# Patient Record
Sex: FEMALE | Race: WHITE | NOT HISPANIC OR LATINO | Employment: FULL TIME | URBAN - METROPOLITAN AREA
[De-identification: names, ages, dates, MRNs, and addresses within clinical notes are randomized per-mention and may not be internally consistent; named-entity substitution may affect disease eponyms.]

---

## 2017-01-19 ENCOUNTER — LAB VISIT (OUTPATIENT)
Dept: LAB | Facility: HOSPITAL | Age: 67
End: 2017-01-19
Attending: FAMILY MEDICINE
Payer: MEDICARE

## 2017-01-19 ENCOUNTER — OFFICE VISIT (OUTPATIENT)
Dept: INTERNAL MEDICINE | Facility: CLINIC | Age: 67
End: 2017-01-19
Payer: MEDICARE

## 2017-01-19 VITALS
DIASTOLIC BLOOD PRESSURE: 60 MMHG | BODY MASS INDEX: 25.36 KG/M2 | HEART RATE: 76 BPM | SYSTOLIC BLOOD PRESSURE: 110 MMHG | HEIGHT: 62 IN | TEMPERATURE: 99 F | WEIGHT: 137.81 LBS

## 2017-01-19 DIAGNOSIS — Z00.00 ANNUAL PHYSICAL EXAM: Primary | ICD-10-CM

## 2017-01-19 DIAGNOSIS — E55.9 VITAMIN D DEFICIENCY: ICD-10-CM

## 2017-01-19 DIAGNOSIS — R73.09 ABNORMAL GLUCOSE: ICD-10-CM

## 2017-01-19 DIAGNOSIS — M81.0 OSTEOPOROSIS: ICD-10-CM

## 2017-01-19 DIAGNOSIS — D84.9 IMMUNOSUPPRESSED STATUS: ICD-10-CM

## 2017-01-19 DIAGNOSIS — M05.9 RHEUMATOID ARTHRITIS WITH POSITIVE RHEUMATOID FACTOR, INVOLVING UNSPECIFIED SITE: ICD-10-CM

## 2017-01-19 DIAGNOSIS — E78.5 HYPERLIPIDEMIA, UNSPECIFIED HYPERLIPIDEMIA TYPE: ICD-10-CM

## 2017-01-19 LAB
25(OH)D3+25(OH)D2 SERPL-MCNC: 38 NG/ML
ALBUMIN SERPL BCP-MCNC: 3.6 G/DL
ALP SERPL-CCNC: 127 U/L
ALT SERPL W/O P-5'-P-CCNC: 21 U/L
ANION GAP SERPL CALC-SCNC: 6 MMOL/L
AST SERPL-CCNC: 19 U/L
BASOPHILS # BLD AUTO: 0.02 K/UL
BASOPHILS NFR BLD: 0.3 %
BILIRUB SERPL-MCNC: 0.3 MG/DL
BUN SERPL-MCNC: 24 MG/DL
CALCIUM SERPL-MCNC: 9.4 MG/DL
CHLORIDE SERPL-SCNC: 107 MMOL/L
CHOLEST/HDLC SERPL: 4.2 {RATIO}
CO2 SERPL-SCNC: 26 MMOL/L
CREAT SERPL-MCNC: 0.8 MG/DL
DIFFERENTIAL METHOD: NORMAL
EOSINOPHIL # BLD AUTO: 0.1 K/UL
EOSINOPHIL NFR BLD: 1.7 %
ERYTHROCYTE [DISTWIDTH] IN BLOOD BY AUTOMATED COUNT: 14.3 %
EST. GFR  (AFRICAN AMERICAN): >60 ML/MIN/1.73 M^2
EST. GFR  (NON AFRICAN AMERICAN): >60 ML/MIN/1.73 M^2
GLUCOSE SERPL-MCNC: 98 MG/DL
HCT VFR BLD AUTO: 37.7 %
HDL/CHOLESTEROL RATIO: 23.7 %
HDLC SERPL-MCNC: 211 MG/DL
HDLC SERPL-MCNC: 50 MG/DL
HGB BLD-MCNC: 12.2 G/DL
LDLC SERPL CALC-MCNC: 142.8 MG/DL
LYMPHOCYTES # BLD AUTO: 2 K/UL
LYMPHOCYTES NFR BLD: 28.7 %
MCH RBC QN AUTO: 29.5 PG
MCHC RBC AUTO-ENTMCNC: 32.4 %
MCV RBC AUTO: 91 FL
MONOCYTES # BLD AUTO: 0.6 K/UL
MONOCYTES NFR BLD: 8.6 %
NEUTROPHILS # BLD AUTO: 4.2 K/UL
NEUTROPHILS NFR BLD: 60.6 %
NONHDLC SERPL-MCNC: 161 MG/DL
PLATELET # BLD AUTO: 300 K/UL
PMV BLD AUTO: 10.5 FL
POTASSIUM SERPL-SCNC: 4.5 MMOL/L
PROT SERPL-MCNC: 7.2 G/DL
RBC # BLD AUTO: 4.13 M/UL
SODIUM SERPL-SCNC: 139 MMOL/L
TRIGL SERPL-MCNC: 91 MG/DL
TSH SERPL DL<=0.005 MIU/L-ACNC: 0.91 UIU/ML
WBC # BLD AUTO: 6.89 K/UL

## 2017-01-19 PROCEDURE — 82306 VITAMIN D 25 HYDROXY: CPT

## 2017-01-19 PROCEDURE — 84443 ASSAY THYROID STIM HORMONE: CPT

## 2017-01-19 PROCEDURE — 80053 COMPREHEN METABOLIC PANEL: CPT

## 2017-01-19 PROCEDURE — 80061 LIPID PANEL: CPT

## 2017-01-19 PROCEDURE — 99999 PR PBB SHADOW E&M-EST. PATIENT-LVL III: CPT | Mod: PBBFAC,,, | Performed by: FAMILY MEDICINE

## 2017-01-19 PROCEDURE — 99499 UNLISTED E&M SERVICE: CPT | Mod: S$GLB,,, | Performed by: FAMILY MEDICINE

## 2017-01-19 PROCEDURE — 99397 PER PM REEVAL EST PAT 65+ YR: CPT | Mod: S$GLB,,, | Performed by: FAMILY MEDICINE

## 2017-01-19 PROCEDURE — 83036 HEMOGLOBIN GLYCOSYLATED A1C: CPT

## 2017-01-19 PROCEDURE — 85025 COMPLETE CBC W/AUTO DIFF WBC: CPT

## 2017-01-19 PROCEDURE — 36415 COLL VENOUS BLD VENIPUNCTURE: CPT | Mod: PO

## 2017-01-19 RX ORDER — TOBRAMYCIN 3 MG/ML
SOLUTION/ DROPS OPHTHALMIC
Refills: 0 | COMMUNITY
Start: 2016-12-16 | End: 2018-02-28

## 2017-01-19 NOTE — MR AVS SNAPSHOT
[facility]  [address] [place] [address]  Phone: [phone]  Fax: [phone]                  Emmy Padilla   2017 10:00 AM   Office Visit    Description:  Female : 1950   Provider:  Daniel Gutierrez MD   [facility]           Reason for Visit     Follow-up           Diagnoses this Visit        Comments    Annual physical exam    -  Primary     Rheumatoid arthritis with positive rheumatoid factor, involving unspecified site         Hyperlipidemia, unspecified hyperlipidemia type         Immunosuppressed status         Osteoporosis         Vitamin D deficiency         Abnormal glucose                To Do List           Future Appointments        Provider Department Dept Phone    2017 10:30 AM LABORATORY, [place] [facility] [phone]      Goals (5 Years of Data)     None      [facility]     [facility] Nurse Care Line -  Assistance  Registered nurses in the [facility] Center provide clinical advisement, health education, appointment booking, and other advisory services.  Call for this free service at [phone].             Medications           Message regarding Medications     Verify the changes and/or additions to your medication regime listed below are the same as discussed with your clinician today.  If any of these changes or additions are incorrect, please notify your healthcare provider.             Verify that the below list of medications is an accurate representation of the medications you are currently taking.  If none reported, the list may be blank. If incorrect, please contact your healthcare provider. Carry this list with you in case of emergency.           Current Medications     clonazePAM (KLONOPIN) 1 MG tablet TAKE HALF TO 1 TABLET DAILY    duloxetine (CYMBALTA) 60 MG capsule TAKE 1 CAPSULE (60 MG TOTAL) BY MOUTH ONCE DAILY.    folic acid  "(FOLVITE) 1 MG tablet Take 1,000 mcg by mouth once daily.    LUBRICANT EYE DROPS, GLYC-PG, 1-0.3 % Drop     MENTHOL/CAMPHOR/PHEN/SALICYLIC (LIP BALM MEDICATED TOP)     methotrexate 2.5 MG Tab Take 12.5 mg by mouth every 7 days.    METHYL SALICYLATE/MENTHOL (MUSCLE RUB TOP)     prednisoLONE acetate (PRED FORTE) 1 % DrpS Place 1 drop into the right eye 4 (four) times daily.    predniSONE (DELTASONE) 10 MG tablet Take 10 mg by mouth.    tobramycin sulfate 0.3% (TOBREX) 0.3 % ophthalmic solution INSTIL 1 DROP IN RIGHT EYE 4 TIMES A DAY    tramadol (ULTRAM) 50 mg tablet Take 50 mg by mouth every 6 (six) hours as needed for Pain.           Clinical Reference Information           Vital Signs - Last Recorded  Most recent update: 1/19/2017  9:41 AM by Nisha Nguyen LPN    BP Pulse Temp Ht Wt BMI    110/60 76 98.5 °F (36.9 °C) (Tympanic) 5' 1.75" (1.568 m) 62.5 kg (137 lb 12.6 oz) 25.41 kg/m2      Blood Pressure          Most Recent Value    BP  110/60      Allergies as of 1/19/2017     Prednisone    Sulfa (Sulfonamide Antibiotics)      Immunizations Administered on Date of Encounter - 1/19/2017     None      Orders Placed During Today's Visit     Future Labs/Procedures Expected by Expires    CBC auto differential  1/19/2017 4/19/2017    Comprehensive metabolic panel  1/19/2017 3/20/2017    Hemoglobin A1c  1/19/2017 4/19/2017    Lipid panel  1/19/2017 4/19/2017    TSH  1/19/2017 4/19/2017    Vitamin D  1/19/2017 4/19/2017      Smoking Cessation     If you would like to quit smoking:   You may be eligible for free services if you are a [place] resident and started smoking cigarettes before September 1, 1988.  Call the Smoking Cessation Trust ([employer]) toll free at ([phone] [phone] or (915) 215-0044.   Call 9-106-QUIT-NOW if you do not meet the above criteria.            "

## 2017-01-20 LAB
ESTIMATED AVG GLUCOSE: 117 MG/DL
HBA1C MFR BLD HPLC: 5.7 %

## 2017-01-20 NOTE — PROGRESS NOTES
"Subjective:      Patient ID: Emmy Padilla is a 66 y.o. female.    Chief Complaint:   Annual review    HPI  67 yo female here for annual review.  Had 2-3 surgeries on R retina for detachment.  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  Vision improving, but not completely back to normal.  Hx of RA, followed by [facility]  Mood ok, on Cymbalta.  Klonopin PRN.      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    PMR (polymyalgia rheumatica)      resolved    Rheumatoid arthritis 12/2014     Dr. Traore     Family History   Problem Relation Age of Onset    Cancer Father      Esophageal    Cancer Mother     Cancer Brother 60     colon CA    Hypertension Brother     Hyperlipidemia Brother     Cancer Maternal Grandmother 92     breast CA     Past Surgical History   Procedure Laterality Date    Condyloma excision/fulguration  2003     Anal    Hand surgery Right      middle finger trigger and swaying     Retinal detachment surgery Right 8/2106    Retinal detachment surgery       Social History   Substance Use Topics    Smoking status: Current Every Day Smoker     Packs/day: 0.50     Types: Cigarettes    Smokeless tobacco: Never Used    Alcohol use No       Visit Vitals    /60    Pulse 76    Temp 98.5 °F (36.9 °C) (Tympanic)    Ht 5' 1.75" (1.568 m)    Wt 62.5 kg (137 lb 12.6 oz)    BMI 25.41 kg/m2       Review of Systems   Constitutional: Positive for fatigue. Negative for activity change, appetite change, chills, diaphoresis, fever and unexpected weight change.   HENT: Negative for ear pain, hearing loss, postnasal drip, rhinorrhea and tinnitus.    Eyes: Positive for visual disturbance.   Respiratory: Negative for cough, shortness of breath and wheezing.    Cardiovascular: Negative for " chest pain, palpitations and leg swelling.   Gastrointestinal: Negative for abdominal distention, abdominal pain, constipation and diarrhea.   Genitourinary: Negative for dysuria, frequency, hematuria and urgency.   Musculoskeletal: Positive for arthralgias and joint swelling.   Skin: Negative.    Neurological: Negative for weakness and headaches.     Objective:     Physical Exam   Constitutional: She is oriented to person, place, and time. She appears well-developed and well-nourished. No distress.   HENT:   Right Ear: External ear normal.   Left Ear: External ear normal.   Nose: Nose normal.   Mouth/Throat: Oropharynx is clear and moist.   Eyes: Pupils are equal, round, and reactive to light.   Neck: Normal range of motion. Neck supple. No thyromegaly present.   Cardiovascular: Normal rate, regular rhythm and normal heart sounds.    No murmur heard.  Pulmonary/Chest: Effort normal and breath sounds normal. No respiratory distress. She has no wheezes. She has no rales.   Abdominal: Soft. Bowel sounds are normal. She exhibits no distension. There is no tenderness.   Musculoskeletal: She exhibits no edema.   Neurological: She is alert and oriented to person, place, and time. No cranial nerve deficit.   Skin: Skin is warm and dry. No rash noted.   Psychiatric: She has a normal mood and affect. Her behavior is normal. Judgment and thought content normal.   Nursing note and vitals reviewed.      Lab Results   Component Value Date    WBC 6.89 01/19/2017    HGB 12.2 01/19/2017    HCT 37.7 01/19/2017     01/19/2017    CHOL 211 (H) 01/19/2017    TRIG 91 01/19/2017    HDL 50 01/19/2017    ALT 21 01/19/2017    AST 19 01/19/2017     01/19/2017    K 4.5 01/19/2017     01/19/2017    CREATININE 0.8 01/19/2017    BUN 24 (H) 01/19/2017    CO2 26 01/19/2017    TSH 0.906 01/19/2017    HGBA1C 5.7 01/19/2017       Assessment:     1. Annual physical exam    2. Rheumatoid arthritis with positive rheumatoid factor,  involving unspecified site    3. Hyperlipidemia, unspecified hyperlipidemia type    4. Immunosuppressed status    5. Osteoporosis    6. Vitamin D deficiency    7. Abnormal glucose       Plan:   Annual physical exam    Rheumatoid arthritis with positive rheumatoid factor, involving unspecified site    Hyperlipidemia, unspecified hyperlipidemia type  -     CBC auto differential; Future; Expected date: 1/19/17  -     Comprehensive metabolic panel; Future; Expected date: 1/19/17  -     Lipid panel; Future; Expected date: 1/19/17  -     TSH; Future; Expected date: 1/19/17    Immunosuppressed status    Osteoporosis    Vitamin D deficiency  -     Vitamin D; Future; Expected date: 1/19/17    Abnormal glucose  -     Hemoglobin A1c; Future; Expected date: 1/19/17    Update annual screening labs, all of which look good.  Drink plenty of water  Cont current meds  Stay active, healthy diet  F/U 6 mos for med check

## 2017-02-10 RX ORDER — BUPROPION HYDROCHLORIDE 150 MG/1
150 TABLET ORAL DAILY
Qty: 30 TABLET | Refills: 3 | Status: SHIPPED | OUTPATIENT
Start: 2017-02-10 | End: 2017-08-29

## 2017-02-10 NOTE — TELEPHONE ENCOUNTER
----- Message from Kelsi Spaulding sent at 2/10/2017  7:17 AM CST -----  Pt is checking the status of her rx refill sent over by her pharmacy/ please call [phone]

## 2017-03-28 DIAGNOSIS — F41.1 ANXIETY STATE: ICD-10-CM

## 2017-03-28 RX ORDER — CLONAZEPAM 1 MG/1
TABLET ORAL
Qty: 90 TABLET | Refills: 1 | Status: SHIPPED | OUTPATIENT
Start: 2017-03-28 | End: 2017-09-22 | Stop reason: SDUPTHER

## 2017-04-27 ENCOUNTER — TELEPHONE (OUTPATIENT)
Dept: INTERNAL MEDICINE | Facility: CLINIC | Age: 67
End: 2017-04-27

## 2017-04-27 ENCOUNTER — OFFICE VISIT (OUTPATIENT)
Dept: INTERNAL MEDICINE | Facility: CLINIC | Age: 67
End: 2017-04-27
Payer: MEDICARE

## 2017-04-27 VITALS
SYSTOLIC BLOOD PRESSURE: 120 MMHG | HEART RATE: 107 BPM | WEIGHT: 136.88 LBS | DIASTOLIC BLOOD PRESSURE: 80 MMHG | TEMPERATURE: 99 F | HEIGHT: 62 IN | BODY MASS INDEX: 25.19 KG/M2

## 2017-04-27 DIAGNOSIS — J06.9 UPPER RESPIRATORY TRACT INFECTION, UNSPECIFIED TYPE: Primary | ICD-10-CM

## 2017-04-27 PROCEDURE — 1159F MED LIST DOCD IN RCRD: CPT | Mod: S$GLB,,, | Performed by: PHYSICIAN ASSISTANT

## 2017-04-27 PROCEDURE — 99213 OFFICE O/P EST LOW 20 MIN: CPT | Mod: S$GLB,,, | Performed by: PHYSICIAN ASSISTANT

## 2017-04-27 PROCEDURE — 1125F AMNT PAIN NOTED PAIN PRSNT: CPT | Mod: S$GLB,,, | Performed by: PHYSICIAN ASSISTANT

## 2017-04-27 PROCEDURE — 99999 PR PBB SHADOW E&M-EST. PATIENT-LVL IV: CPT | Mod: PBBFAC,,, | Performed by: PHYSICIAN ASSISTANT

## 2017-04-27 PROCEDURE — 1160F RVW MEDS BY RX/DR IN RCRD: CPT | Mod: S$GLB,,, | Performed by: PHYSICIAN ASSISTANT

## 2017-04-27 RX ORDER — PROMETHAZINE HYDROCHLORIDE AND CODEINE PHOSPHATE 6.25; 1 MG/5ML; MG/5ML
5 SOLUTION ORAL EVERY 4 HOURS PRN
Qty: 118 ML | Refills: 0 | Status: SHIPPED | OUTPATIENT
Start: 2017-04-27 | End: 2017-05-07

## 2017-04-27 NOTE — TELEPHONE ENCOUNTER
Informed pt that  doesn't have any appt available. She says, she scheduled an appt with the PA this afternoon.

## 2017-04-27 NOTE — TELEPHONE ENCOUNTER
----- Message from Ariana Larson sent at 4/27/2017  7:37 AM CDT -----  Contact: pt   States she is calling rg having head congestion and cold and wants to be worked in today and can be reached at [phone]//[email or web address]

## 2017-04-27 NOTE — PROGRESS NOTES
Subjective:       Patient ID: Emmy Padilla is a 66 y.o. female.    Chief Complaint: URI    URI    This is a new problem. The current episode started yesterday. There has been no fever. Associated symptoms include congestion, coughing, rhinorrhea, sinus pain and a sore throat. Pertinent negatives include no abdominal pain, chest pain, diarrhea, dysuria, ear pain, headaches, joint pain, joint swelling, nausea, neck pain, plugged ear sensation, rash, sneezing, swollen glands, vomiting or wheezing. She has tried nothing for the symptoms.       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    PMR (polymyalgia rheumatica)     resolved    Rheumatoid arthritis 12/2014    Dr. Traore        Current Outpatient Prescriptions   Medication Sig Dispense Refill    clonazePAM (KLONOPIN) 1 MG tablet TAKE HALF TO 1 TABLET DAILY 90 tablet 1    duloxetine (CYMBALTA) 60 MG capsule TAKE 1 CAPSULE (60 MG TOTAL) BY MOUTH ONCE DAILY. 90 capsule 0    folic acid (FOLVITE) 1 MG tablet Take 1,000 mcg by mouth once daily.  8    LUBRICANT EYE DROPS, GLYC-PG, 1-0.3 % Drop       MENTHOL/CAMPHOR/PHEN/SALICYLIC (LIP BALM MEDICATED TOP)       methotrexate 2.5 MG Tab Take 12.5 mg by mouth every 7 days.      METHYL SALICYLATE/MENTHOL (MUSCLE RUB TOP)       prednisoLONE acetate (PRED FORTE) 1 % DrpS Place 1 drop into the right eye 4 (four) times daily.  5    tobramycin sulfate 0.3% (TOBREX) 0.3 % ophthalmic solution INSTIL 1 DROP IN RIGHT EYE 4 TIMES A DAY  0    tramadol (ULTRAM) 50 mg tablet Take 50 mg by mouth every 6 (six) hours as needed for Pain.      buPROPion (WELLBUTRIN XL) 150 MG TB24 tablet Take 1 tablet (150 mg total) by mouth once daily. 30 tablet 3    predniSONE (DELTASONE) 10 MG tablet Take 10 mg by mouth.       "promethazine-codeine 6.25-10 mg/5 ml (PHENERGAN WITH CODEINE) 6.25-10 mg/5 mL syrup Take 5 mLs by mouth every 4 (four) hours as needed for Cough. 118 mL 0     No current facility-administered medications for this visit.        Review of Systems   HENT: Positive for congestion, rhinorrhea and sore throat. Negative for ear pain and sneezing.    Respiratory: Positive for cough. Negative for wheezing.    Cardiovascular: Negative for chest pain.   Gastrointestinal: Negative for abdominal pain, diarrhea, nausea and vomiting.   Genitourinary: Negative for dysuria.   Musculoskeletal: Negative for joint pain and neck pain.   Skin: Negative for rash.   Neurological: Negative for headaches.       Objective:   /80  Pulse 107  Temp 98.8 °F (37.1 °C) (Oral)   Ht 5' 2" (1.575 m)  Wt 62.1 kg (136 lb 14.5 oz)  BMI 25.04 kg/m2     Physical Exam   Constitutional: She is oriented to person, place, and time. She appears well-developed and well-nourished. No distress.   HENT:   Head: Normocephalic and atraumatic.   Right Ear: Hearing, tympanic membrane, external ear and ear canal normal.   Left Ear: Hearing, tympanic membrane, external ear and ear canal normal.   Nose: Mucosal edema and rhinorrhea present. No sinus tenderness. Right sinus exhibits no maxillary sinus tenderness and no frontal sinus tenderness. Left sinus exhibits no maxillary sinus tenderness and no frontal sinus tenderness.   Mouth/Throat: Uvula is midline, oropharynx is clear and moist and mucous membranes are normal. No oropharyngeal exudate (post nasal drip), posterior oropharyngeal edema, posterior oropharyngeal erythema or tonsillar abscesses.         Eyes: Conjunctivae are normal. Pupils are equal, round, and reactive to light.   Neck: Normal range of motion. Neck supple.   Cardiovascular: Normal rate, regular rhythm and normal heart sounds.    Pulmonary/Chest: Effort normal and breath sounds normal. No respiratory distress.   Neurological: She is alert " and oriented to person, place, and time.   Skin: Skin is warm.   Psychiatric: She has a normal mood and affect. Her behavior is normal. Judgment and thought content normal.   Vitals reviewed.        Lab Results   Component Value Date    WBC 6.89 01/19/2017    HGB 12.2 01/19/2017    HCT 37.7 01/19/2017     01/19/2017    CHOL 211 (H) 01/19/2017    TRIG 91 01/19/2017    HDL 50 01/19/2017    ALT 21 01/19/2017    AST 19 01/19/2017     01/19/2017    K 4.5 01/19/2017     01/19/2017    CREATININE 0.8 01/19/2017    BUN 24 (H) 01/19/2017    CO2 26 01/19/2017    TSH 0.906 01/19/2017    HGBA1C 5.7 01/19/2017       Assessment:       1. Upper respiratory tract infection, unspecified type        Plan:   Upper respiratory tract infection, unspecified type  With cough. Avoid abx, no signs of bacterial infection at this time.  Signs and symptoms present <3 days.    Tobacco abuse- likely cause of cough. Watch for worsening of symptoms, fever, shortness of breath or weakness. rtc or go to ER for severe symptoms.   -     promethazine-codeine 6.25-10 mg/5 ml (PHENERGAN WITH CODEINE) 6.25-10 mg/5 mL syrup; Take 5 mLs by mouth every 4 (four) hours as needed for Cough.  Dispense: 118 mL; Refill: 0

## 2017-04-27 NOTE — MR AVS SNAPSHOT
[facility]  [address] [place] [address]  Phone: [phone]  Fax: [phone]                  Emmy Padilla   2017 1:20 PM   Office Visit    Description:  Female : 1950   Provider:  TOMMY Hanna   [facility]           Reason for Visit     URI           Diagnoses this Visit        Comments    Upper respiratory tract infection, unspecified type    -  Primary            To Do List           Goals (5 Years of Data)     None       These Medications        Disp Refills Start End    promethazine-codeine 6.25-10 mg/5 ml (PHENERGAN WITH CODEINE) 6.25-10 mg/5 mL syrup 118 mL 0 2017    Take 5 mLs by mouth every 4 (four) hours as needed for Cough. - Oral    Pharmacy: [facility]/pharmacy #[id number] - [place] - [address] [address] Ph #: [phone]         [facility]     [facility] On Call Nurse Care Line -  Assistance  Unless otherwise directed by your provider, please contact [facility], our nurse care line that is available for  assistance.     Registered nurses in the [facility] Center provide: appointment scheduling, clinical advisement, health education, and other advisory services.  Call: [phone] (toll free)               Medications           Message regarding Medications     Verify the changes and/or additions to your medication regime listed below are the same as discussed with your clinician today.  If any of these changes or additions are incorrect, please notify your healthcare provider.        START taking these NEW medications        Refills    promethazine-codeine 6.25-10 mg/5 ml (PHENERGAN WITH CODEINE) 6.25-10 mg/5 mL syrup 0    Sig: Take 5 mLs by mouth every 4 (four) hours as needed for Cough.    Class: Print    Route: Oral           Verify that the below list of medications is an accurate representation of the medications you are currently taking.  If none reported,  "the list may be blank. If incorrect, please contact your healthcare provider. Carry this list with you in case of emergency.           Current Medications     clonazePAM (KLONOPIN) 1 MG tablet TAKE HALF TO 1 TABLET DAILY    duloxetine (CYMBALTA) 60 MG capsule TAKE 1 CAPSULE (60 MG TOTAL) BY MOUTH ONCE DAILY.    folic acid (FOLVITE) 1 MG tablet Take 1,000 mcg by mouth once daily.    LUBRICANT EYE DROPS, GLYC-PG, 1-0.3 % Drop     MENTHOL/CAMPHOR/PHEN/SALICYLIC (LIP BALM MEDICATED TOP)     methotrexate 2.5 MG Tab Take 12.5 mg by mouth every 7 days.    METHYL SALICYLATE/MENTHOL (MUSCLE RUB TOP)     prednisoLONE acetate (PRED FORTE) 1 % DrpS Place 1 drop into the right eye 4 (four) times daily.    tobramycin sulfate 0.3% (TOBREX) 0.3 % ophthalmic solution INSTIL 1 DROP IN RIGHT EYE 4 TIMES A DAY    tramadol (ULTRAM) 50 mg tablet Take 50 mg by mouth every 6 (six) hours as needed for Pain.    buPROPion (WELLBUTRIN XL) 150 MG TB24 tablet Take 1 tablet (150 mg total) by mouth once daily.    predniSONE (DELTASONE) 10 MG tablet Take 10 mg by mouth.    promethazine-codeine 6.25-10 mg/5 ml (PHENERGAN WITH CODEINE) 6.25-10 mg/5 mL syrup Take 5 mLs by mouth every 4 (four) hours as needed for Cough.           Clinical Reference Information           Your Vitals Were     BP Pulse Temp Height Weight BMI    120/80 107 99.4 °F (37.4 °C) (Tympanic) 5' 2" (1.575 m) 62.1 kg (136 lb 14.5 oz) 25.04 kg/m2      Blood Pressure          Most Recent Value    BP  120/80      Allergies as of 4/27/2017     Prednisone    Sulfa (Sulfonamide Antibiotics)      Immunizations Administered on Date of Encounter - 4/27/2017     None      Instructions            OTC decongestant -         Smoking Cessation     If you would like to quit smoking:   You may be eligible for free services if you are a [place] resident and started smoking cigarettes before September 1, 1988.  Call the Smoking Cessation Trust ([facility]) toll free at [phone] or (912) " 470-1890.   Call 1-800-QUIT-NOW if you do not meet the above criteria.   Contact us via email: [email or web address]   View our website for more information: [email or web address]        Language Assistance Services     ATTENTION: Language assistance services are available, free of charge. Please call [phone].      ATENCIÓN: Si habla español, tiene a frazier disposición servicios gratuitos de asistencia lingüística. Llame al [phone].     CHÚ Ý: N?u b?n nói Ti?ng Vi?t, có các d?ch v? h? tr? ngôn ng? mi?n phí dành cho b?n. G?i s? [phone].         [facility] complies with applicable Federal civil rights laws and does not discriminate on the basis of race, color, national origin, age, disability, or sex.

## 2017-05-15 ENCOUNTER — TELEPHONE (OUTPATIENT)
Dept: INTERNAL MEDICINE | Facility: CLINIC | Age: 67
End: 2017-05-15

## 2017-05-15 ENCOUNTER — OFFICE VISIT (OUTPATIENT)
Dept: URGENT CARE | Facility: CLINIC | Age: 67
End: 2017-05-15
Payer: MEDICARE

## 2017-05-15 VITALS
WEIGHT: 137.81 LBS | HEART RATE: 94 BPM | RESPIRATION RATE: 14 BRPM | TEMPERATURE: 99 F | BODY MASS INDEX: 25.36 KG/M2 | OXYGEN SATURATION: 98 % | HEIGHT: 62 IN | SYSTOLIC BLOOD PRESSURE: 122 MMHG | DIASTOLIC BLOOD PRESSURE: 64 MMHG

## 2017-05-15 DIAGNOSIS — S51.812A SKIN TEAR OF FOREARM WITHOUT COMPLICATION, LEFT, INITIAL ENCOUNTER: Primary | ICD-10-CM

## 2017-05-15 PROCEDURE — 1157F ADVNC CARE PLAN IN RCRD: CPT | Mod: S$GLB,,, | Performed by: NURSE PRACTITIONER

## 2017-05-15 PROCEDURE — 1126F AMNT PAIN NOTED NONE PRSNT: CPT | Mod: S$GLB,,, | Performed by: NURSE PRACTITIONER

## 2017-05-15 PROCEDURE — 99999 PR PBB SHADOW E&M-EST. PATIENT-LVL IV: CPT | Mod: PBBFAC,,, | Performed by: NURSE PRACTITIONER

## 2017-05-15 PROCEDURE — 1159F MED LIST DOCD IN RCRD: CPT | Mod: S$GLB,,, | Performed by: NURSE PRACTITIONER

## 2017-05-15 PROCEDURE — 1160F RVW MEDS BY RX/DR IN RCRD: CPT | Mod: S$GLB,,, | Performed by: NURSE PRACTITIONER

## 2017-05-15 PROCEDURE — 99213 OFFICE O/P EST LOW 20 MIN: CPT | Mod: S$GLB,,, | Performed by: NURSE PRACTITIONER

## 2017-05-15 RX ORDER — MUPIROCIN 20 MG/G
OINTMENT TOPICAL 3 TIMES DAILY
Qty: 30 G | Refills: 0 | Status: SHIPPED | OUTPATIENT
Start: 2017-05-15 | End: 2017-05-25

## 2017-05-15 NOTE — PATIENT INSTRUCTIONS
Abrasions  Abrasions are skin scrapes. Their treatment depends on how large and deep the abrasion is.  Home care  You may be prescribed an antibiotic cream or ointment to apply to the wound. This helps prevent infection. Follow instructions when using this medication.  General care  · To care for the abrasion, do the following each day for as long as directed by your health care provider.  ¨ If you were given a bandage, change it once a day. If your bandage sticks to the wound, soak it in warm water until it loosens.  ¨ Wash the area with soap and warm water. You may do this in a sink or under a tub faucet or shower. Rinse off the soap. Then pat the area dry with a clean towel.  ¨ If antibiotic ointment or cream was prescribed, reapply it to the wound as directed. Cover the wound with a fresh non-stick bandage. If the bandage becomes wet or dirty, change it as soon as possible.  · You may use acetaminophen or ibuprofen to control pain unless another pain medication was prescribed. Note: If you have chronic liver or kidney disease or ever had a stomach ulcer or GI bleeding, talk with your health care provider before using these medications. Do not use ibuprofen in children under six months of age.  · Most skin wounds heal within ten days. But an infection may occur despite treatment. Therefore, monitor the wound for signs of infection as listed below.  Follow-up care  Follow up with your health care provider, or as advised.  When to seek medical advice  Call your health care provider right away if any of these occur:  · Fever of 101ºF (38.3ºC) or higher, or as directed by your health care provider  · Increasing pain, redness, swelling, or drainage from the wound  · Bleeding from the wound that does not stop after a few minutes of steady, firm pressure  · Decreased ability to move any body part near wound  Date Last Reviewed: 3/22/2015  © 7195-4550 [employer], [employer]. [address] [place] [address], [place]  [address]. All rights reserved. This information is not intended as a substitute for professional medical care. Always follow your healthcare professional's instructions.

## 2017-05-15 NOTE — TELEPHONE ENCOUNTER
----- Message from Scooter Lara sent at 5/15/2017 11:40 AM CDT -----  Contact: Pt   Pt is requesting to speak with the nurse concerning a bruise on her foot. Pt is looking to be seen today on [place]. Pt can be reached at [phone]

## 2017-05-15 NOTE — TELEPHONE ENCOUNTER
Spoke with pt, she says, she just checked in for urgent care. She doesn't have a bruise on her foot. She has an open wound on her arm. Pt will be seen in urgent care.

## 2017-05-15 NOTE — MR AVS SNAPSHOT
[facility]  [address] [facility] [place] [address]  Phone: [phone]  Fax: [phone]                  Emmy Padilla   5/15/2017 1:10 PM   Office Visit    Description:  Female : 1950   Provider:  MELISSA Gar   Department:  [facility]           Reason for Visit     Puncture Wound           Diagnoses this Visit        Comments    Skin tear of forearm without complication, left, initial encounter    -  Primary            To Do List           Goals (5 Years of Data)     None       These Medications        Disp Refills Start End    mupirocin (BACTROBAN) 2 % ointment 30 g 0 5/15/2017 2017    Apply topically 3 (three) times daily. - Topical (Top)    Pharmacy: [facility]/pharmacy #[id number] - [place] [address] Ph #: [phone]         [facility]     [facility] On Call Nurse Care Line -  Assistance  Unless otherwise directed by your provider, please contact [facility], our nurse care line that is available for  assistance.     Registered nurses in the [facility] provide: appointment scheduling, clinical advisement, health education, and other advisory services.  Call: [phone] (toll free)               Medications           Message regarding Medications     Verify the changes and/or additions to your medication regime listed below are the same as discussed with your clinician today.  If any of these changes or additions are incorrect, please notify your healthcare provider.        START taking these NEW medications        Refills    mupirocin (BACTROBAN) 2 % ointment 0    Sig: Apply topically 3 (three) times daily.    Class: Normal    Route: Topical (Top)      STOP taking these medications     predniSONE (DELTASONE) 10 MG tablet Take 10 mg by mouth.           Verify that the below list of medications is an accurate representation of the medications you are currently taking.  If none reported, the  "list may be blank. If incorrect, please contact your healthcare provider. Carry this list with you in case of emergency.           Current Medications     clonazePAM (KLONOPIN) 1 MG tablet TAKE HALF TO 1 TABLET DAILY    duloxetine (CYMBALTA) 60 MG capsule TAKE 1 CAPSULE (60 MG TOTAL) BY MOUTH ONCE DAILY.    folic acid (FOLVITE) 1 MG tablet Take 1,000 mcg by mouth once daily.    LUBRICANT EYE DROPS, GLYC-PG, 1-0.3 % Drop     MENTHOL/CAMPHOR/PHEN/SALICYLIC (LIP BALM MEDICATED TOP)     methotrexate 2.5 MG Tab Take 12.5 mg by mouth every 7 days.    METHYL SALICYLATE/MENTHOL (MUSCLE RUB TOP)     prednisoLONE acetate (PRED FORTE) 1 % DrpS Place 1 drop into the right eye 4 (four) times daily.    tramadol (ULTRAM) 50 mg tablet Take 50 mg by mouth every 6 (six) hours as needed for Pain.    buPROPion (WELLBUTRIN XL) 150 MG TB24 tablet Take 1 tablet (150 mg total) by mouth once daily.    mupirocin (BACTROBAN) 2 % ointment Apply topically 3 (three) times daily.    tobramycin sulfate 0.3% (TOBREX) 0.3 % ophthalmic solution INSTIL 1 DROP IN RIGHT EYE 4 TIMES A DAY           Clinical Reference Information           Your Vitals Were     BP Pulse Temp Resp Height Weight    122/64 (BP Location: Left arm, Patient Position: Sitting) 94 98.5 °F (36.9 °C) (Tympanic) 14 5' 2" (1.575 m) 62.5 kg (137 lb 12.6 oz)    SpO2 BMI             98% 25.2 kg/m2         Blood Pressure          Most Recent Value    BP  122/64      Allergies as of 5/15/2017     Prednisone    Sulfa (Sulfonamide Antibiotics)      Immunizations Administered on Date of Encounter - 5/15/2017     None      Orders Placed During Today's Visit      Normal Orders This Visit    POCT Skin care       Instructions      Abrasions  Abrasions are skin scrapes. Their treatment depends on how large and deep the abrasion is.  Home care  You may be prescribed an antibiotic cream or ointment to apply to the wound. This helps prevent infection. Follow instructions when using this " medication.  General care  · To care for the abrasion, do the following each day for as long as directed by your health care provider.  ¨ If you were given a bandage, change it once a day. If your bandage sticks to the wound, soak it in warm water until it loosens.  ¨ Wash the area with soap and warm water. You may do this in a sink or under a tub faucet or shower. Rinse off the soap. Then pat the area dry with a clean towel.  ¨ If antibiotic ointment or cream was prescribed, reapply it to the wound as directed. Cover the wound with a fresh non-stick bandage. If the bandage becomes wet or dirty, change it as soon as possible.  · You may use acetaminophen or ibuprofen to control pain unless another pain medication was prescribed. Note: If you have chronic liver or kidney disease or ever had a stomach ulcer or GI bleeding, talk with your health care provider before using these medications. Do not use ibuprofen in children under six months of age.  · Most skin wounds heal within ten days. But an infection may occur despite treatment. Therefore, monitor the wound for signs of infection as listed below.  Follow-up care  Follow up with your health care provider, or as advised.  When to seek medical advice  Call your health care provider right away if any of these occur:  · Fever of 101ºF (38.3ºC) or higher, or as directed by your health care provider  · Increasing pain, redness, swelling, or drainage from the wound  · Bleeding from the wound that does not stop after a few minutes of steady, firm pressure  · Decreased ability to move any body part near wound  Date Last Reviewed: 3/22/2015  © 1670-4141 [employer]. [address] [place] [address]. All rights reserved. This information is not intended as a substitute for professional medical care. Always follow your healthcare professional's instructions.             Smoking Cessation     If you would like to quit smoking:   You may be eligible for  free services if you are a [place] resident and started smoking cigarettes before September 1, 1988.  Call the Smoking Cessation Trust ([facility]) toll free at (638) [phone] or (094) 456-9772.   Call 1-800-QUIT-NOW if you do not meet the above criteria.   Contact us via email: [email or web address]   View our website for more information: [email or web address]        Language Assistance Services     ATTENTION: Language assistance services are available, free of charge. Please call [phone].      ATENCIÓN: Si habla español, tiene a frazier disposición servicios gratuitos de asistencia lingüística. Llame al [phone].     CHÚ Ý: N?u b?n nói Ti?ng Vi?t, có các d?ch v? h? tr? ngôn ng? mi?n phí dành cho b?n. G?i s? [phone].         The [facility] complies with applicable Federal civil rights laws and does not discriminate on the basis of race, color, national origin, age, disability, or sex.

## 2017-05-15 NOTE — PROGRESS NOTES
"Subjective:       Patient ID: Emmy Padilla is a 66 y.o. female.    Chief Complaint: Puncture Wound (skin tear  to left forearm)    HPI Comments: Patient was at [employer] yesterday when a buckle from a child's sandal scraped her dorsal left forearm. She cleaned the wound at home and applied antibiotic powder. She presents to Urgent Care for evaluation. She does not take blood thinners. Tdap last 3/2013      /64 (BP Location: Left arm, Patient Position: Sitting)  Pulse 94  Temp 98.5 °F (36.9 °C) (Tympanic)   Resp 14  Ht 5' 2" (1.575 m)  Wt 62.5 kg (137 lb 12.6 oz)  SpO2 98%  BMI 25.2 kg/m2    Review of Systems   Constitutional: Positive for activity change. Negative for appetite change, chills, diaphoresis, fatigue, fever and unexpected weight change.   HENT: Negative.    Respiratory: Negative for cough and shortness of breath.    Cardiovascular: Negative for chest pain, palpitations and leg swelling.   Gastrointestinal: Negative.    Genitourinary: Negative.    Musculoskeletal: Negative.    Skin: Positive for color change and wound. Negative for pallor and rash.   Allergic/Immunologic: Positive for immunocompromised state (methotrexate weekly).   Neurological: Negative.  Negative for dizziness and facial asymmetry.   Hematological: Negative for adenopathy. Does not bruise/bleed easily.   Psychiatric/Behavioral: Negative for agitation, behavioral problems and confusion.       Objective:      Physical Exam   Constitutional: She appears well-developed and well-nourished. She is cooperative. No distress.   HENT:   Head: Normocephalic and atraumatic.   Cardiovascular: Normal rate.    Pulmonary/Chest: Effort normal.   Musculoskeletal: Normal range of motion.   Neurological: She is alert.   Skin: Skin is warm and dry. Abrasion noted. No rash noted. She is not diaphoretic.   Small c shaped skin tear to dorsal left forearm. There is mild surrounding bruising. No cellulitis, no foreign objects noted. "   Psychiatric: She has a normal mood and affect. Her behavior is normal. Judgment and thought content normal.   Nursing note and vitals reviewed.      Assessment:       1. Skin tear of forearm without complication, left, initial encounter        Plan:       Emmy was seen today for puncture wound.    Diagnoses and all orders for this visit:    Skin tear of forearm without complication, left, initial encounter  -     mupirocin (BACTROBAN) 2 % ointment; Apply topically 3 (three) times daily.  -     POCT Skin care  tdap utd  Keep wound clean and dry with antibacterial soap  Pat dry  bactroban as prescribed  If symptoms worsen or fail to improve with treatment, see your Primary Care Provider or go to the nearest Emergency Room.

## 2017-06-04 RX ORDER — DULOXETIN HYDROCHLORIDE 60 MG/1
CAPSULE, DELAYED RELEASE ORAL
Qty: 90 CAPSULE | Refills: 2 | Status: SHIPPED | OUTPATIENT
Start: 2017-06-04 | End: 2018-04-30 | Stop reason: SDUPTHER

## 2017-08-08 ENCOUNTER — TELEPHONE (OUTPATIENT)
Dept: INTERNAL MEDICINE | Facility: CLINIC | Age: 67
End: 2017-08-08

## 2017-08-08 NOTE — TELEPHONE ENCOUNTER
Pt states, she got everything she needed. She called about preop appt and got the fax number so Dr. Cortez's office can fax over the forms they have. Pt had the [phone] fax number. Gave pt the [phone] fax and advised her that is a better fax number. She states, she will call 's office and give them that fax number.

## 2017-08-08 NOTE — TELEPHONE ENCOUNTER
----- Message from Hazel Manning sent at 8/8/2017  9:30 AM CDT -----  Pt is asking for  You to call her at [phone] concerning her preop.

## 2017-08-22 ENCOUNTER — PATIENT OUTREACH (OUTPATIENT)
Dept: ADMINISTRATIVE | Facility: HOSPITAL | Age: 67
End: 2017-08-22

## 2017-08-22 NOTE — PROGRESS NOTES
Patient had colonoscopy with Dr. Tra Cruz within the last five years. I have attached a note to her appointment. I am also sending a note to the care coordinator at the provider's office for a medical release to get a copy of report.

## 2017-08-22 NOTE — Clinical Note
Please have patient sign medical release on 08/29/17 visit. I need a copy of her colonoscopy. Please fax to me when sign. Thanks, Kathi

## 2017-08-29 ENCOUNTER — OFFICE VISIT (OUTPATIENT)
Dept: INTERNAL MEDICINE | Facility: CLINIC | Age: 67
End: 2017-08-29
Payer: MEDICARE

## 2017-08-29 ENCOUNTER — LAB VISIT (OUTPATIENT)
Dept: LAB | Facility: HOSPITAL | Age: 67
End: 2017-08-29
Attending: FAMILY MEDICINE
Payer: MEDICARE

## 2017-08-29 VITALS
HEART RATE: 72 BPM | BODY MASS INDEX: 25.52 KG/M2 | WEIGHT: 138.69 LBS | SYSTOLIC BLOOD PRESSURE: 112 MMHG | DIASTOLIC BLOOD PRESSURE: 70 MMHG | HEIGHT: 62 IN | TEMPERATURE: 97 F

## 2017-08-29 DIAGNOSIS — Z01.818 PREOP EXAMINATION: ICD-10-CM

## 2017-08-29 DIAGNOSIS — D84.9 IMMUNOSUPPRESSED STATUS: ICD-10-CM

## 2017-08-29 DIAGNOSIS — H33.21 DETACHED RETINA, RIGHT: Primary | ICD-10-CM

## 2017-08-29 DIAGNOSIS — M05.9 RHEUMATOID ARTHRITIS WITH POSITIVE RHEUMATOID FACTOR, INVOLVING UNSPECIFIED SITE: ICD-10-CM

## 2017-08-29 LAB
ANION GAP SERPL CALC-SCNC: 6 MMOL/L
BASOPHILS # BLD AUTO: 0.06 K/UL
BASOPHILS NFR BLD: 0.8 %
BUN SERPL-MCNC: 25 MG/DL
CALCIUM SERPL-MCNC: 8.7 MG/DL
CHLORIDE SERPL-SCNC: 106 MMOL/L
CO2 SERPL-SCNC: 27 MMOL/L
CREAT SERPL-MCNC: 0.6 MG/DL
DIFFERENTIAL METHOD: ABNORMAL
EOSINOPHIL # BLD AUTO: 0.2 K/UL
EOSINOPHIL NFR BLD: 2.3 %
ERYTHROCYTE [DISTWIDTH] IN BLOOD BY AUTOMATED COUNT: 14.4 %
EST. GFR  (AFRICAN AMERICAN): >60 ML/MIN/1.73 M^2
EST. GFR  (NON AFRICAN AMERICAN): >60 ML/MIN/1.73 M^2
GLUCOSE SERPL-MCNC: 85 MG/DL
HCT VFR BLD AUTO: 38.2 %
HGB BLD-MCNC: 12.2 G/DL
LYMPHOCYTES # BLD AUTO: 2.2 K/UL
LYMPHOCYTES NFR BLD: 30.4 %
MCH RBC QN AUTO: 30.1 PG
MCHC RBC AUTO-ENTMCNC: 31.9 G/DL
MCV RBC AUTO: 94 FL
MONOCYTES # BLD AUTO: 0.6 K/UL
MONOCYTES NFR BLD: 8.8 %
NEUTROPHILS # BLD AUTO: 4.1 K/UL
NEUTROPHILS NFR BLD: 57.6 %
PLATELET # BLD AUTO: 328 K/UL
PMV BLD AUTO: 10.9 FL
POTASSIUM SERPL-SCNC: 4.4 MMOL/L
RBC # BLD AUTO: 4.05 M/UL
SODIUM SERPL-SCNC: 139 MMOL/L
WBC # BLD AUTO: 7.08 K/UL

## 2017-08-29 PROCEDURE — 93010 ELECTROCARDIOGRAM REPORT: CPT | Mod: S$GLB,,, | Performed by: INTERNAL MEDICINE

## 2017-08-29 PROCEDURE — 1159F MED LIST DOCD IN RCRD: CPT | Mod: S$GLB,,, | Performed by: FAMILY MEDICINE

## 2017-08-29 PROCEDURE — 99999 PR PBB SHADOW E&M-EST. PATIENT-LVL III: CPT | Mod: PBBFAC,,, | Performed by: FAMILY MEDICINE

## 2017-08-29 PROCEDURE — 99214 OFFICE O/P EST MOD 30 MIN: CPT | Mod: S$GLB,,, | Performed by: FAMILY MEDICINE

## 2017-08-29 PROCEDURE — 3008F BODY MASS INDEX DOCD: CPT | Mod: S$GLB,,, | Performed by: FAMILY MEDICINE

## 2017-08-29 PROCEDURE — 80048 BASIC METABOLIC PNL TOTAL CA: CPT

## 2017-08-29 PROCEDURE — 36415 COLL VENOUS BLD VENIPUNCTURE: CPT | Mod: PO

## 2017-08-29 PROCEDURE — 99499 UNLISTED E&M SERVICE: CPT | Mod: S$GLB,,, | Performed by: FAMILY MEDICINE

## 2017-08-29 PROCEDURE — 1126F AMNT PAIN NOTED NONE PRSNT: CPT | Mod: S$GLB,,, | Performed by: FAMILY MEDICINE

## 2017-08-29 PROCEDURE — 85025 COMPLETE CBC W/AUTO DIFF WBC: CPT

## 2017-08-29 PROCEDURE — 93005 ELECTROCARDIOGRAM TRACING: CPT | Mod: S$GLB,,, | Performed by: FAMILY MEDICINE

## 2017-08-29 NOTE — PROGRESS NOTES
"Subjective:      Patient ID: Emmy Padilla is a 67 y.o. female.    Chief Complaint: Pre-op Exam    HPI  68 yo female with RA/osteoporosis on Prolia with her Gyn here for preop exam.  Scheduled for R Vitrectomy, retinal detachment repair, removal of silicon oil with Dr. GWEN Natarajan.  No prior anesthesia complications.  Has had a few prior surgeries on this retina.  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    PMR (polymyalgia rheumatica)     resolved    Rheumatoid arthritis 12/2014    Dr. Traore     Family History   Problem Relation Age of Onset    Cancer Father      Esophageal    Cancer Mother     Cancer Brother 60     colon CA    Hypertension Brother     Hyperlipidemia Brother     Cancer Maternal Grandmother 92     breast CA     Past Surgical History:   Procedure Laterality Date    CONDYLOMA EXCISION/FULGURATION  2003    Anal    HAND SURGERY Right     middle finger trigger and swaying     RETINAL DETACHMENT SURGERY Right 8/2106    RETINAL DETACHMENT SURGERY       Social History   Substance Use Topics    Smoking status: Current Every Day Smoker     Packs/day: 0.50     Types: Cigarettes    Smokeless tobacco: Never Used    Alcohol use No       /70   Pulse 72   Temp 97.3 °F (36.3 °C) (Tympanic)   Ht 5' 2" (1.575 m)   Wt 62.9 kg (138 lb 10.7 oz)   BMI 25.36 kg/m²     Review of Systems   Constitutional: Negative for activity change, appetite change, chills, diaphoresis, fatigue, fever and unexpected weight change.   HENT: Positive for postnasal drip. Negative for ear pain, hearing loss, rhinorrhea and tinnitus.    Eyes: Positive for visual disturbance.   Respiratory: Positive for cough. Negative for shortness of breath and wheezing.    Cardiovascular: Negative " for chest pain, palpitations and leg swelling.   Gastrointestinal: Negative for abdominal distention, abdominal pain, constipation and diarrhea.   Genitourinary: Negative for dysuria, frequency, hematuria and urgency.   Skin: Negative.    Neurological: Negative for weakness and headaches.   Psychiatric/Behavioral: Negative for confusion and decreased concentration. The patient is nervous/anxious.      Objective:     Physical Exam   Constitutional: She is oriented to person, place, and time. She appears well-developed and well-nourished. No distress.   HENT:   Right Ear: External ear normal.   Left Ear: External ear normal.   Nose: Nose normal.   Mouth/Throat: Oropharynx is clear and moist.   Eyes: Pupils are equal, round, and reactive to light.   Neck: Normal range of motion. Neck supple.   Cardiovascular: Normal rate, regular rhythm and normal heart sounds.    Pulmonary/Chest: Effort normal and breath sounds normal. No respiratory distress. She has no wheezes. She has no rales.   Abdominal: Soft. Bowel sounds are normal. She exhibits no distension. There is no tenderness.   Musculoskeletal: She exhibits no edema.   Neurological: She is alert and oriented to person, place, and time. No cranial nerve deficit.   Skin: Skin is warm and dry. No rash noted.   Psychiatric: She has a normal mood and affect. Her behavior is normal. Judgment and thought content normal.   Nursing note and vitals reviewed.      Lab Results   Component Value Date    WBC 6.89 01/19/2017    HGB 12.2 01/19/2017    HCT 37.7 01/19/2017     01/19/2017    CHOL 211 (H) 01/19/2017    TRIG 91 01/19/2017    HDL 50 01/19/2017    ALT 21 01/19/2017    AST 19 01/19/2017     01/19/2017    K 4.5 01/19/2017     01/19/2017    CREATININE 0.8 01/19/2017    BUN 24 (H) 01/19/2017    CO2 26 01/19/2017    TSH 0.906 01/19/2017    HGBA1C 5.7 01/19/2017       Assessment:     1. Detached retina, right    2. Preop examination    3. Immunosuppressed status     4. Rheumatoid arthritis with positive rheumatoid factor, involving unspecified site       Plan:   Detached retina, right    Preop examination  -     IN OFFICE EKG 12-LEAD (to Muse)  -     CBC auto differential; Future; Expected date: 08/29/2017  -     Basic metabolic panel; Future; Expected date: 08/29/2017    Immunosuppressed status    Rheumatoid arthritis with positive rheumatoid factor, involving unspecified site    EKG normal  Update CBC/BMP  Low cardiac/pulm risk for surgery.  Hold MTX week of surgery  Can use nasal spray, flonase/nasacort at bedtime and mucinex DM if needed.    Will fax note/labs/EKG to Dr. Abdullahi Natarajan   F/u 6mos

## 2017-09-22 DIAGNOSIS — F41.1 ANXIETY STATE: ICD-10-CM

## 2017-09-25 ENCOUNTER — TELEPHONE (OUTPATIENT)
Dept: INTERNAL MEDICINE | Facility: CLINIC | Age: 67
End: 2017-09-25

## 2017-09-25 RX ORDER — CLONAZEPAM 1 MG/1
TABLET ORAL
Qty: 30 TABLET | Refills: 3 | Status: SHIPPED | OUTPATIENT
Start: 2017-09-25 | End: 2017-12-26 | Stop reason: SDUPTHER

## 2017-09-25 NOTE — TELEPHONE ENCOUNTER
----- Message from Ariana Larson sent at 9/25/2017 12:51 PM CDT -----  Contact: pt  States she's calling rg usually gets a 90 day supply of clonadepam 1mg and only received a 30 day and wants to discuss and can be reached at [phone]/[email or web address]/dbw

## 2017-09-25 NOTE — TELEPHONE ENCOUNTER
----- Message from Kimber Garcia sent at 9/25/2017  2:33 PM CDT -----  Contact: [facility]  She needs to verify the quantity for Clonazepam.  Call her at [phone].                                                painting

## 2017-09-25 NOTE — TELEPHONE ENCOUNTER
Pt states a 30 day supply of clonazepam was sent in, but she usually get a 90 day supply because it's cheaper and she doesn't have to run to the pharmacy as often. She also wanted to let you know she's doing ok post op detached retina surgery. She has 3 weeks left recovery at home.

## 2017-09-25 NOTE — TELEPHONE ENCOUNTER
Spoke with Marcy, informed her that I sent a message to Dr. Gutierrez that pt is requesting a 90 day supply. Awaiting a response. She verbalized understanding.

## 2017-09-26 DIAGNOSIS — F41.1 ANXIETY STATE: ICD-10-CM

## 2017-09-26 RX ORDER — CLONAZEPAM 1 MG/1
TABLET ORAL
Qty: 90 TABLET | Refills: 1 | Status: SHIPPED | OUTPATIENT
Start: 2017-09-26 | End: 2018-03-19 | Stop reason: SDUPTHER

## 2017-11-29 ENCOUNTER — TELEPHONE (OUTPATIENT)
Dept: INTERNAL MEDICINE | Facility: CLINIC | Age: 67
End: 2017-11-29

## 2017-11-29 DIAGNOSIS — M06.9 RHEUMATOID ARTHRITIS, INVOLVING UNSPECIFIED SITE, UNSPECIFIED RHEUMATOID FACTOR PRESENCE: Primary | ICD-10-CM

## 2017-11-29 NOTE — TELEPHONE ENCOUNTER
Called pt and she said she would like to be referred to a Rheumatologist for a second opinion.  She has been seeing Dr. Rogers for RA but wants second opinion.  She said that you talked about it at her last visit.  She thinks you were talking about [facility].

## 2017-11-29 NOTE — TELEPHONE ENCOUNTER
----- Message from Rox Marrero sent at 11/29/2017 12:23 PM CST -----  Pt is requesting a call from nurse to discuss a referral to a Rheumatology        Please call pt back at [phone]

## 2017-11-30 NOTE — TELEPHONE ENCOUNTER
Notified pt that rheumatology order was entered. Next available appt is 4/2/18. Will put on wait list. If a sooner appt becomes available, she will be notified. She verbalized understanding

## 2017-12-26 ENCOUNTER — HOSPITAL ENCOUNTER (OUTPATIENT)
Dept: RADIOLOGY | Facility: HOSPITAL | Age: 67
Discharge: HOME | End: 2017-12-26
Attending: PHYSICIAN ASSISTANT
Payer: MEDICARE

## 2017-12-26 ENCOUNTER — OFFICE VISIT (OUTPATIENT)
Dept: INTERNAL MEDICINE | Facility: CLINIC | Age: 67
End: 2017-12-26
Payer: MEDICARE

## 2017-12-26 VITALS
WEIGHT: 137.38 LBS | OXYGEN SATURATION: 98 % | DIASTOLIC BLOOD PRESSURE: 80 MMHG | TEMPERATURE: 98 F | SYSTOLIC BLOOD PRESSURE: 122 MMHG | BODY MASS INDEX: 25.28 KG/M2 | HEIGHT: 62 IN | HEART RATE: 86 BPM

## 2017-12-26 DIAGNOSIS — Z72.0 TOBACCO ABUSE: ICD-10-CM

## 2017-12-26 DIAGNOSIS — R05.9 COUGH IN ADULT PATIENT: ICD-10-CM

## 2017-12-26 DIAGNOSIS — R51.9 SINUS HEADACHE: ICD-10-CM

## 2017-12-26 DIAGNOSIS — R06.2 WHEEZING: Primary | ICD-10-CM

## 2017-12-26 DIAGNOSIS — R06.2 WHEEZING: ICD-10-CM

## 2017-12-26 PROCEDURE — 99214 OFFICE O/P EST MOD 30 MIN: CPT | Mod: 25,S$GLB,, | Performed by: PHYSICIAN ASSISTANT

## 2017-12-26 PROCEDURE — 96372 THER/PROPH/DIAG INJ SC/IM: CPT | Mod: S$GLB,,, | Performed by: FAMILY MEDICINE

## 2017-12-26 PROCEDURE — 71020 XR CHEST PA AND LATERAL: CPT | Mod: TC,PO

## 2017-12-26 PROCEDURE — 71020 XR CHEST PA AND LATERAL: CPT | Mod: 26,,, | Performed by: RADIOLOGY

## 2017-12-26 PROCEDURE — 99999 PR PBB SHADOW E&M-EST. PATIENT-LVL IV: CPT | Mod: PBBFAC,,, | Performed by: PHYSICIAN ASSISTANT

## 2017-12-26 RX ORDER — OSELTAMIVIR PHOSPHATE 75 MG/1
75 CAPSULE ORAL 2 TIMES DAILY
Qty: 10 CAPSULE | Refills: 0 | Status: SHIPPED | OUTPATIENT
Start: 2017-12-26 | End: 2017-12-31

## 2017-12-26 RX ORDER — PROMETHAZINE HYDROCHLORIDE AND DEXTROMETHORPHAN HYDROBROMIDE 6.25; 15 MG/5ML; MG/5ML
5 SYRUP ORAL EVERY 6 HOURS PRN
Qty: 118 ML | Refills: 0 | Status: SHIPPED | OUTPATIENT
Start: 2017-12-26 | End: 2018-01-02 | Stop reason: SDUPTHER

## 2017-12-26 RX ORDER — KETOROLAC TROMETHAMINE 30 MG/ML
30 INJECTION, SOLUTION INTRAMUSCULAR; INTRAVENOUS
Status: COMPLETED | OUTPATIENT
Start: 2017-12-26 | End: 2017-12-26

## 2017-12-26 RX ADMIN — KETOROLAC TROMETHAMINE 30 MG: 30 INJECTION, SOLUTION INTRAMUSCULAR; INTRAVENOUS at 01:12

## 2017-12-26 NOTE — PROGRESS NOTES
Subjective:       Patient ID: Emmy Padilla is a 67 y.o. female.    Chief Complaint: URI    URI    This is a new problem. The current episode started in the past 7 days (started Friday 12/22/17). The problem has been unchanged. There has been no fever. Associated symptoms include congestion, coughing, headaches, sinus pain and a sore throat. Pertinent negatives include no abdominal pain, chest pain, diarrhea, dysuria, ear pain, joint pain, joint swelling, nausea, neck pain, plugged ear sensation, rash, rhinorrhea, sneezing, swollen glands, vomiting or wheezing. 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    PMR (polymyalgia rheumatica)     resolved    Rheumatoid arthritis 12/2014    Dr. Traore       Current Outpatient Prescriptions   Medication Sig Dispense Refill    clonazePAM (KLONOPIN) 1 MG tablet TAKE HALF TO 1 TABLET DAILY 90 tablet 1    duloxetine (CYMBALTA) 60 MG capsule TAKE 1 CAPSULE (60 MG TOTAL) BY MOUTH ONCE DAILY. 90 capsule 2    folic acid (FOLVITE) 1 MG tablet Take 1,000 mcg by mouth once daily.  8    LUBRICANT EYE DROPS, GLYC-PG, 1-0.3 % Drop       MENTHOL/CAMPHOR/PHEN/SALICYLIC (LIP BALM MEDICATED TOP)       methotrexate 2.5 MG Tab Take 12.5 mg by mouth every 7 days.      METHYL SALICYLATE/MENTHOL (MUSCLE RUB TOP)       prednisoLONE acetate (PRED FORTE) 1 % DrpS Place 1 drop into the right eye 4 (four) times daily.  5    tobramycin sulfate 0.3% (TOBREX) 0.3 % ophthalmic solution INSTIL 1 DROP IN RIGHT EYE 4 TIMES A DAY  0    tramadol (ULTRAM) 50 mg tablet Take 50 mg by mouth every 6 (six) hours as needed for Pain.      benzonatate (TESSALON) 100 MG capsule Take 1 capsule (100 mg total) by mouth 3 (three) times daily as needed for Cough. 30  "capsule 0    oseltamivir (TAMIFLU) 75 MG capsule Take 1 capsule (75 mg total) by mouth 2 (two) times daily. 10 capsule 0    promethazine-dextromethorphan (PROMETHAZINE-DM) 6.25-15 mg/5 mL Syrp Take 5 mLs by mouth every 6 (six) hours as needed (cough). 118 mL 0     No current facility-administered medications for this visit.        Review of Systems   HENT: Positive for congestion, sinus pain and sore throat. Negative for ear pain, rhinorrhea and sneezing.    Respiratory: Positive for cough. Negative for wheezing.    Cardiovascular: Negative for chest pain.   Gastrointestinal: Negative for abdominal pain, diarrhea, nausea and vomiting.   Genitourinary: Negative for dysuria.   Musculoskeletal: Negative for joint pain and neck pain.   Skin: Negative for rash.   Neurological: Positive for headaches.       Objective:   /80   Pulse 86   Temp 98.3 °F (36.8 °C)   Ht 5' 2" (1.575 m)   Wt 62.3 kg (137 lb 5.6 oz)   SpO2 98%   BMI 25.12 kg/m²      Physical Exam   Constitutional: She is oriented to person, place, and time. She appears well-developed and well-nourished. No distress.   HENT:   Head: Normocephalic and atraumatic.   Right Ear: Hearing, tympanic membrane, external ear and ear canal normal.   Left Ear: Hearing, tympanic membrane, external ear and ear canal normal.   Nose: No sinus tenderness. Right sinus exhibits no maxillary sinus tenderness and no frontal sinus tenderness. Left sinus exhibits no maxillary sinus tenderness and no frontal sinus tenderness.   Mouth/Throat: Uvula is midline, oropharynx is clear and moist and mucous membranes are normal. No oropharyngeal exudate, posterior oropharyngeal edema, posterior oropharyngeal erythema or tonsillar abscesses.   Eyes: Conjunctivae are normal. Pupils are equal, round, and reactive to light.   Neck: Normal range of motion. Neck supple.   Cardiovascular: Normal rate, regular rhythm and normal heart sounds.    Pulmonary/Chest: Effort normal and breath " sounds normal. No respiratory distress.   Coarse breath sounds   No wheezing, rhonchi or rales   No resp distress    Neurological: She is alert and oriented to person, place, and time.   Skin: Skin is warm.   Psychiatric: She has a normal mood and affect. Her behavior is normal. Judgment and thought content normal.   Vitals reviewed.        Lab Results   Component Value Date    WBC 7.08 08/29/2017    HGB 12.2 08/29/2017    HCT 38.2 08/29/2017     08/29/2017    CHOL 211 (H) 01/19/2017    TRIG 91 01/19/2017    HDL 50 01/19/2017    ALT 21 01/19/2017    AST 19 01/19/2017     08/29/2017    K 4.4 08/29/2017     08/29/2017    CREATININE 0.6 08/29/2017    BUN 25 (H) 08/29/2017    CO2 27 08/29/2017    TSH 0.906 01/19/2017    HGBA1C 5.7 01/19/2017       Assessment:       1. Wheezing    2. Tobacco abuse    3. Cough in adult patient    4. Sinus headache        Plan:   Wheezing  -     X-Ray Chest PA And Lateral; Future; Expected date: 12/26/2017    Tobacco abuse  -     X-Ray Chest PA And Lateral; Future; Expected date: 12/26/2017    Cough in adult patient  -     X-Ray Chest PA And Lateral; Future; Expected date: 12/26/2017    Sinus headache  -     ketorolac injection 30 mg; Inject 1 mL (30 mg total) into the muscle one time.    Other orders  -     promethazine-dextromethorphan (PROMETHAZINE-DM) 6.25-15 mg/5 mL Syrp; Take 5 mLs by mouth every 6 (six) hours as needed (cough).  Dispense: 118 mL; Refill: 0  -     oseltamivir (TAMIFLU) 75 MG capsule; Take 1 capsule (75 mg total) by mouth 2 (two) times daily.  Dispense: 10 capsule; Refill: 0    Patients symptoms consistent with flu   Will treat   Xray to r/o PNA   Hydrate   Follow up next week if no better, ER if wosening

## 2017-12-27 ENCOUNTER — TELEPHONE (OUTPATIENT)
Dept: INTERNAL MEDICINE | Facility: CLINIC | Age: 67
End: 2017-12-27

## 2017-12-27 NOTE — TELEPHONE ENCOUNTER
----- Message from Magnolia Lara sent at 12/27/2017  9:18 AM CST -----  Contact: pt  The pt wants to be worked in today for a influenza  test, the pt request a call at [phone]///[id number]

## 2017-12-27 NOTE — TELEPHONE ENCOUNTER
She can come to  to be evaluated/swabbed for flu test if she wants to.  But if she is taking tamiflu, then I do not know that doing a test will be accurate.

## 2017-12-27 NOTE — TELEPHONE ENCOUNTER
Called pt and she said she saw Fide yesterday and she was told she has the flu.  She said she doesn't think she has that.  She did not swab her for it.  She has watery eyes,  wheezing,  congestion, cough, she had a little bit of headache yesterday, she did blow out clear mucus with some bloddy streaks, but,  no body aches, no fever.  She thinks she has an URI.  She did an chest xray but has not got the results.  She wanted to be worked in today for flu test.   Please advise

## 2017-12-27 NOTE — TELEPHONE ENCOUNTER
Called pt and let her know that Dr. Gutierrez said she can come to  to be evaluated/swabbed for flu test is she wants to.  If she is taking the tamiflu, then doesn't know if it would be accurate.  She said they didn't have the Rx yesterday, so she just picked it up. Chest xray clear and no pneumonia is present.

## 2017-12-27 NOTE — TELEPHONE ENCOUNTER
----- Message from Ariana Larson sent at 12/27/2017  8:04 AM CST -----  Contact: Pt   States she's calling to see how can the flu be diagnosed without a swab/ was seen yesterday by Ms Fide Perez and can be reached [phone]//thanks/dbw

## 2017-12-29 ENCOUNTER — TELEPHONE (OUTPATIENT)
Dept: INTERNAL MEDICINE | Facility: CLINIC | Age: 67
End: 2017-12-29

## 2017-12-29 RX ORDER — BENZONATATE 100 MG/1
100 CAPSULE ORAL 3 TIMES DAILY PRN
Qty: 30 CAPSULE | Refills: 0 | Status: SHIPPED | OUTPATIENT
Start: 2017-12-29 | End: 2018-01-08

## 2017-12-29 NOTE — TELEPHONE ENCOUNTER
----- Message from Cade Burns sent at 12/29/2017  9:21 AM CST -----  Contact: self- [phone]  Would like to consult with nurse about medication; has questions; pt states has flu; please call berta at [phone] thx lj

## 2017-12-29 NOTE — TELEPHONE ENCOUNTER
Notified pt that a prescription for tessalon perles was sent to the pharmacy. Informed pt that cough associated with flu can linger. She verbalized understanding.

## 2017-12-29 NOTE — TELEPHONE ENCOUNTER
"Pt says, she was seen the day after [place] and was diagnosed with the flu. She has been taking tamiflu twice daily and cough medicine that was prescribed every 6 hours. She has been coughing uncontrollably. She's taking the cough medicine, but is afraid she is going to run out and be without anything for the holiday. She also complains of wheezing at night and coughing up blood tinged phlegm that is coming from her "sinus cavity, not the chest". 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 She has tried OTC delsym. It didn't help. She has tessalon perles in the past that worked. She would like to have something called in to help with cough. Denies fever or SOB  "

## 2018-01-02 ENCOUNTER — OFFICE VISIT (OUTPATIENT)
Dept: INTERNAL MEDICINE | Facility: CLINIC | Age: 68
End: 2018-01-02
Payer: MEDICARE

## 2018-01-02 ENCOUNTER — TELEPHONE (OUTPATIENT)
Dept: INTERNAL MEDICINE | Facility: CLINIC | Age: 68
End: 2018-01-02

## 2018-01-02 VITALS
DIASTOLIC BLOOD PRESSURE: 68 MMHG | BODY MASS INDEX: 25.51 KG/M2 | HEIGHT: 61 IN | WEIGHT: 135.13 LBS | TEMPERATURE: 99 F | SYSTOLIC BLOOD PRESSURE: 118 MMHG | OXYGEN SATURATION: 96 % | HEART RATE: 109 BPM

## 2018-01-02 DIAGNOSIS — R06.2 WHEEZING: ICD-10-CM

## 2018-01-02 DIAGNOSIS — J40 BRONCHITIS: ICD-10-CM

## 2018-01-02 DIAGNOSIS — R05.9 COUGH: Primary | ICD-10-CM

## 2018-01-02 PROCEDURE — 99213 OFFICE O/P EST LOW 20 MIN: CPT | Mod: 25,S$GLB,, | Performed by: FAMILY MEDICINE

## 2018-01-02 PROCEDURE — 99999 PR PBB SHADOW E&M-EST. PATIENT-LVL III: CPT | Mod: PBBFAC,,, | Performed by: FAMILY MEDICINE

## 2018-01-02 PROCEDURE — 94640 AIRWAY INHALATION TREATMENT: CPT | Mod: S$GLB,,, | Performed by: FAMILY MEDICINE

## 2018-01-02 PROCEDURE — 96372 THER/PROPH/DIAG INJ SC/IM: CPT | Mod: 59,S$GLB,, | Performed by: FAMILY MEDICINE

## 2018-01-02 RX ORDER — ALBUTEROL SULFATE 0.83 MG/ML
2.5 SOLUTION RESPIRATORY (INHALATION)
Status: COMPLETED | OUTPATIENT
Start: 2018-01-02 | End: 2018-01-02

## 2018-01-02 RX ORDER — METHYLPREDNISOLONE ACETATE 80 MG/ML
80 INJECTION, SUSPENSION INTRA-ARTICULAR; INTRALESIONAL; INTRAMUSCULAR; SOFT TISSUE
Status: COMPLETED | OUTPATIENT
Start: 2018-01-02 | End: 2018-01-02

## 2018-01-02 RX ORDER — ALBUTEROL SULFATE 90 UG/1
2 AEROSOL, METERED RESPIRATORY (INHALATION) EVERY 4 HOURS PRN
Qty: 1 EACH | Refills: 0 | Status: SHIPPED | OUTPATIENT
Start: 2018-01-02 | End: 2018-02-28

## 2018-01-02 RX ORDER — ALBUTEROL SULFATE 2.5 MG/.5ML
2.5 SOLUTION RESPIRATORY (INHALATION)
Status: DISCONTINUED | OUTPATIENT
Start: 2018-01-02 | End: 2018-01-02

## 2018-01-02 RX ORDER — PROMETHAZINE HYDROCHLORIDE AND DEXTROMETHORPHAN HYDROBROMIDE 6.25; 15 MG/5ML; MG/5ML
5 SYRUP ORAL EVERY 6 HOURS PRN
Qty: 240 ML | Refills: 0 | Status: SHIPPED | OUTPATIENT
Start: 2018-01-02 | End: 2018-04-07 | Stop reason: SDUPTHER

## 2018-01-02 RX ADMIN — ALBUTEROL SULFATE 2.5 MG: 0.83 SOLUTION RESPIRATORY (INHALATION) at 03:01

## 2018-01-02 RX ADMIN — METHYLPREDNISOLONE ACETATE 80 MG: 80 INJECTION, SUSPENSION INTRA-ARTICULAR; INTRALESIONAL; INTRAMUSCULAR; SOFT TISSUE at 03:01

## 2018-01-02 NOTE — TELEPHONE ENCOUNTER
Pt says, she still has cough and head and nasal congestion. She has been blowing out thick greenish yellow mucus. She has taken all of the cough syrup she was prescribed in urgent care and the tessalon perles isn't helping much with the cough. She doesn't know what else to do. She doesn't want to go back to urgent care. If she has to be seen, she prefers to see you. She has been sick since Christmas Eve. Denies fever, SOB.

## 2018-01-02 NOTE — PROGRESS NOTES
"Subjective:      Patient ID: Emmy Padilla is a 67 y.o. female.    Chief Complaint: Cough (congestion)    HPI  66 yo female here with c/o cough/congestion/wheezing.  Seen 12/26/17, treated for flu.  States fever has been gone now for about 5 days but congestion/cough is worse.  Hears some wheezing/singing sounds with breathing.  Having coughing spells.  Out of cough syrup.  Tessalon helping some.  Stopped smoking 12/24/17    Past Medical History:   Diagnosis Date    Depression     Dermatomyositis     resolved    History of detached retina repair 08/2016    right    Leos's neuroma 4/2014    bilateral - no surgery    Osteoporosis     BMD 2/9/07 spine -3.5, hip -2.8.    PMR (polymyalgia rheumatica)     resolved    Rheumatoid arthritis 12/2014    Dr. Traore     Family History   Problem Relation Age of Onset    Cancer Father      Esophageal    Cancer Mother     Cancer Brother 60     colon CA    Hypertension Brother     Hyperlipidemia Brother     Cancer Maternal Grandmother 92     breast CA     Past Surgical History:   Procedure Laterality Date    CONDYLOMA EXCISION/FULGURATION  2003    Anal    HAND SURGERY Right     middle finger trigger and swaying     RETINAL DETACHMENT SURGERY Right 8/2106    RETINAL DETACHMENT SURGERY       Social History   Substance Use Topics    Smoking status: Current Every Day Smoker     Packs/day: 0.50     Types: Cigarettes    Smokeless tobacco: Never Used    Alcohol use No       /68   Pulse 109   Temp 98.7 °F (37.1 °C) (Tympanic)   Ht 5' 1" (1.549 m)   Wt 61.3 kg (135 lb 2.3 oz)   SpO2 96%   BMI 25.53 kg/m²     Review of Systems   Constitutional: Positive for fatigue. Negative for chills and fever.   HENT: Positive for congestion.    Respiratory: Positive for cough, chest tightness and wheezing.    Cardiovascular: Negative for chest pain.     Objective:     Physical Exam   Constitutional: She is oriented to person, place, and time. She appears " well-developed and well-nourished.   HENT:   Right Ear: External ear normal.   Left Ear: External ear normal.   Mouth/Throat: Oropharynx is clear and moist.   Eyes: Pupils are equal, round, and reactive to light.   Neck: Normal range of motion. Neck supple.   Cardiovascular: Normal rate, regular rhythm and normal heart sounds.    Pulmonary/Chest: Effort normal. No respiratory distress. She has wheezes. She has no rales.   +rhonchi   Abdominal: Soft. Bowel sounds are normal. She exhibits no distension.   Musculoskeletal: She exhibits no edema.   Lymphadenopathy:     She has no cervical adenopathy.   Neurological: She is alert and oriented to person, place, and time.   Skin: Skin is warm and dry.   Nursing note and vitals reviewed.      Lab Results   Component Value Date    WBC 7.08 08/29/2017    HGB 12.2 08/29/2017    HCT 38.2 08/29/2017     08/29/2017    CHOL 211 (H) 01/19/2017    TRIG 91 01/19/2017    HDL 50 01/19/2017    ALT 21 01/19/2017    AST 19 01/19/2017     08/29/2017    K 4.4 08/29/2017     08/29/2017    CREATININE 0.6 08/29/2017    BUN 25 (H) 08/29/2017    CO2 27 08/29/2017    TSH 0.906 01/19/2017    HGBA1C 5.7 01/19/2017       Assessment:     1. Cough    2. Wheezing    3. Bronchitis       Plan:   Cough    Wheezing    Bronchitis    Other orders  -     Discontinue: albuterol sulfate nebulizer solution 2.5 mg; Take 2.5 mg by nebulization one time.  -     albuterol nebulizer solution 2.5 mg; Take 3 mLs (2.5 mg total) by nebulization one time.  -     methylPREDNISolone acetate injection 80 mg; Inject 1 mL (80 mg total) into the muscle one time.  -     promethazine-dextromethorphan (PROMETHAZINE-DM) 6.25-15 mg/5 mL Syrp; Take 5 mLs by mouth every 6 (six) hours as needed (cough).  Dispense: 240 mL; Refill: 0  -     albuterol 90 mcg/actuation inhaler; Inhale 2 puffs into the lungs every 4 (four) hours as needed for Wheezing.  Dispense: 1 each; Refill: 0    After breathing treatment, wheezes  improved.   Pulse ox came up to 98%  Depo medrol 80IM now  Inhaler PRN, cough syrup PRN  May need more oral steroids, pt wants to wait and see if shot is enough  Ok to return to work 1/4/18  F/u PRN

## 2018-01-02 NOTE — TELEPHONE ENCOUNTER
----- Message from Kelly Morales sent at 1/2/2018  8:51 AM CST -----  Contact: pt  States she was diagnosed with the Flu on 12/26 and her fever is broke. States she finished all of her medicine and she's still coughing and congested. States she would like to speak to the nurse. Please call pt at [phone]. Thank you

## 2018-01-04 ENCOUNTER — TELEPHONE (OUTPATIENT)
Dept: INTERNAL MEDICINE | Facility: CLINIC | Age: 68
End: 2018-01-04

## 2018-01-04 RX ORDER — PREDNISONE 20 MG/1
20 TABLET ORAL DAILY
Qty: 5 TABLET | Refills: 0 | Status: SHIPPED | OUTPATIENT
Start: 2018-01-04 | End: 2018-01-09

## 2018-01-04 NOTE — TELEPHONE ENCOUNTER
----- Message from Samreen Espino sent at 1/4/2018 11:10 AM CST -----  Pt at [phone]//states she is suppose to call your office today to let Dr know how she is doing//was given a steroid shot on Tuesday//please call to discuss//jem//gael

## 2018-01-04 NOTE — TELEPHONE ENCOUNTER
Called pt and let her know that Dr. Gutierrez said she needs to take oral steroids for a few days.  She sent in 20 mg tablet to take 1 daily.  She can return to work tomorrow.

## 2018-01-04 NOTE — TELEPHONE ENCOUNTER
Pt states, she didn't go to work today because she's still full of secretions. She's coughing, but not as bad. She had a big crackling sound to her rt ear. After that her head felt clear. She did have a brief nose bleed and a large clot came out. She felt better after that. No fever or  Wheezing, just still a little congested.

## 2018-01-04 NOTE — TELEPHONE ENCOUNTER
She needs to take some oral steroids for a few days.   I am sending those in.  She can return to work tomorrow.

## 2018-02-19 ENCOUNTER — PATIENT OUTREACH (OUTPATIENT)
Dept: ADMINISTRATIVE | Facility: HOSPITAL | Age: 68
End: 2018-02-19

## 2018-02-19 DIAGNOSIS — Z13.6 ENCOUNTER FOR SCREENING FOR CARDIOVASCULAR DISORDERS: ICD-10-CM

## 2018-02-19 DIAGNOSIS — R53.1 WEAKNESS: ICD-10-CM

## 2018-02-19 DIAGNOSIS — Z00.00 BLOOD TESTS FOR ROUTINE GENERAL PHYSICAL EXAMINATION: Primary | ICD-10-CM

## 2018-02-19 NOTE — LETTER
February 19, 2018      We are seeing Emmy Padilla, 1950, at [facility]. Daniel Gutierrez MD is their primary care physician. To help with our [place] maintenance records could you please send the following:     MOST RECENT COLONOSCOPY    Please fax to [facility] at [phone], attention Clary Roca.     Thank-you in advance for your assistance. If you have any questions or concerns please contact me at [phone].     Clary STYLES LPN  Care Coordination Department  [facility]  [phone]

## 2018-02-22 ENCOUNTER — PATIENT OUTREACH (OUTPATIENT)
Dept: ADMINISTRATIVE | Facility: HOSPITAL | Age: 68
End: 2018-02-22

## 2018-02-28 ENCOUNTER — LAB VISIT (OUTPATIENT)
Dept: LAB | Facility: HOSPITAL | Age: 68
End: 2018-02-28
Attending: FAMILY MEDICINE
Payer: MEDICARE

## 2018-02-28 ENCOUNTER — OFFICE VISIT (OUTPATIENT)
Dept: INTERNAL MEDICINE | Facility: CLINIC | Age: 68
End: 2018-02-28
Payer: MEDICARE

## 2018-02-28 VITALS
DIASTOLIC BLOOD PRESSURE: 70 MMHG | SYSTOLIC BLOOD PRESSURE: 118 MMHG | WEIGHT: 132.25 LBS | HEIGHT: 62 IN | BODY MASS INDEX: 24.34 KG/M2 | HEART RATE: 78 BPM | TEMPERATURE: 98 F

## 2018-02-28 DIAGNOSIS — M06.9 RHEUMATOID ARTHRITIS, INVOLVING UNSPECIFIED SITE, UNSPECIFIED RHEUMATOID FACTOR PRESENCE: ICD-10-CM

## 2018-02-28 DIAGNOSIS — R53.1 WEAKNESS: ICD-10-CM

## 2018-02-28 DIAGNOSIS — D84.9 IMMUNOSUPPRESSED STATUS: ICD-10-CM

## 2018-02-28 DIAGNOSIS — Z00.00 BLOOD TESTS FOR ROUTINE GENERAL PHYSICAL EXAMINATION: ICD-10-CM

## 2018-02-28 DIAGNOSIS — Z13.6 ENCOUNTER FOR SCREENING FOR CARDIOVASCULAR DISORDERS: ICD-10-CM

## 2018-02-28 DIAGNOSIS — M81.0 OSTEOPOROSIS, UNSPECIFIED OSTEOPOROSIS TYPE, UNSPECIFIED PATHOLOGICAL FRACTURE PRESENCE: ICD-10-CM

## 2018-02-28 DIAGNOSIS — R25.2 LEG CRAMPS: ICD-10-CM

## 2018-02-28 DIAGNOSIS — Z00.00 ANNUAL PHYSICAL EXAM: Primary | ICD-10-CM

## 2018-02-28 LAB
ALBUMIN SERPL BCP-MCNC: 3.4 G/DL
ALP SERPL-CCNC: 110 U/L
ALT SERPL W/O P-5'-P-CCNC: 18 U/L
ANION GAP SERPL CALC-SCNC: 10 MMOL/L
AST SERPL-CCNC: 19 U/L
BASOPHILS # BLD AUTO: 0.08 K/UL
BASOPHILS NFR BLD: 1.3 %
BILIRUB SERPL-MCNC: 0.3 MG/DL
BUN SERPL-MCNC: 19 MG/DL
CALCIUM SERPL-MCNC: 9.3 MG/DL
CCP AB SER IA-ACNC: 1175.9 U/ML
CHLORIDE SERPL-SCNC: 105 MMOL/L
CHOLEST SERPL-MCNC: 214 MG/DL
CHOLEST/HDLC SERPL: 4.1 {RATIO}
CO2 SERPL-SCNC: 25 MMOL/L
CREAT SERPL-MCNC: 0.7 MG/DL
DIFFERENTIAL METHOD: ABNORMAL
EOSINOPHIL # BLD AUTO: 0.1 K/UL
EOSINOPHIL NFR BLD: 1.5 %
ERYTHROCYTE [DISTWIDTH] IN BLOOD BY AUTOMATED COUNT: 13.8 %
ERYTHROCYTE [SEDIMENTATION RATE] IN BLOOD BY WESTERGREN METHOD: 44 MM/HR
EST. GFR  (AFRICAN AMERICAN): >60 ML/MIN/1.73 M^2
EST. GFR  (NON AFRICAN AMERICAN): >60 ML/MIN/1.73 M^2
FERRITIN SERPL-MCNC: 53 NG/ML
GLUCOSE SERPL-MCNC: 87 MG/DL
HCT VFR BLD AUTO: 38 %
HDLC SERPL-MCNC: 52 MG/DL
HDLC SERPL: 24.3 %
HGB BLD-MCNC: 12.1 G/DL
IMM GRANULOCYTES # BLD AUTO: 0.01 K/UL
IMM GRANULOCYTES NFR BLD AUTO: 0.2 %
LDLC SERPL CALC-MCNC: 144.2 MG/DL
LYMPHOCYTES # BLD AUTO: 1.6 K/UL
LYMPHOCYTES NFR BLD: 26 %
MCH RBC QN AUTO: 29.7 PG
MCHC RBC AUTO-ENTMCNC: 31.8 G/DL
MCV RBC AUTO: 93 FL
MONOCYTES # BLD AUTO: 0.5 K/UL
MONOCYTES NFR BLD: 8.8 %
NEUTROPHILS # BLD AUTO: 3.8 K/UL
NEUTROPHILS NFR BLD: 62.2 %
NONHDLC SERPL-MCNC: 162 MG/DL
NRBC BLD-RTO: 0 /100 WBC
PLATELET # BLD AUTO: 300 K/UL
PMV BLD AUTO: 10.8 FL
POTASSIUM SERPL-SCNC: 4.5 MMOL/L
PROT SERPL-MCNC: 7 G/DL
RBC # BLD AUTO: 4.07 M/UL
SODIUM SERPL-SCNC: 140 MMOL/L
TRIGL SERPL-MCNC: 89 MG/DL
TSH SERPL DL<=0.005 MIU/L-ACNC: 0.9 UIU/ML
WBC # BLD AUTO: 6.16 K/UL

## 2018-02-28 PROCEDURE — 86038 ANTINUCLEAR ANTIBODIES: CPT

## 2018-02-28 PROCEDURE — 82728 ASSAY OF FERRITIN: CPT

## 2018-02-28 PROCEDURE — 99397 PER PM REEVAL EST PAT 65+ YR: CPT | Mod: S$GLB,,, | Performed by: FAMILY MEDICINE

## 2018-02-28 PROCEDURE — 85651 RBC SED RATE NONAUTOMATED: CPT

## 2018-02-28 PROCEDURE — 80061 LIPID PANEL: CPT

## 2018-02-28 PROCEDURE — G0009 ADMIN PNEUMOCOCCAL VACCINE: HCPCS | Mod: S$GLB,,, | Performed by: FAMILY MEDICINE

## 2018-02-28 PROCEDURE — 90732 PPSV23 VACC 2 YRS+ SUBQ/IM: CPT | Mod: S$GLB,,, | Performed by: FAMILY MEDICINE

## 2018-02-28 PROCEDURE — 80053 COMPREHEN METABOLIC PANEL: CPT

## 2018-02-28 PROCEDURE — 99999 PR PBB SHADOW E&M-EST. PATIENT-LVL III: CPT | Mod: PBBFAC,,, | Performed by: FAMILY MEDICINE

## 2018-02-28 PROCEDURE — 84443 ASSAY THYROID STIM HORMONE: CPT

## 2018-02-28 PROCEDURE — 86200 CCP ANTIBODY: CPT

## 2018-02-28 PROCEDURE — 99499 UNLISTED E&M SERVICE: CPT | Mod: S$GLB,,, | Performed by: FAMILY MEDICINE

## 2018-02-28 PROCEDURE — 86431 RHEUMATOID FACTOR QUANT: CPT

## 2018-02-28 PROCEDURE — 36415 COLL VENOUS BLD VENIPUNCTURE: CPT | Mod: PO

## 2018-02-28 PROCEDURE — 85025 COMPLETE CBC W/AUTO DIFF WBC: CPT

## 2018-02-28 NOTE — PROGRESS NOTES
"Subjective:      Patient ID: Emmy Padilla is a 67 y.o. female.    Chief Complaint:  General exam    HPI  66 yo female with RA off her MTX since Dec, retinal detachment, PRM, Osteoporosis on Prolia, anxiety here for annual/labs.  She would like to see Rheum at [facility]. Was seeing Dr. Rogers  Stopped her MTX in Dec after flu/bronchitis bout  Suffers mainly with R hand pain.  Under care of retinal specialist  Smoking about 10 cigarettes daily  Followed at [facility] for Mammo/Colon screening. Due in June  Unsure when last Prolia injection was  Bowels moving ok.  Some reflux, using tums    Past Medical History:   Diagnosis Date    Depression     Dermatomyositis     resolved    History of detached retina repair 08/2016    right    Leos's neuroma 4/2014    bilateral - no surgery    Osteoporosis     BMD 2/9/07 spine -3.5, hip -2.8.    PMR (polymyalgia rheumatica)     resolved    Rheumatoid arthritis 12/2014    Dr. Traore     Family History   Problem Relation Age of Onset    Cancer Father      Esophageal    Cancer Mother     Cancer Brother 60     colon CA    Hypertension Brother     Hyperlipidemia Brother     Cancer Maternal Grandmother 92     breast CA     Past Surgical History:   Procedure Laterality Date    CONDYLOMA EXCISION/FULGURATION  2003    Anal    HAND SURGERY Right     middle finger trigger and swaying     RETINAL DETACHMENT SURGERY Right 8/2106    RETINAL DETACHMENT SURGERY       Social History   Substance Use Topics    Smoking status: Current Every Day Smoker     Packs/day: 0.50     Types: Cigarettes    Smokeless tobacco: Never Used    Alcohol use No       /70   Pulse 78   Temp 98.2 °F (36.8 °C) (Tympanic)   Ht 5' 2" (1.575 m)   Wt 60 kg (132 lb 4.4 oz)   BMI 24.19 kg/m²     Review of Systems   Constitutional: Negative for activity change, appetite change, chills, diaphoresis, fatigue, fever and unexpected weight change.   HENT: Negative for ear pain, hearing loss, postnasal " drip, rhinorrhea and tinnitus.    Eyes: Positive for visual disturbance.   Respiratory: Negative for cough, shortness of breath and wheezing.    Cardiovascular: Negative for chest pain, palpitations and leg swelling.   Gastrointestinal: Negative for abdominal distention, abdominal pain, constipation and diarrhea.   Genitourinary: Negative for dysuria, frequency, hematuria and urgency.   Musculoskeletal: Positive for arthralgias. Negative for back pain and joint swelling.   Skin: Negative for rash.   Neurological: Negative for weakness and headaches.   Hematological: Negative for adenopathy.   Psychiatric/Behavioral: Negative for confusion and decreased concentration.       Objective:     Physical Exam   Constitutional: She is oriented to person, place, and time. She appears well-developed and well-nourished. No distress.   HENT:   Right Ear: External ear normal.   Left Ear: External ear normal.   Nose: Nose normal.   Mouth/Throat: Oropharynx is clear and moist.   Eyes: Conjunctivae are normal. Pupils are equal, round, and reactive to light.   Neck: Normal range of motion. Neck supple. Carotid bruit is not present. No thyromegaly present.   Cardiovascular: Normal rate, regular rhythm and normal heart sounds.  Exam reveals no gallop.    No murmur heard.  Pulmonary/Chest: Effort normal and breath sounds normal. No respiratory distress. She has no wheezes. She has no rales.   Abdominal: Soft. Bowel sounds are normal. She exhibits no distension. There is no tenderness. There is no guarding.   Musculoskeletal: She exhibits deformity.   Lymphadenopathy:     She has no cervical adenopathy.   Neurological: She is alert and oriented to person, place, and time. No cranial nerve deficit.   Skin: Skin is warm and dry. No rash noted.   Psychiatric: She has a normal mood and affect. Her behavior is normal. Judgment and thought content normal.   Nursing note and vitals reviewed.      Lab Results   Component Value Date    WBC 7.08  08/29/2017    HGB 12.2 08/29/2017    HCT 38.2 08/29/2017     08/29/2017    CHOL 211 (H) 01/19/2017    TRIG 91 01/19/2017    HDL 50 01/19/2017    ALT 21 01/19/2017    AST 19 01/19/2017     08/29/2017    K 4.4 08/29/2017     08/29/2017    CREATININE 0.6 08/29/2017    BUN 25 (H) 08/29/2017    CO2 27 08/29/2017    TSH 0.906 01/19/2017    HGBA1C 5.7 01/19/2017       Assessment:     1. Annual physical exam    2. Rheumatoid arthritis, involving unspecified site, unspecified rheumatoid factor presence    3. Immunosuppressed status    4. Leg cramps    5. Osteoporosis, unspecified osteoporosis type, unspecified pathological fracture presence         Plan:     Annual physical exam    Rheumatoid arthritis, involving unspecified site, unspecified rheumatoid factor presence  -     Sedimentation rate, manual; Future; Expected date: 02/28/2018  -     Rheumatoid factor; Future; Expected date: 02/28/2018  -     NIGEL; Future; Expected date: 02/28/2018  -     CYCLIC CITRUL PEPTIDE ANTIBODY, IGG; Future; Expected date: 02/28/2018  -     Ambulatory Referral to Rheumatology    Immunosuppressed status    Leg cramps  -     Ferritin; Future; Expected date: 02/28/2018    Osteoporosis, unspecified osteoporosis type, unspecified pathological fracture presence    Other orders  -     (In Office Administered) Pneumococcal Polysaccharide Vaccine (23 Valent) (SQ/IM)    Update labs and RF labs as well  Rheum/Dr T consult  Check iron as well with leg cramps. More water needed and stretching.  Recommend PT, pt to think about it.  Pneumo 23 updated today  Cont current anxiety meds  Work on smoking cessation  Recommend Zantac/Pepcid PRN reflux symptoms  F/u annually and PRN

## 2018-03-01 ENCOUNTER — TELEPHONE (OUTPATIENT)
Dept: RHEUMATOLOGY | Facility: CLINIC | Age: 68
End: 2018-03-01

## 2018-03-01 ENCOUNTER — PATIENT MESSAGE (OUTPATIENT)
Dept: INTERNAL MEDICINE | Facility: CLINIC | Age: 68
End: 2018-03-01

## 2018-03-01 LAB
ANA SER QL IF: NORMAL
RHEUMATOID FACT SERPL-ACNC: 385 IU/ML

## 2018-03-01 NOTE — TELEPHONE ENCOUNTER
Called pt and let know that lab levels for RA are elevated,  she needs to see Dr. MUNIZ for her RA, she said that she will call and schedule something, but, she sees Dr. Sherman and she may go see him.

## 2018-03-01 NOTE — TELEPHONE ENCOUNTER
Called patient regarding referral from Dr. Gregory, no answer, left message for pt to call clinic back.,

## 2018-03-02 NOTE — TELEPHONE ENCOUNTER
Called patient regarding referral from Dr. Bharath Gutierrez, pt stated she did not want to make an appointment with Rheumatology.

## 2018-03-19 DIAGNOSIS — F41.1 ANXIETY STATE: ICD-10-CM

## 2018-03-19 RX ORDER — CLONAZEPAM 1 MG/1
TABLET ORAL
Qty: 90 TABLET | Refills: 1 | Status: SHIPPED | OUTPATIENT
Start: 2018-03-19 | End: 2018-09-17 | Stop reason: SDUPTHER

## 2018-04-08 RX ORDER — PROMETHAZINE HYDROCHLORIDE AND DEXTROMETHORPHAN HYDROBROMIDE 6.25; 15 MG/5ML; MG/5ML
5 SYRUP ORAL EVERY 6 HOURS PRN
Qty: 240 ML | Refills: 0 | Status: SHIPPED | OUTPATIENT
Start: 2018-04-08 | End: 2018-04-18

## 2018-04-30 RX ORDER — DULOXETIN HYDROCHLORIDE 60 MG/1
CAPSULE, DELAYED RELEASE ORAL
Qty: 90 CAPSULE | Refills: 2 | Status: SHIPPED | OUTPATIENT
Start: 2018-04-30 | End: 2019-02-06 | Stop reason: SDUPTHER

## 2018-08-08 RX ORDER — PROMETHAZINE HYDROCHLORIDE AND DEXTROMETHORPHAN HYDROBROMIDE 6.25; 15 MG/5ML; MG/5ML
5 SYRUP ORAL EVERY 6 HOURS PRN
Qty: 240 ML | Refills: 0 | OUTPATIENT
Start: 2018-08-08 | End: 2018-08-18

## 2018-09-17 DIAGNOSIS — F41.1 ANXIETY STATE: ICD-10-CM

## 2018-09-17 RX ORDER — CLONAZEPAM 1 MG/1
TABLET ORAL
Qty: 90 TABLET | Refills: 0 | Status: SHIPPED | OUTPATIENT
Start: 2018-09-17 | End: 2018-12-10 | Stop reason: SDUPTHER

## 2018-09-18 ENCOUNTER — OFFICE VISIT (OUTPATIENT)
Dept: INTERNAL MEDICINE | Facility: CLINIC | Age: 68
End: 2018-09-18
Payer: MEDICARE

## 2018-09-18 VITALS
DIASTOLIC BLOOD PRESSURE: 72 MMHG | TEMPERATURE: 98 F | HEIGHT: 62 IN | HEART RATE: 82 BPM | BODY MASS INDEX: 24.99 KG/M2 | OXYGEN SATURATION: 97 % | SYSTOLIC BLOOD PRESSURE: 120 MMHG | WEIGHT: 135.81 LBS

## 2018-09-18 DIAGNOSIS — R06.2 WHEEZING: ICD-10-CM

## 2018-09-18 DIAGNOSIS — D84.9 IMMUNOSUPPRESSED STATUS: ICD-10-CM

## 2018-09-18 DIAGNOSIS — R09.82 PND (POST-NASAL DRIP): ICD-10-CM

## 2018-09-18 DIAGNOSIS — R05.9 COUGH: Primary | ICD-10-CM

## 2018-09-18 DIAGNOSIS — J06.9 UPPER RESPIRATORY TRACT INFECTION, UNSPECIFIED TYPE: ICD-10-CM

## 2018-09-18 PROCEDURE — 99214 OFFICE O/P EST MOD 30 MIN: CPT | Mod: PBBFAC,PO | Performed by: FAMILY MEDICINE

## 2018-09-18 PROCEDURE — 1101F PT FALLS ASSESS-DOCD LE1/YR: CPT | Mod: CPTII,,, | Performed by: FAMILY MEDICINE

## 2018-09-18 PROCEDURE — 99499 UNLISTED E&M SERVICE: CPT | Mod: S$GLB,,, | Performed by: FAMILY MEDICINE

## 2018-09-18 PROCEDURE — 96372 THER/PROPH/DIAG INJ SC/IM: CPT | Mod: PBBFAC,PO

## 2018-09-18 PROCEDURE — 99213 OFFICE O/P EST LOW 20 MIN: CPT | Mod: S$PBB,,, | Performed by: FAMILY MEDICINE

## 2018-09-18 PROCEDURE — 99999 PR PBB SHADOW E&M-EST. PATIENT-LVL IV: CPT | Mod: PBBFAC,,, | Performed by: FAMILY MEDICINE

## 2018-09-18 RX ORDER — ALBUTEROL SULFATE 90 UG/1
2 AEROSOL, METERED RESPIRATORY (INHALATION) EVERY 4 HOURS PRN
Qty: 18 G | Refills: 0 | Status: SHIPPED | OUTPATIENT
Start: 2018-09-18 | End: 2019-09-17 | Stop reason: SDUPTHER

## 2018-09-18 RX ORDER — PROMETHAZINE HYDROCHLORIDE AND DEXTROMETHORPHAN HYDROBROMIDE 6.25; 15 MG/5ML; MG/5ML
5 SYRUP ORAL NIGHTLY PRN
Qty: 118 ML | Refills: 0 | Status: SHIPPED | OUTPATIENT
Start: 2018-09-18 | End: 2018-09-28

## 2018-09-18 RX ORDER — FOLIC ACID 1 MG/1
TABLET ORAL
COMMUNITY
Start: 2018-09-05 | End: 2023-01-19

## 2018-09-18 RX ORDER — TRAMADOL HYDROCHLORIDE 50 MG/1
50 TABLET ORAL CONTINUOUS PRN
COMMUNITY
Start: 2018-06-11

## 2018-09-18 RX ORDER — MONTELUKAST SODIUM 10 MG/1
10 TABLET ORAL NIGHTLY
Qty: 30 TABLET | Refills: 0 | Status: SHIPPED | OUTPATIENT
Start: 2018-09-18 | End: 2018-10-20 | Stop reason: SDUPTHER

## 2018-09-18 RX ORDER — METHYLPREDNISOLONE ACETATE 80 MG/ML
80 INJECTION, SUSPENSION INTRA-ARTICULAR; INTRALESIONAL; INTRAMUSCULAR; SOFT TISSUE ONCE
Status: COMPLETED | OUTPATIENT
Start: 2018-09-18 | End: 2018-09-18

## 2018-09-18 RX ORDER — METHOTREXATE 2.5 MG/1
12.5 TABLET ORAL
COMMUNITY
Start: 2018-06-11 | End: 2020-06-05

## 2018-09-18 RX ORDER — PREDNISONE 5 MG/1
5 TABLET ORAL CONTINUOUS PRN
COMMUNITY
Start: 2018-06-11 | End: 2022-01-10 | Stop reason: ALTCHOICE

## 2018-09-18 RX ORDER — NAPROXEN SODIUM 220 MG
220 TABLET ORAL
COMMUNITY

## 2018-09-18 RX ADMIN — METHYLPREDNISOLONE ACETATE 80 MG: 80 INJECTION, SUSPENSION INTRALESIONAL; INTRAMUSCULAR; INTRASYNOVIAL; SOFT TISSUE at 08:09

## 2018-09-18 NOTE — PROGRESS NOTES
"Subjective:      Patient ID: Emmy Padilla is a 68 y.o. female.    Chief Complaint: Cough and Nasal Congestion    HPI  69 yo female with RA on MTX here with c/o cough/congestion/wheezing past several days.  No fever/chills.  Used rest of cough syrup.   Not using anything else.  Mostly non-productive  No CP/SOB  Having PND    Past Medical History:   Diagnosis Date    Depression     Dermatomyositis     resolved    History of detached retina repair 08/2016    right    Leos's neuroma 4/2014    bilateral - no surgery    Osteoporosis     BMD 2/9/07 spine -3.5, hip -2.8.    PMR (polymyalgia rheumatica)     resolved    Rheumatoid arthritis 12/2014    Dr. Traore     Family History   Problem Relation Age of Onset    Cancer Father         Esophageal    Cancer Mother     Cancer Brother 60        colon CA    Hypertension Brother     Hyperlipidemia Brother     Cancer Maternal Grandmother 92        breast CA     Past Surgical History:   Procedure Laterality Date    CONDYLOMA EXCISION/FULGURATION  2003    Anal    HAND SURGERY Right     middle finger trigger and swaying     nodules removed from rectal area      RETINAL DETACHMENT SURGERY Right 8/2106    RETINAL DETACHMENT SURGERY       Social History     Tobacco Use    Smoking status: Current Every Day Smoker     Packs/day: 0.50     Types: Cigarettes    Smokeless tobacco: Never Used   Substance Use Topics    Alcohol use: No     Alcohol/week: 0.0 oz    Drug use: No       /72 (BP Location: Left arm, Patient Position: Sitting, BP Method: Medium (Manual))   Pulse 82   Temp 97.9 °F (36.6 °C) (Tympanic)   Ht 5' 2" (1.575 m)   Wt 61.6 kg (135 lb 12.9 oz)   SpO2 97%   BMI 24.84 kg/m²     Review of Systems   Constitutional: Negative for chills and fever.   Respiratory: Positive for cough and wheezing.        Objective:     Physical Exam   Constitutional: She is oriented to person, place, and time. She appears well-developed and well-nourished. "   HENT:   Right Ear: External ear normal.   Left Ear: External ear normal.   Mouth/Throat: Oropharynx is clear and moist.   Neck: Neck supple.   Cardiovascular: Normal rate, regular rhythm and normal heart sounds.   Pulmonary/Chest: Effort normal. No respiratory distress. She has wheezes.   Lymphadenopathy:     She has no cervical adenopathy.   Neurological: She is alert and oriented to person, place, and time.   Nursing note and vitals reviewed.      Lab Results   Component Value Date    WBC 6.16 02/28/2018    HGB 12.1 02/28/2018    HCT 38.0 02/28/2018     02/28/2018    CHOL 214 (H) 02/28/2018    TRIG 89 02/28/2018    HDL 52 02/28/2018    ALT 18 02/28/2018    AST 19 02/28/2018     02/28/2018    K 4.5 02/28/2018     02/28/2018    CREATININE 0.7 02/28/2018    BUN 19 02/28/2018    CO2 25 02/28/2018    TSH 0.904 02/28/2018    HGBA1C 5.7 01/19/2017       Assessment:     1. Cough    2. PND (post-nasal drip)    3. Wheezing    4. Upper respiratory tract infection, unspecified type    5. Immunosuppressed status         Plan:     Cough    PND (post-nasal drip)    Wheezing    Upper respiratory tract infection, unspecified type    Immunosuppressed status    Other orders  -     methylPREDNISolone acetate injection 80 mg; Inject 1 mL (80 mg total) into the muscle once.  -     montelukast (SINGULAIR) 10 mg tablet; Take 1 tablet (10 mg total) by mouth every evening.  Dispense: 30 tablet; Refill: 0  -     albuterol (PROVENTIL/VENTOLIN HFA) 90 mcg/actuation inhaler; Inhale 2 puffs into the lungs every 4 (four) hours as needed for Wheezing. Rescue  Dispense: 18 g; Refill: 0  -     promethazine-dextromethorphan (PROMETHAZINE-DM) 6.25-15 mg/5 mL Syrp; Take 5 mLs by mouth nightly as needed.  Dispense: 118 mL; Refill: 0    Depo medrol 80mg now  Pt would rather avoid oral steroids if possible.  Will see how she responds  Start singulair nightly  Start inhaler and use cough syrup at bedtime  F/u if no improvement

## 2018-10-21 RX ORDER — MONTELUKAST SODIUM 10 MG/1
TABLET ORAL
Qty: 30 TABLET | Refills: 6 | Status: SHIPPED | OUTPATIENT
Start: 2018-10-21 | End: 2019-09-17 | Stop reason: SDUPTHER

## 2018-11-12 ENCOUNTER — TELEPHONE (OUTPATIENT)
Dept: INTERNAL MEDICINE | Facility: CLINIC | Age: 68
End: 2018-11-12

## 2018-11-12 DIAGNOSIS — F41.1 ANXIETY STATE: ICD-10-CM

## 2018-11-12 RX ORDER — CLONAZEPAM 1 MG/1
TABLET ORAL
Qty: 90 TABLET | Refills: 0 | OUTPATIENT
Start: 2018-11-12

## 2018-11-12 NOTE — TELEPHONE ENCOUNTER
Notified pt that she isn't due for a refill. She will need to call back when it's time for a refill. She verbalized understanding.

## 2018-11-12 NOTE — TELEPHONE ENCOUNTER
----- Message from Ariana Larson sent at 11/12/2018 12:43 PM CST -----  Contact: Pt   States she's calling to get a refill and can be reached at [phone]//ricardo/augustine   Was told by the pharm to contact her Dr's office and isn't due for a refill until December and is not out of it and it wasn't authorized and doesn't need it right now / will need it in December and wanted to make sure she got it before January 1. What is the name of the medication you are requesting? clinopen  2. What is the dose? 1mg   3. How do you take the medication? Orally, topically, etc? Orally   4. How often do you take this medication? Once daily   5. Do you need a 30 day or 90 day supply? 90 days  6. How many refills are you requesting?   7. What is your preferred pharmacy and location of the pharmacy?   8. Who can we contact with further questions? Pt     [id number] [place] - [address] [facility]  [address] [place] [address]  Phone: [phone] Fax: [phone]

## 2018-12-10 DIAGNOSIS — F41.1 ANXIETY STATE: ICD-10-CM

## 2018-12-10 RX ORDER — CLONAZEPAM 1 MG/1
TABLET ORAL
Qty: 90 TABLET | Refills: 0 | Status: SHIPPED | OUTPATIENT
Start: 2018-12-10 | End: 2019-03-08 | Stop reason: SDUPTHER

## 2018-12-12 ENCOUNTER — TELEPHONE (OUTPATIENT)
Dept: INTERNAL MEDICINE | Facility: CLINIC | Age: 68
End: 2018-12-12

## 2018-12-12 ENCOUNTER — OFFICE VISIT (OUTPATIENT)
Dept: INTERNAL MEDICINE | Facility: CLINIC | Age: 68
End: 2018-12-12
Payer: MEDICARE

## 2018-12-12 VITALS
OXYGEN SATURATION: 98 % | DIASTOLIC BLOOD PRESSURE: 88 MMHG | TEMPERATURE: 98 F | BODY MASS INDEX: 25.15 KG/M2 | SYSTOLIC BLOOD PRESSURE: 136 MMHG | WEIGHT: 136.69 LBS | RESPIRATION RATE: 16 BRPM | HEART RATE: 78 BPM | HEIGHT: 62 IN

## 2018-12-12 DIAGNOSIS — D84.9 IMMUNOSUPPRESSED STATUS: ICD-10-CM

## 2018-12-12 DIAGNOSIS — J06.9 VIRAL URI WITH COUGH: Primary | ICD-10-CM

## 2018-12-12 PROCEDURE — 99999 PR PBB SHADOW E&M-EST. PATIENT-LVL IV: CPT | Mod: PBBFAC,HCNC,, | Performed by: NURSE PRACTITIONER

## 2018-12-12 PROCEDURE — 96372 THER/PROPH/DIAG INJ SC/IM: CPT | Mod: HCNC,S$GLB,, | Performed by: NURSE PRACTITIONER

## 2018-12-12 PROCEDURE — 99214 OFFICE O/P EST MOD 30 MIN: CPT | Mod: 25,HCNC,S$GLB, | Performed by: NURSE PRACTITIONER

## 2018-12-12 PROCEDURE — 1101F PT FALLS ASSESS-DOCD LE1/YR: CPT | Mod: CPTII,HCNC,S$GLB, | Performed by: NURSE PRACTITIONER

## 2018-12-12 RX ORDER — PROMETHAZINE HYDROCHLORIDE AND DEXTROMETHORPHAN HYDROBROMIDE 6.25; 15 MG/5ML; MG/5ML
5 SYRUP ORAL NIGHTLY PRN
Qty: 120 ML | Refills: 0 | Status: SHIPPED | OUTPATIENT
Start: 2018-12-12 | End: 2018-12-19 | Stop reason: SDUPTHER

## 2018-12-12 RX ORDER — GUAIFENESIN 600 MG/1
1200 TABLET, EXTENDED RELEASE ORAL 2 TIMES DAILY
Qty: 40 TABLET | Refills: 0 | Status: SHIPPED | OUTPATIENT
Start: 2018-12-12 | End: 2018-12-12 | Stop reason: SDUPTHER

## 2018-12-12 RX ORDER — PROMETHAZINE HYDROCHLORIDE AND DEXTROMETHORPHAN HYDROBROMIDE 6.25; 15 MG/5ML; MG/5ML
5 SYRUP ORAL NIGHTLY PRN
Qty: 120 ML | Refills: 0 | Status: SHIPPED | OUTPATIENT
Start: 2018-12-12 | End: 2018-12-12 | Stop reason: SDUPTHER

## 2018-12-12 RX ORDER — GUAIFENESIN 600 MG/1
1200 TABLET, EXTENDED RELEASE ORAL 2 TIMES DAILY
Qty: 40 TABLET | Refills: 0 | Status: SHIPPED | OUTPATIENT
Start: 2018-12-12 | End: 2018-12-22

## 2018-12-12 RX ORDER — METHYLPREDNISOLONE ACETATE 80 MG/ML
80 INJECTION, SUSPENSION INTRA-ARTICULAR; INTRALESIONAL; INTRAMUSCULAR; SOFT TISSUE ONCE
Status: COMPLETED | OUTPATIENT
Start: 2018-12-12 | End: 2018-12-12

## 2018-12-12 RX ADMIN — METHYLPREDNISOLONE ACETATE 80 MG: 80 INJECTION, SUSPENSION INTRA-ARTICULAR; INTRALESIONAL; INTRAMUSCULAR; SOFT TISSUE at 04:12

## 2018-12-12 NOTE — PROGRESS NOTES
"Subjective:       Patient ID: Emmy Padilla is a 68 y.o. female.    Chief Complaint: URI    URI    This is a new problem. Episode onset: 2 days. The problem has been gradually worsening. There has been no fever. Associated symptoms include congestion, coughing, rhinorrhea, sneezing and a sore throat. Pertinent negatives include no abdominal pain, chest pain, diarrhea, dysuria, ear pain, headaches, joint pain, joint swelling, nausea, neck pain, plugged ear sensation, rash, sinus pain, swollen glands, vomiting or wheezing.       /88 (BP Location: Right arm, Patient Position: Sitting, BP Method: Medium (Automatic))   Pulse 78   Temp 98.1 °F (36.7 °C) (Oral)   Resp 16   Ht 5' 2" (1.575 m)   Wt 62 kg (136 lb 11 oz)   SpO2 98%   BMI 25.00 kg/m²     Review of Systems   Constitutional: Negative for activity change, appetite change, chills, diaphoresis, fatigue, fever and unexpected weight change.   HENT: Positive for congestion, postnasal drip, rhinorrhea, sinus pressure, sneezing and sore throat. Negative for dental problem, drooling, ear discharge, ear pain, hearing loss, mouth sores, nosebleeds, sinus pain, tinnitus, trouble swallowing and voice change.    Eyes: Negative for pain, discharge and redness.   Respiratory: Positive for cough. Negative for choking, chest tightness, shortness of breath, wheezing and stridor.    Cardiovascular: Negative for chest pain, palpitations and leg swelling.   Gastrointestinal: Negative for abdominal pain, diarrhea, nausea and vomiting.   Endocrine: Negative for cold intolerance and heat intolerance.   Genitourinary: Negative for dysuria.   Musculoskeletal: Negative for arthralgias, joint pain, joint swelling, myalgias and neck pain.   Skin: Negative for rash.   Allergic/Immunologic: Positive for immunocompromised state. Negative for environmental allergies and food allergies.   Neurological: Negative for syncope, light-headedness and headaches.       Objective:    "   Physical Exam   Constitutional: She is oriented to person, place, and time. She appears well-developed and well-nourished.   HENT:   Head: Normocephalic and atraumatic.   Right Ear: Tympanic membrane, external ear and ear canal normal.   Left Ear: Tympanic membrane, external ear and ear canal normal.   Nose: Mucosal edema and rhinorrhea present. Right sinus exhibits no maxillary sinus tenderness and no frontal sinus tenderness. Left sinus exhibits no maxillary sinus tenderness and no frontal sinus tenderness.   Mouth/Throat: Uvula is midline, oropharynx is clear and moist and mucous membranes are normal. No oropharyngeal exudate, posterior oropharyngeal edema or posterior oropharyngeal erythema.   Eyes: Conjunctivae are normal. Right eye exhibits no discharge. Left eye exhibits no discharge.   Neck: Normal range of motion.   Cardiovascular: Normal rate, regular rhythm and normal heart sounds.   No murmur heard.  Pulmonary/Chest: Effort normal and breath sounds normal. No accessory muscle usage. No respiratory distress. She has no decreased breath sounds. She has no wheezes. She has no rhonchi. She has no rales. She exhibits no tenderness.   Moist cough   Abdominal: Soft. She exhibits no distension.   Musculoskeletal: Normal range of motion.   Neurological: She is alert and oriented to person, place, and time.   Skin: Skin is warm and dry. She is not diaphoretic.   Psychiatric: She has a normal mood and affect. Her behavior is normal.   Nursing note and vitals reviewed.      Assessment:       1. Viral URI with cough    2. Immunosuppressed status        Plan:       Emmy was seen today for uri.    Diagnoses and all orders for this visit:    Viral URI with cough  -     methylPREDNISolone acetate injection 80 mg  -     guaiFENesin (MUCINEX) 600 mg 12 hr tablet; Take 2 tablets (1,200 mg total) by mouth 2 (two) times daily. for 10 days  -     promethazine-dextromethorphan (PROMETHAZINE-DM) 6.25-15 mg/5 mL Syrp; Take 5  mLs by mouth nightly as needed (Cough).    Immunosuppressed status    Patient requests steroid shot. This worked very well for her last time.  Rest  Drink plenty of clear fluids  Nasal saline spray to clear nasal drainage and help with nasal congestion  Zyrtec or Claritin to help dry mucus and post nasal drip  Mucinex or Mucinex DM for cough and chest congestion  Tylenol or Ibuprofen for fever, headache and body aches  Warm salt water gargles for throat comfort  Chloraseptic spray or lozenges for throat comfort  See Primary Care Physician or go to ER if symptoms worsen of fail to improve with treatment.      If not improving by Friday Patient will call me for possible antibiotics due to immunocompromised state but this seems viral at this time. Patient agrees with plan

## 2018-12-12 NOTE — PROGRESS NOTES
Administered Drepo-medrol 80mg IM to pt right ventrogluteal. Pt tolerated well. No distress noted. Advised pt to wait 20 minutes in lobby and to inform  if any adverse reaction occurs. Pt stated understanding.

## 2018-12-12 NOTE — PATIENT INSTRUCTIONS

## 2018-12-12 NOTE — TELEPHONE ENCOUNTER
----- Message from Nan Snider sent at 12/12/2018  8:47 AM CST -----  Contact: pt  Calling in regards to be worked into the schedule and to give her a call back because she has some concerns and questions. [phone] (home)

## 2018-12-12 NOTE — TELEPHONE ENCOUNTER
Pt complains of cough and congestion. Informed pt that Dr. Gutierrez doesn't have any appts available today, but I can schedule an appt with Maricruz Saldivar NP. She verbalized understanding. Appt scheduled with Maricruz at 4:20 today.

## 2018-12-14 ENCOUNTER — TELEPHONE (OUTPATIENT)
Dept: INTERNAL MEDICINE | Facility: CLINIC | Age: 68
End: 2018-12-14

## 2018-12-14 DIAGNOSIS — J22 BACTERIAL LOWER RESPIRATORY INFECTION: Primary | ICD-10-CM

## 2018-12-14 DIAGNOSIS — B96.89 BACTERIAL LOWER RESPIRATORY INFECTION: Primary | ICD-10-CM

## 2018-12-14 RX ORDER — BENZONATATE 200 MG/1
200 CAPSULE ORAL 3 TIMES DAILY PRN
Qty: 30 CAPSULE | Refills: 0 | Status: SHIPPED | OUTPATIENT
Start: 2018-12-14 | End: 2018-12-24

## 2018-12-14 RX ORDER — DOXYCYCLINE 100 MG/1
100 CAPSULE ORAL EVERY 12 HOURS
Qty: 20 CAPSULE | Refills: 0 | Status: SHIPPED | OUTPATIENT
Start: 2018-12-14 | End: 2018-12-24

## 2018-12-14 NOTE — TELEPHONE ENCOUNTER
Spoke to pt. Pt mentioned she was told to call if not better in regards to an antibiotic being called in. She also asked if she could either take the cough syrup during the day as well as nightly or if she can have something called in for cough for during the day.

## 2018-12-14 NOTE — TELEPHONE ENCOUNTER
Doxy and tessalon perles sent in. Take perles during the day for cough and syrup pat night. Antibiotics sent per request due to immunocompromised state

## 2018-12-14 NOTE — TELEPHONE ENCOUNTER
----- Message from Andrea Hendricks sent at 12/14/2018  8:27 AM CST -----  Contact: pt   Pt states still not feeling better and  Told her to call back and ask for antibotics, pls return call.           ..[phone]

## 2018-12-17 ENCOUNTER — TELEPHONE (OUTPATIENT)
Dept: INTERNAL MEDICINE | Facility: CLINIC | Age: 68
End: 2018-12-17

## 2018-12-17 NOTE — TELEPHONE ENCOUNTER
Spoke to patient. She stated she is starting to feel better. Stated that she since has broken her fever. Informed her that she may go back to work if she feels that she can and fever free for 24 hours, per provider. Wants to return to work tomorrow. Requesting a work excuse for Thurs, Fri, and today. Okay to excuse her for all 3 days and to return tomorrow?

## 2018-12-17 NOTE — TELEPHONE ENCOUNTER
----- Message from Juanjo Toro sent at 12/17/2018  7:32 AM CST -----  Contact: self [phone]  Would like to consult with nurse regarding return to work;would like to know it it is okay to return work.  Md Charla

## 2018-12-19 ENCOUNTER — TELEPHONE (OUTPATIENT)
Dept: INTERNAL MEDICINE | Facility: CLINIC | Age: 68
End: 2018-12-19

## 2018-12-19 ENCOUNTER — HOSPITAL ENCOUNTER (OUTPATIENT)
Dept: RADIOLOGY | Facility: HOSPITAL | Age: 68
Discharge: HOME | End: 2018-12-19
Attending: NURSE PRACTITIONER
Payer: MEDICARE

## 2018-12-19 ENCOUNTER — OFFICE VISIT (OUTPATIENT)
Dept: URGENT CARE | Facility: CLINIC | Age: 68
End: 2018-12-19
Payer: MEDICARE

## 2018-12-19 VITALS
RESPIRATION RATE: 17 BRPM | SYSTOLIC BLOOD PRESSURE: 122 MMHG | DIASTOLIC BLOOD PRESSURE: 80 MMHG | TEMPERATURE: 98 F | OXYGEN SATURATION: 96 % | HEART RATE: 98 BPM | BODY MASS INDEX: 24.88 KG/M2 | WEIGHT: 136 LBS

## 2018-12-19 DIAGNOSIS — B96.89 BACTERIAL LOWER RESPIRATORY INFECTION: ICD-10-CM

## 2018-12-19 DIAGNOSIS — R06.2 WHEEZING ON EXPIRATION: ICD-10-CM

## 2018-12-19 DIAGNOSIS — J06.9 VIRAL URI WITH COUGH: ICD-10-CM

## 2018-12-19 DIAGNOSIS — B96.89 BACTERIAL LOWER RESPIRATORY INFECTION: Primary | ICD-10-CM

## 2018-12-19 DIAGNOSIS — J22 BACTERIAL LOWER RESPIRATORY INFECTION: ICD-10-CM

## 2018-12-19 DIAGNOSIS — J22 BACTERIAL LOWER RESPIRATORY INFECTION: Primary | ICD-10-CM

## 2018-12-19 PROCEDURE — 71046 X-RAY EXAM CHEST 2 VIEWS: CPT | Mod: 26,HCNC,, | Performed by: RADIOLOGY

## 2018-12-19 PROCEDURE — 94640 AIRWAY INHALATION TREATMENT: CPT | Mod: HCNC,S$GLB,, | Performed by: NURSE PRACTITIONER

## 2018-12-19 PROCEDURE — 99999 PR PBB SHADOW E&M-EST. PATIENT-LVL III: CPT | Mod: PBBFAC,HCNC,, | Performed by: NURSE PRACTITIONER

## 2018-12-19 PROCEDURE — 1101F PT FALLS ASSESS-DOCD LE1/YR: CPT | Mod: CPTII,HCNC,S$GLB, | Performed by: NURSE PRACTITIONER

## 2018-12-19 PROCEDURE — 71046 X-RAY EXAM CHEST 2 VIEWS: CPT | Mod: TC,FY,HCNC,PO

## 2018-12-19 PROCEDURE — 99214 OFFICE O/P EST MOD 30 MIN: CPT | Mod: HCNC,25,S$GLB, | Performed by: NURSE PRACTITIONER

## 2018-12-19 RX ORDER — PROMETHAZINE HYDROCHLORIDE AND DEXTROMETHORPHAN HYDROBROMIDE 6.25; 15 MG/5ML; MG/5ML
5 SYRUP ORAL NIGHTLY PRN
Qty: 120 ML | Refills: 0 | Status: SHIPPED | OUTPATIENT
Start: 2018-12-19 | End: 2019-12-02

## 2018-12-19 RX ORDER — IPRATROPIUM BROMIDE AND ALBUTEROL SULFATE 2.5; .5 MG/3ML; MG/3ML
3 SOLUTION RESPIRATORY (INHALATION)
Status: COMPLETED | OUTPATIENT
Start: 2018-12-19 | End: 2018-12-19

## 2018-12-19 RX ADMIN — IPRATROPIUM BROMIDE AND ALBUTEROL SULFATE 3 ML: 2.5; .5 SOLUTION RESPIRATORY (INHALATION) at 04:12

## 2018-12-19 NOTE — PATIENT INSTRUCTIONS
Preventing Common Respiratory Infections  Respiratory infections such as colds and influenza (the flu) are common in winter. These infections are often caused by viruses. They may share some symptoms, but not all respiratory infections are the same. Some make you more sick than others. You can take steps to prevent common respiratory infections. And if you get sick, you can take care of yourself to keep the infection from getting worse.    What is a cold?  · Symptoms include runny nose, coughing and sneezing, and sore throat. Cold symptoms tend to be milder than flu symptoms.  · Symptoms tend to come on slowly. They last for a few days to about a week.  · With a cold, you can still do most of the things you usually do.  What is the flu?  · Symptoms include fever, headache, fatigue, cough, sore throat, runny nose, and muscle aches. Children may have upset stomach and vomiting, but adults usually dont.  · Symptoms tend to come on quickly. Some, such as fatigue and cough, can last a few weeks.  · With the flu, you may feel worn out and not able to do normal activities.  · Its most likely NOT the flu if an adult has vomiting or diarrhea for a day or two. This so-called stomach flu is probably a GI (gastrointestinal) infection.  When the infection gets worse  Without proper care, a respiratory infection can get worse. It can lead to serious complications and death. If you arent getting better, call your healthcare provider. Complications can include:  · Bronchitis (infection of the airways that leads to shortness of breath and coughing up thick yellow or green mucus)  · Pneumonia (infection of the lungs in which fluid and mucus settle in the lungs, making breathing difficult)  · Worsening of chronic conditions such as heart failure, chronic lung disease, asthma, or diabetes  · Severe dehydration (loss of fluids)  · Sinus problems  · Ear infections   Get a flu vaccine  A flu vaccine protects you from influenza  (but not other colds or infections). Get a vaccine each fall, before flu season starts. This can be done at a clinic, healthcare providers office, drugstore, senior center, or through your workplace.  Get pneumococcal vaccines  Pneumonia can be a complication of influenza. There are 2 pneumococcal pneumonia vaccines that protect against many types of pneumonia. Talk with your healthcare provider about these important vaccines.   Keep germs from spreading  No one likes getting sick. To protect yourself and others from cold and flu germs:  · Wash your hands often. Use alcohol-based hand  when you dont have access to soap and water.  · Dont touch your eyes, nose, and mouth. This may help you keep germs out of your body.  · Try to avoid people with respiratory infections. You may want to stay out of crowds during flu season (winter).  · Ask your healthcare provider if you should get a pneumonia vaccination.  How to wash your hands  · Use warm water and plenty of soap. Work up a good lather.  · Clean your whole hand, under your nails, between your fingers, and up your wrists. Wash for at least 15 to 20 seconds. Dont just wipe--rub well.  · Rinse. Let the water run down your fingertips, not up your wrists.  · In a public restroom, use a paper towel to turn off the faucet and open the door.   Date Last Reviewed: 12/1/2016  © 3784-7993 [employer]. [address] [place] [address], [place] [address]. All rights reserved. This information is not intended as a substitute for professional medical care. Always follow your healthcare professional's instructions.

## 2018-12-19 NOTE — TELEPHONE ENCOUNTER
----- Message from Kelly Morales sent at 12/19/2018  9:04 AM CST -----  Contact: pt  States she's returning a call. Please call pt at [phone]. Thank you

## 2018-12-19 NOTE — PROGRESS NOTES
Subjective:      Patient ID: Emmy Padilla is a 68 y.o. female.    Chief Complaint: Cough and Nasal Congestion      Cough   This is a new problem. The current episode started in the past 7 days. The problem has been gradually improving. The problem occurs every few minutes. The cough is productive of sputum (green/clear thick). Associated symptoms include nasal congestion and wheezing. Pertinent negatives include no chest pain, chills, ear congestion, ear pain, fever, headaches, heartburn, hemoptysis, myalgias, postnasal drip, rash, rhinorrhea, sore throat, shortness of breath, sweats or weight loss. Nothing aggravates the symptoms. She has tried prescription cough suppressant (steriod injection) for the symptoms. The treatment provided mild relief. Her past medical history is significant for bronchitis. There is no history of asthma, COPD, emphysema, environmental allergies or pneumonia.     Review of Systems   Constitutional: Negative for chills, fever and weight loss.   HENT: Negative for ear pain, postnasal drip, rhinorrhea and sore throat.    Respiratory: Positive for cough and wheezing. Negative for hemoptysis and shortness of breath.    Cardiovascular: Negative for chest pain.   Gastrointestinal: Negative for heartburn.   Musculoskeletal: Negative for myalgias.   Skin: Negative for rash.   Allergic/Immunologic: Negative for environmental allergies.   Neurological: Negative for headaches.        Objective:     Vitals:    12/19/18 1600   BP: 122/80   Pulse: 98   Resp: 17   Temp: 98.2 °F (36.8 °C)     Physical Exam   Constitutional: She is oriented to person, place, and time. Vital signs are normal. She appears well-developed and well-nourished. She is cooperative. No distress.   HENT:   Head: Normocephalic.   Right Ear: Hearing, tympanic membrane, external ear and ear canal normal.   Left Ear: Hearing, tympanic membrane, external ear and ear canal normal.   Nose: Nose normal.   Mouth/Throat: Uvula is  midline, oropharynx is clear and moist and mucous membranes are normal. No oral lesions. No uvula swelling. No oropharyngeal exudate, posterior oropharyngeal edema, posterior oropharyngeal erythema or tonsillar abscesses.   Eyes: Conjunctivae, EOM and lids are normal. Pupils are equal, round, and reactive to light. Right eye exhibits no discharge. Left eye exhibits no discharge.   Neck: Normal range of motion and full passive range of motion without pain. Neck supple.   Cardiovascular: Normal rate, regular rhythm, S1 normal, S2 normal and normal heart sounds.   Pulmonary/Chest: Effort normal. No accessory muscle usage or stridor. No tachypnea. No respiratory distress. She has no decreased breath sounds. She has wheezes (mild expiratory throughout). She has rhonchi (mild throughout). She has no rales. She exhibits no tenderness.   Rechecked lung sounds after duoneb, with decrease wheezing/rhonchi throughout   Lymphadenopathy:        Head (right side): No tonsillar adenopathy present.        Head (left side): No tonsillar adenopathy present.     She has no cervical adenopathy.   Neurological: She is alert and oriented to person, place, and time.   Skin: Skin is warm, dry and intact. Capillary refill takes less than 2 seconds. No rash noted.   Nursing note and vitals reviewed.      Assessment:     1. Bacterial lower respiratory infection    2. Wheezing on expiration        Plan:     Emmy was seen today for cough and nasal congestion.    Diagnoses and all orders for this visit:    Bacterial lower respiratory infection  -     X-Ray Chest PA And Lateral; Future    Wheezing on expiration  -     X-Ray Chest PA And Lateral; Future    Other orders  -     albuterol-ipratropium 2.5 mg-0.5 mg/3 mL nebulizer solution 3 mL    Antibiotic Therapy  Take antibiotics for entire course as previously prescribed and use of ventolin Inhaler as advised  Do not save medications for later, all medications must be taken for full  regimen.  Follow up with PCP or ER if symptoms do not improve or worsen.  Rest  Drink plenty of clear fluids  Mucinex or Mucinex DM for cough and chest congestion        BEBE Jeff, FNP-C

## 2018-12-19 NOTE — TELEPHONE ENCOUNTER
----- Message from Raya Delgado sent at 12/19/2018  4:10 PM CST -----  Patient stopped in clinic this afternoon stating she had not received a call back from Dept regarding a previous message. Has questions about her current medication that was prescribed. Was told NOT to take cough medication with DM, however the cough medication that was prescribed has DM labled behind it. Stated she feels a little better but not completely. Feels she may be over medicated.  called to speak with nurse reji gonzalez, both were unavailable. Stated feels she may need a breathing treatment. Patient checked in to be seen in urgent care this afternoon. marion

## 2018-12-19 NOTE — TELEPHONE ENCOUNTER
Cough syrup refilled. Use albuterol as needed. Drink lots of fluids. Use plain mucinex to break up chest congestion.

## 2018-12-19 NOTE — TELEPHONE ENCOUNTER
Spoke to patient. Pt stated she was calling to mention that she is starting to feel better but still has a cough and is wheezing at times. She mentioned that she thinks she needs to take the cough syrup, which she is almost out of, throughout the day as well as night.

## 2018-12-20 ENCOUNTER — TELEPHONE (OUTPATIENT)
Dept: URGENT CARE | Facility: CLINIC | Age: 68
End: 2018-12-20

## 2018-12-20 NOTE — TELEPHONE ENCOUNTER
Spoke with patient by phone after 2 patient identifiers and notified of chest x-ray results from yesterday, who was receptive, advised to follow up with PCP for any changes in condition or any other concerns, who reports understanding

## 2019-01-05 ENCOUNTER — OFFICE VISIT (OUTPATIENT)
Dept: URGENT CARE | Facility: CLINIC | Age: 69
End: 2019-01-05
Payer: MEDICARE

## 2019-01-05 VITALS
TEMPERATURE: 98 F | SYSTOLIC BLOOD PRESSURE: 128 MMHG | HEIGHT: 62 IN | DIASTOLIC BLOOD PRESSURE: 74 MMHG | OXYGEN SATURATION: 98 % | BODY MASS INDEX: 24.83 KG/M2 | HEART RATE: 82 BPM | WEIGHT: 134.94 LBS

## 2019-01-05 DIAGNOSIS — R30.0 DYSURIA: ICD-10-CM

## 2019-01-05 DIAGNOSIS — R82.90 ABNORMAL FINDING ON URINALYSIS: Primary | ICD-10-CM

## 2019-01-05 DIAGNOSIS — R05.9 COUGH: ICD-10-CM

## 2019-01-05 LAB
BILIRUB SERPL-MCNC: NEGATIVE MG/DL
BLOOD URINE, POC: 250
COLOR, POC UA: YELLOW
GLUCOSE UR QL STRIP: NORMAL
KETONES UR QL STRIP: NEGATIVE
LEUKOCYTE ESTERASE URINE, POC: 2
NITRITE, POC UA: NEGATIVE
PH, POC UA: 7
PROTEIN, POC: ABNORMAL
SPECIFIC GRAVITY, POC UA: 1
UROBILINOGEN, POC UA: NORMAL

## 2019-01-05 PROCEDURE — 87088 URINE BACTERIA CULTURE: CPT | Mod: HCNC

## 2019-01-05 PROCEDURE — 1101F PR PT FALLS ASSESS DOC 0-1 FALLS W/OUT INJ PAST YR: ICD-10-PCS | Mod: CPTII,HCNC,S$GLB, | Performed by: NURSE PRACTITIONER

## 2019-01-05 PROCEDURE — 99999 PR PBB SHADOW E&M-EST. PATIENT-LVL IV: CPT | Mod: PBBFAC,HCNC,, | Performed by: NURSE PRACTITIONER

## 2019-01-05 PROCEDURE — 99214 OFFICE O/P EST MOD 30 MIN: CPT | Mod: HCNC,25,S$GLB, | Performed by: NURSE PRACTITIONER

## 2019-01-05 PROCEDURE — 1101F PT FALLS ASSESS-DOCD LE1/YR: CPT | Mod: CPTII,HCNC,S$GLB, | Performed by: NURSE PRACTITIONER

## 2019-01-05 PROCEDURE — 87086 URINE CULTURE/COLONY COUNT: CPT | Mod: HCNC

## 2019-01-05 PROCEDURE — 99214 PR OFFICE/OUTPT VISIT, EST, LEVL IV, 30-39 MIN: ICD-10-PCS | Mod: HCNC,25,S$GLB, | Performed by: NURSE PRACTITIONER

## 2019-01-05 PROCEDURE — 87186 SC STD MICRODIL/AGAR DIL: CPT | Mod: HCNC

## 2019-01-05 PROCEDURE — 81001 URINALYSIS AUTO W/SCOPE: CPT | Mod: HCNC,S$GLB,, | Performed by: NURSE PRACTITIONER

## 2019-01-05 PROCEDURE — 99999 PR PBB SHADOW E&M-EST. PATIENT-LVL IV: ICD-10-PCS | Mod: PBBFAC,HCNC,, | Performed by: NURSE PRACTITIONER

## 2019-01-05 PROCEDURE — 81001 POCT URINALYSIS, DIPSTICK OR TABLET REAGENT, AUTOMATED, WITH MICROSCOP: ICD-10-PCS | Mod: HCNC,S$GLB,, | Performed by: NURSE PRACTITIONER

## 2019-01-05 PROCEDURE — 87077 CULTURE AEROBIC IDENTIFY: CPT | Mod: HCNC

## 2019-01-05 RX ORDER — LORATADINE 10 MG/1
10 TABLET ORAL DAILY
Qty: 30 TABLET | Refills: 0 | COMMUNITY
Start: 2019-01-05 | End: 2023-04-07

## 2019-01-05 RX ORDER — GUAIFENESIN 600 MG/1
600 TABLET, EXTENDED RELEASE ORAL 2 TIMES DAILY PRN
Qty: 14 TABLET | Refills: 0 | COMMUNITY
Start: 2019-01-05 | End: 2019-01-12

## 2019-01-05 RX ORDER — NITROFURANTOIN 25; 75 MG/1; MG/1
100 CAPSULE ORAL 2 TIMES DAILY
Qty: 14 CAPSULE | Refills: 0 | Status: SHIPPED | OUTPATIENT
Start: 2019-01-05 | End: 2019-01-10 | Stop reason: SDUPTHER

## 2019-01-05 NOTE — PATIENT INSTRUCTIONS
Understanding Urinary Tract Infections (UTIs)  Most UTIs are caused by bacteria, although they may also be caused by viruses or fungi. Bacteria from the bowel are the most common source of infection. The infection may start because of any of the following:  · Sexual activity. During sex, bacteria can travel from the penis, vagina, or rectum into the urethra.   · Bacteria on the skin outside the rectum may travel into the urethra. This is more common in women since the rectum and urethra are closer to each other than in men. Wiping from front to back after using the toilet and keeping the area clean can help prevent germs from getting to the urethra.  · Blockage of urine flow through the urinary tract. If urine sits too long, germs may start to grow out of control.      Parts of the urinary tract  The infection can occur in any part of the urinary tract.  · The kidneys collect and store urine.  · The ureters carry urine from the kidneys to the bladder.  · The bladder holds urine until you are ready to let it out.  · The urethra carries urine from the bladder out of the body. It is shorter in women, so bacteria can move through it more easily. The urethra is longer in men, so a UTI is less likely to reach the bladder or kidneys in men.  Date Last Reviewed: 1/1/2017  © 1781-1299 [employer]. [address] [place] [address], [place] [address]. All rights reserved. This information is not intended as a substitute for professional medical care. Always follow your healthcare professional's instructions.          Cough, Chronic, Uncertain Cause (Adult)    Everyone has had a cough as part of the common cold, flu, or bronchitis. This kind of cough occurs along with an achy feeling, low-grade fever, nasal and sinus congestion, and a scratchy or sore throat. This usually gets better in 2 to 3 weeks. A cough that lasts longer than 3 weeks may be due to other causes.  If your cough does not improve over the next 2 weeks,  further testing may be needed. Follow up with your healthcare provider as advised. Cough suppressants may be recommended. Based on your exam today, the exact cause of your cough is not certain. Below are some common causes for persistent cough.  Smokers cough  Smokers cough doesnt go away. If you continue to smoke, it only gets worse. The cough is from irritation in the air passages. Talk to your healthcare provider about quitting. Medicines or nicotine-replacement products, like gum or the patch, may make quitting easier.  Postnasal drip  A cough that is worse at night may be due to postnasal drip. Excess mucus in the nose drains from the back of your nose to your throat. This triggers the cough reflex. Postnasal drip may be due to a sinus infection or allergy. Common allergens include dust, tobacco smoke (both inhaled and secondhand smoke), environmental pollutants, pollen, mold, pets, cleaning agents, room deodorizers, and chemical fumes. Over-the-counter antihistamines or decongestants may be helpful for allergies. A sinus infection may requires antibiotic treatment. See your healthcare provider if symptoms continue.  Medicines  Certain prescribed medicines can cause a chronic cough in some people:  · ACE inhibitors for high blood pressure. These include benazepril, captopril, enalapril, fosinopril, lisinopril, quinapril, ramipril, and others.  · Beta-blockers for high blood pressure and other conditions. These include propranolol, atenolol, metoprolol, nadolol, and others.  Let your healthcare provider know if you are taking any of these.  Asthma  Cough may be the only sign of mild asthma. You may have tests to find out if asthma is causing your cough. You may also take asthma medicine on a trial basis.  Acid reflux (heartburn, GERD)  The esophagus is the tube that carries food from the mouth to the stomach. A valve at its lower end prevents stomach acids from flowing upward. If this valve does not work  properly, acid from the stomach enters the esophagus. This may cause a burning pain in the upper abdomen or lower chest, belching, or cough. Symptoms are often worse when lying flat. Avoid eating or drinking before bedtime. Try using extra pillows to raise your upper body, or place 4-inch blocks under the head of your bed. You may try an over-the-counter antacid or an acid-blocking medicine such as famotidine, cimetidine, ranitidine, esomeprazole, lansoprazole, or omeprazole. Stronger medicines for this condition can be prescribed by your healthcare provider.  Follow-up care  Follow up with your healthcare provider, or as advised, if your cough does not improve. Further testing may be needed.  Note: If an X-ray was taken, a specialist will review it. You will be notified of any new findings that may affect your care.  When to seek medical advice  Call your healthcare provider right away if any of these occur:  · Mild wheezing or difficulty breathing  · Fever of 100.4ºF (38ºC) or higher, or as directed by your healthcare provider  · Unexpected weight loss  · Coughing up large amounts of colored sputum  · Night sweats (sheets and pajamas get soaking wet)  Call 911, or get immediate medical care  Contact emergency services right away if any of these occur:  · Coughing up blood  · Moderate to severe trouble breathing or wheezing  Date Last Reviewed: 9/13/2015  © 3347-1076 [employer]. [address] [place] [address]. All rights reserved. This information is not intended as a substitute for professional medical care. Always follow your healthcare professional's instructions.          Acute Sinusitis    Acute sinusitis is irritation and swelling of the sinuses. It is usually caused by a viral infection after a common cold. Your doctor can help you find relief.  What is acute sinusitis?  Sinuses are air-filled spaces in the skull behind the face. They are kept moist and clean by a lining of mucosa.  Things such as pollen, smoke, and chemical fumes can irritate the mucosa. It can then swell up. As a response to irritation, the mucosa makes more mucus and other fluids. Tiny hairlike cilia cover the mucosa. Cilia help carry mucus toward the opening of the sinus. Too much mucus may cause the cilia to stop working. This blocks the sinus opening. A buildup of fluid in the sinuses then causes pain and pressure. It can also encourage bacteria to grow in the sinuses.  Common symptoms of acute sinusitis  You may have:  · Facial soreness pain  · Headache  · Fever  · Fluid draining in the back of the throat (postnasal drip)  · Congestion  · Drainage that is thick and colored, instead of clear  · Cough  Diagnosing acute sinusitis  Your doctor will ask about your symptoms and health history. He or she will look at your ear, nose, and throat. You usually won't need to have X-rays taken.    The doctor may take a sample of mucus to check for bacteria. If you have sinusitis that keeps coming back, you may need imaging tests such as X-rays or CAT scans. This will help your doctor check for a structural problem that may be causing the infection.  Treating acute sinusitis  Treatment is aimed at unblocking the sinus opening and helping the cilia work again. You may need to take antihistamine and decongestant medicine. These can reduce inflammation and decrease the amount of fluid your sinuses make. If you have a bacterial infection, you will need to take antibiotic medicine for 10 to 14 days. Take this medicine until it is gone, even if you feel better.  Date Last Reviewed: 10/1/2016  © 3746-0154 [employer]. [address] [place] [address], [place] [address]. All rights reserved. This information is not intended as a substitute for professional medical care. Always follow your healthcare professional's instructions.

## 2019-01-05 NOTE — PROGRESS NOTES
Subjective:      Patient ID: Emmy Padilla is a 68 y.o. female.    Chief Complaint: Urinary Tract Infection and Chest Congestion      Dysuria    This is a new problem. The current episode started in the past 7 days. The problem occurs intermittently. The problem has been waxing and waning. The quality of the pain is described as burning. The pain is at a severity of 2/10. The pain is mild. There has been no fever. She is not sexually active. There is no history of pyelonephritis. Associated symptoms include frequency and urgency. Pertinent negatives include no behavior changes, chills, discharge, flank pain, hematuria, hesitancy, nausea, possible pregnancy, sweats, vomiting, weight loss, bubble bath use, constipation, rash or withholding. She has tried increased fluids for the symptoms. The treatment provided moderate relief. Her past medical history is significant for catheterization. There is no history of diabetes insipidus, diabetes mellitus, genitourinary reflux, hypertension, kidney stones, recurrent UTIs, a single kidney, STD, urinary stasis or a urological procedure.   Cough   The current episode started 1 to 4 weeks ago. The problem has been rapidly improving. The problem occurs every few hours. The cough is productive of sputum. Associated symptoms include nasal congestion, postnasal drip, rhinorrhea, shortness of breath and wheezing. Pertinent negatives include no chest pain, chills, ear congestion, ear pain, fever, headaches, heartburn, hemoptysis, myalgias, rash, sore throat, sweats or weight loss. Exacerbated by: overheated. She has tried a beta-agonist inhaler (saline solution) for the symptoms. The treatment provided moderate relief. Her past medical history is significant for bronchitis. There is no history of asthma, COPD, emphysema, environmental allergies or pneumonia.     Review of Systems   Constitutional: Negative for chills, fever and weight loss.   HENT: Positive for postnasal drip  and rhinorrhea. Negative for ear pain and sore throat.    Respiratory: Positive for cough, shortness of breath and wheezing. Negative for hemoptysis.    Cardiovascular: Negative for chest pain.   Gastrointestinal: Negative for constipation, heartburn, nausea and vomiting.   Genitourinary: Positive for dysuria, frequency and urgency. Negative for flank pain, hematuria and hesitancy.   Musculoskeletal: Negative for myalgias.   Skin: Negative for rash.   Allergic/Immunologic: Negative for environmental allergies.   Neurological: Negative for headaches.        Objective:     Vitals:    01/05/19 0919   BP: 128/74   Pulse: 82   Temp: 98.2 °F (36.8 °C)     Physical Exam   Constitutional: She is oriented to person, place, and time. Vital signs are normal. She appears well-developed and well-nourished. She is cooperative. She does not appear ill. No distress.   HENT:   Head: Normocephalic.   Right Ear: Hearing, external ear and ear canal normal.   Left Ear: Hearing, external ear and ear canal normal.   Nose: Mucosal edema and rhinorrhea present.   Mouth/Throat: Uvula is midline, oropharynx is clear and moist and mucous membranes are normal. No oral lesions. No uvula swelling. No oropharyngeal exudate, posterior oropharyngeal edema or posterior oropharyngeal erythema.   TM Dull bilaterally   Eyes: Conjunctivae, EOM and lids are normal. Pupils are equal, round, and reactive to light. Right eye exhibits no discharge. Left eye exhibits no discharge.   Neck: Normal range of motion and full passive range of motion without pain. Neck supple.   Cardiovascular: Normal rate, regular rhythm, S1 normal, S2 normal and normal heart sounds.   Pulmonary/Chest: Effort normal and breath sounds normal. No accessory muscle usage. No tachypnea. No respiratory distress.   Abdominal: Soft. Normal appearance and bowel sounds are normal. She exhibits no distension and no mass. There is no tenderness. There is no rebound, no guarding, no tenderness  at McBurney's point and negative Packer's sign.   Genitourinary:   Genitourinary Comments: Abnormal In House Urinalysis: JOSE, BLO   Musculoskeletal: Normal range of motion.   Lymphadenopathy:        Head (right side): No tonsillar adenopathy present.        Head (left side): No tonsillar adenopathy present.     She has no cervical adenopathy.   Neurological: She is alert and oriented to person, place, and time.   Skin: Skin is warm, dry and intact. Capillary refill takes less than 2 seconds. No rash noted.   Nursing note and vitals reviewed.      Assessment:     1. Abnormal finding on urinalysis    2. Dysuria    3. Cough        Plan:     Emmy was seen today for urinary tract infection and chest congestion.    Diagnoses and all orders for this visit:    Abnormal finding on urinalysis    Dysuria    Cough    Other orders  -     guaiFENesin (MUCINEX) 600 mg 12 hr tablet; Take 1 tablet (600 mg total) by mouth 2 (two) times daily as needed.  -     loratadine (CLARITIN) 10 mg tablet; Take 1 tablet (10 mg total) by mouth once daily.  -     nitrofurantoin, macrocrystal-monohydrate, (MACROBID) 100 MG capsule; Take 1 capsule (100 mg total) by mouth 2 (two) times daily. for 7 days    Sinus/Allergy Symptoms    - Attempt to avoid allergens.  - Use Normal saline nasal spray to wash offending allergens from airways.  - Take antihistamine as prescribed such as Allegra, Zyrtec, Clartin.   - Take Plain Mucinex as directed.   - Drink plenty of fluids.              Albuterol Inhaler as advised             Go to ER immediately if severe allergic response experienced such as difficulty breathing, facial swelling, throat swelling.  · Increase fluids (avoid caffeine or sugary beverages as these will irritate the bladder).   · Take your antibiotics exactly as prescribed and make sure to complete entire course even if you begin to feel better. This will prevent recurrence of infection and antibiotic resistance.   · We have prescribed an  antibiotic that covers most bacteria that cause urinary tract infections, however, if your urine culture shows that you have any bacterial resistance to the antibiotic you were prescribed or an unusual bacterial strain, we will notify you and make any necessary changes. Urine cultures usually result in about three days.   · Please follow up with your primary care provider if your symptoms do not improve within 2-3 days or sooner for any new or worsening symptoms.  · For any severe pain, bright red blood in urine, difficulty urinating, fever that does not improve with Tylenol or Ibuprofen, inability to urinate, incontinence of urine or bowels, or for any other urgent concerns, please go to the ER for immediate evaluation.       BEBE Jeff, FNP-C

## 2019-01-07 LAB — BACTERIA UR CULT: NORMAL

## 2019-01-08 ENCOUNTER — TELEPHONE (OUTPATIENT)
Dept: URGENT CARE | Facility: CLINIC | Age: 69
End: 2019-01-08

## 2019-01-08 NOTE — TELEPHONE ENCOUNTER
----- Message from Michaela Snider sent at 1/8/2019 10:28 AM CST -----  Contact: self- [phone]  Would like to consult with the nurse, patients was seen on dec 5, 2019 , patients wanted to get her lab result, please call back at [phone] thanks sj

## 2019-01-09 RX ORDER — AMOXICILLIN 875 MG/1
875 TABLET, FILM COATED ORAL 2 TIMES DAILY
Qty: 20 TABLET | Refills: 0 | Status: SHIPPED | OUTPATIENT
Start: 2019-01-09 | End: 2019-01-19

## 2019-01-09 NOTE — TELEPHONE ENCOUNTER
----- Message from Alba Gregory sent at 1/9/2019  2:59 PM CST -----  Contact: pt   Pt was returning nurse call regarding her test results.    [phone] (home)

## 2019-01-09 NOTE — TELEPHONE ENCOUNTER
Informed patient of urine culture , Informed that amoxil was sent pharmacy bc patient states that macrobid upset her stomach, advised to dc macrobid and start and finish amoxil per Dr. Iniguez.

## 2019-02-07 RX ORDER — DULOXETIN HYDROCHLORIDE 60 MG/1
CAPSULE, DELAYED RELEASE ORAL
Qty: 90 CAPSULE | Refills: 2 | Status: SHIPPED | OUTPATIENT
Start: 2019-02-07 | End: 2019-11-03 | Stop reason: SDUPTHER

## 2019-03-08 DIAGNOSIS — F41.1 ANXIETY STATE: ICD-10-CM

## 2019-03-08 RX ORDER — CLONAZEPAM 1 MG/1
TABLET ORAL
Qty: 90 TABLET | Refills: 0 | Status: SHIPPED | OUTPATIENT
Start: 2019-03-08 | End: 2019-06-10 | Stop reason: SDUPTHER

## 2019-03-18 ENCOUNTER — PES CALL (OUTPATIENT)
Dept: ADMINISTRATIVE | Facility: CLINIC | Age: 69
End: 2019-03-18

## 2019-04-30 ENCOUNTER — PES CALL (OUTPATIENT)
Dept: ADMINISTRATIVE | Facility: CLINIC | Age: 69
End: 2019-04-30

## 2019-05-01 ENCOUNTER — PES CALL (OUTPATIENT)
Dept: ADMINISTRATIVE | Facility: CLINIC | Age: 69
End: 2019-05-01

## 2019-06-10 DIAGNOSIS — F41.1 ANXIETY STATE: ICD-10-CM

## 2019-06-11 RX ORDER — CLONAZEPAM 1 MG/1
TABLET ORAL
Qty: 90 TABLET | Refills: 0 | Status: SHIPPED | OUTPATIENT
Start: 2019-06-11 | End: 2019-09-07 | Stop reason: SDUPTHER

## 2019-09-07 DIAGNOSIS — F41.1 ANXIETY STATE: ICD-10-CM

## 2019-09-09 RX ORDER — CLONAZEPAM 1 MG/1
TABLET ORAL
Qty: 30 TABLET | Refills: 0 | Status: SHIPPED | OUTPATIENT
Start: 2019-09-09 | End: 2019-10-08 | Stop reason: SDUPTHER

## 2019-09-17 ENCOUNTER — OFFICE VISIT (OUTPATIENT)
Dept: INTERNAL MEDICINE | Facility: CLINIC | Age: 69
End: 2019-09-17
Payer: MEDICARE

## 2019-09-17 VITALS
TEMPERATURE: 97 F | HEART RATE: 70 BPM | WEIGHT: 128.75 LBS | BODY MASS INDEX: 23.69 KG/M2 | HEIGHT: 62 IN | DIASTOLIC BLOOD PRESSURE: 70 MMHG | SYSTOLIC BLOOD PRESSURE: 140 MMHG

## 2019-09-17 DIAGNOSIS — R30.0 DYSURIA: Primary | ICD-10-CM

## 2019-09-17 DIAGNOSIS — E78.5 DYSLIPIDEMIA: ICD-10-CM

## 2019-09-17 DIAGNOSIS — M06.9 RHEUMATOID ARTHRITIS, INVOLVING UNSPECIFIED SITE, UNSPECIFIED RHEUMATOID FACTOR PRESENCE: ICD-10-CM

## 2019-09-17 DIAGNOSIS — R73.09 ABNORMAL GLUCOSE: ICD-10-CM

## 2019-09-17 DIAGNOSIS — E55.9 VITAMIN D DEFICIENCY: ICD-10-CM

## 2019-09-17 DIAGNOSIS — R31.9 URINARY TRACT INFECTION WITH HEMATURIA, SITE UNSPECIFIED: ICD-10-CM

## 2019-09-17 DIAGNOSIS — D84.9 IMMUNOSUPPRESSED STATUS: ICD-10-CM

## 2019-09-17 DIAGNOSIS — N39.0 URINARY TRACT INFECTION WITH HEMATURIA, SITE UNSPECIFIED: ICD-10-CM

## 2019-09-17 DIAGNOSIS — M81.0 OSTEOPOROSIS, UNSPECIFIED OSTEOPOROSIS TYPE, UNSPECIFIED PATHOLOGICAL FRACTURE PRESENCE: ICD-10-CM

## 2019-09-17 LAB
BILIRUB SERPL-MCNC: ABNORMAL MG/DL
BLOOD URINE, POC: 50
COLOR, POC UA: YELLOW
GLUCOSE UR QL STRIP: NORMAL
KETONES UR QL STRIP: ABNORMAL
LEUKOCYTE ESTERASE URINE, POC: ABNORMAL
NITRITE, POC UA: ABNORMAL
PH, POC UA: 8
PROTEIN, POC: ABNORMAL
SPECIFIC GRAVITY, POC UA: 1.01
UROBILINOGEN, POC UA: NORMAL

## 2019-09-17 PROCEDURE — 99999 PR PBB SHADOW E&M-EST. PATIENT-LVL III: ICD-10-PCS | Mod: PBBFAC,HCNC,, | Performed by: FAMILY MEDICINE

## 2019-09-17 PROCEDURE — 81002 URINALYSIS NONAUTO W/O SCOPE: CPT | Mod: HCNC,S$GLB,, | Performed by: FAMILY MEDICINE

## 2019-09-17 PROCEDURE — 1101F PR PT FALLS ASSESS DOC 0-1 FALLS W/OUT INJ PAST YR: ICD-10-PCS | Mod: HCNC,CPTII,S$GLB, | Performed by: FAMILY MEDICINE

## 2019-09-17 PROCEDURE — 81002 POCT URINE DIPSTICK WITHOUT MICROSCOPE: ICD-10-PCS | Mod: HCNC,S$GLB,, | Performed by: FAMILY MEDICINE

## 2019-09-17 PROCEDURE — 99999 PR PBB SHADOW E&M-EST. PATIENT-LVL III: CPT | Mod: PBBFAC,HCNC,, | Performed by: FAMILY MEDICINE

## 2019-09-17 PROCEDURE — 1101F PT FALLS ASSESS-DOCD LE1/YR: CPT | Mod: HCNC,CPTII,S$GLB, | Performed by: FAMILY MEDICINE

## 2019-09-17 PROCEDURE — 99214 PR OFFICE/OUTPT VISIT, EST, LEVL IV, 30-39 MIN: ICD-10-PCS | Mod: 25,HCNC,S$GLB, | Performed by: FAMILY MEDICINE

## 2019-09-17 PROCEDURE — 87086 URINE CULTURE/COLONY COUNT: CPT | Mod: HCNC

## 2019-09-17 PROCEDURE — 99214 OFFICE O/P EST MOD 30 MIN: CPT | Mod: 25,HCNC,S$GLB, | Performed by: FAMILY MEDICINE

## 2019-09-17 RX ORDER — CIPROFLOXACIN 500 MG/1
500 TABLET ORAL 2 TIMES DAILY
Qty: 14 TABLET | Refills: 0 | Status: SHIPPED | OUTPATIENT
Start: 2019-09-17 | End: 2019-09-24

## 2019-09-17 RX ORDER — ALBUTEROL SULFATE 90 UG/1
2 AEROSOL, METERED RESPIRATORY (INHALATION) EVERY 4 HOURS PRN
Qty: 18 G | Refills: 0 | Status: SHIPPED | OUTPATIENT
Start: 2019-09-17 | End: 2021-11-03 | Stop reason: SDUPTHER

## 2019-09-17 RX ORDER — ALENDRONATE SODIUM 70 MG/1
70 TABLET ORAL WEEKLY
COMMUNITY
Start: 2019-02-04 | End: 2020-02-04

## 2019-09-17 RX ORDER — MONTELUKAST SODIUM 10 MG/1
10 TABLET ORAL NIGHTLY
Qty: 30 TABLET | Refills: 6 | Status: SHIPPED | OUTPATIENT
Start: 2019-09-17 | End: 2020-10-13 | Stop reason: SDUPTHER

## 2019-09-17 NOTE — TELEPHONE ENCOUNTER
----- Message from Alba Gregory sent at 9/17/2019 12:53 PM CDT -----  Contact: pt   Type:  RX Refill Request    Who Called: pt   Refill or New Rx: refill  RX Name and Strength: albuterol (PROVENTIL/VENTOLIN HFA) 90 mcg/actuation inhaler  How is the patient currently taking it? (ex. 1XDay):  Is this a 30 day or 90 day RX: 30  Preferred Pharmacy with phone number:   [facility]/pharmacy #[id number]  [place] - [address]  [address] [address] [facility] [place] [address]  Phone: [phone] Fax: [phone]  Local or Mail Order: local   Ordering Provider: Matt   Would the patient rather a call back or a response via [facility]? Call back   Best Call Back Number: [phone] (home)   Additional Information: pt states that she forgot to tell the doctor that she need a refill

## 2019-09-17 NOTE — PROGRESS NOTES
"Subjective:      Patient ID: Emmy Padlila is a 69 y.o. female.    Chief Complaint:   Dysuria    HPI  70 yo with RA/immunosuppressed here with c/o dysuria/hematuria past few days.  No fever/chills.  No N/V.   No abd/back pain, some bladder pressure.    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    PMR (polymyalgia rheumatica)     resolved    Rheumatoid arthritis 12/2014    Dr. Traore     Family History   Problem Relation Age of Onset    Cancer Father         Esophageal    Cancer Mother     Cancer Brother 60        colon CA    Hypertension Brother     Hyperlipidemia Brother     Cancer Maternal Grandmother 92        breast CA     Past Surgical History:   Procedure Laterality Date    CONDYLOMA EXCISION/FULGURATION  2003    Anal    HAND SURGERY Right     middle finger trigger and swaying     nodules removed from rectal area      RETINAL DETACHMENT SURGERY Right 8/2106    RETINAL DETACHMENT SURGERY       Social History     Tobacco Use    Smoking status: Current Every Day Smoker     Packs/day: 0.50     Types: Cigarettes    Smokeless tobacco: Never Used   Substance Use Topics    Alcohol use: No     Alcohol/week: 0.0 oz    Drug use: No       BP (!) 140/70 (BP Location: Left arm, Patient Position: Sitting, BP Method: Large (Manual))   Pulse 70   Temp 97.3 °F (36.3 °C) (Oral)   Ht 5' 2" (1.575 m)   Wt 58.4 kg (128 lb 12 oz)   BMI 23.55 kg/m²     Review of Systems   Constitutional: Negative for activity change, appetite change, chills, diaphoresis, fatigue, fever and unexpected weight change.   HENT: Negative for ear pain, hearing loss, postnasal drip, rhinorrhea and tinnitus.    Eyes: Negative for visual disturbance.   Respiratory: Negative for cough, shortness of breath and wheezing.  "   Cardiovascular: Negative for chest pain, palpitations and leg swelling.   Gastrointestinal: Negative for abdominal distention.   Genitourinary: Positive for dysuria and frequency. Negative for hematuria and urgency.   Musculoskeletal: Positive for arthralgias. Negative for back pain and joint swelling.   Neurological: Negative for weakness and headaches.   Hematological: Negative for adenopathy.   Psychiatric/Behavioral: Negative for confusion and decreased concentration.       Objective:     Physical Exam   Constitutional: She is oriented to person, place, and time. She appears well-developed and well-nourished. No distress.   HENT:   R TM with effusion//no erythema   Neck: Carotid bruit is not present.   Cardiovascular: Normal rate, regular rhythm and normal heart sounds.   Pulmonary/Chest: Effort normal and breath sounds normal. No respiratory distress. She has no wheezes. She has no rales.   Abdominal: Soft. Bowel sounds are normal. She exhibits no distension. There is tenderness.   Musculoskeletal: She exhibits no edema.   Neurological: She is alert and oriented to person, place, and time. No cranial nerve deficit.   Skin: Skin is warm and dry. No rash noted.   Psychiatric: She has a normal mood and affect. Her behavior is normal. Judgment and thought content normal.   Nursing note and vitals reviewed.      Lab Results   Component Value Date    WBC 6.16 02/28/2018    HGB 12.1 02/28/2018    HCT 38.0 02/28/2018     02/28/2018    CHOL 214 (H) 02/28/2018    TRIG 89 02/28/2018    HDL 52 02/28/2018    ALT 18 02/28/2018    AST 19 02/28/2018     02/28/2018    K 4.5 02/28/2018     02/28/2018    CREATININE 0.7 02/28/2018    BUN 19 02/28/2018    CO2 25 02/28/2018    TSH 0.904 02/28/2018    HGBA1C 5.7 01/19/2017       Assessment:     1. Dysuria    2. Urinary tract infection with hematuria, site unspecified    3. Immunosuppressed status    4. Rheumatoid arthritis, involving unspecified site, unspecified  rheumatoid factor presence    5. Osteoporosis, unspecified osteoporosis type, unspecified pathological fracture presence    6. Dyslipidemia    7. Vitamin D deficiency    8. Abnormal glucose         Plan:     Dysuria  -     POCT URINE DIPSTICK WITHOUT MICROSCOPE  -     Urine culture; Future; Expected date: 09/17/2019    Urinary tract infection with hematuria, site unspecified    Immunosuppressed status    Rheumatoid arthritis, involving unspecified site, unspecified rheumatoid factor presence    Osteoporosis, unspecified osteoporosis type, unspecified pathological fracture presence    Dyslipidemia  -     CBC auto differential; Future; Expected date: 11/17/2019  -     Comprehensive metabolic panel; Future; Expected date: 11/17/2019  -     Lipid panel; Future; Expected date: 11/17/2019  -     TSH; Future; Expected date: 11/17/2019    Vitamin D deficiency  -     Vitamin D; Future; Expected date: 11/17/2019    Abnormal glucose  -     CBC auto differential; Future; Expected date: 11/17/2019  -     Hemoglobin A1c; Future; Expected date: 11/17/2019    Other orders  -     ciprofloxacin HCl (CIPRO) 500 MG tablet; Take 1 tablet (500 mg total) by mouth 2 (two) times daily. for 7 days  Dispense: 14 tablet; Refill: 0  -     montelukast (SINGULAIR) 10 mg tablet; Take 1 tablet (10 mg total) by mouth every evening.  Dispense: 30 tablet; Refill: 6    Start cipro now, wait on Cx  Refill on singulair for allergies/sinus  Increase fluids  F/u with labs in 2 mos

## 2019-09-19 ENCOUNTER — TELEPHONE (OUTPATIENT)
Dept: INTERNAL MEDICINE | Facility: CLINIC | Age: 69
End: 2019-09-19

## 2019-09-19 LAB — BACTERIA UR CULT: NORMAL

## 2019-09-19 NOTE — TELEPHONE ENCOUNTER
----- Message from Daniel Gutierrez MD sent at 9/19/2019  6:58 AM CDT -----  Go ahead and complete Abx.  No bacterial growth

## 2019-09-20 ENCOUNTER — TELEPHONE (OUTPATIENT)
Dept: INTERNAL MEDICINE | Facility: CLINIC | Age: 69
End: 2019-09-20

## 2019-09-20 RX ORDER — FLUCONAZOLE 150 MG/1
150 TABLET ORAL DAILY
Qty: 2 TABLET | Refills: 0 | Status: SHIPPED | OUTPATIENT
Start: 2019-09-20 | End: 2019-09-21

## 2019-09-20 NOTE — TELEPHONE ENCOUNTER
----- Message from Hailee Galvan sent at 9/20/2019  1:16 PM CDT -----  Contact: Pt  PT is requesting call back in regards to questions about UTI.      Pls call back at [phone]

## 2019-09-20 NOTE — TELEPHONE ENCOUNTER
----- Message from Rox Marrero sent at 9/20/2019  7:37 AM CDT -----  .Type:  Patient Returning Call    Who Called:pt  Who Left Message for Patient:  Does the patient know what this is regarding?: possible yeast infection   Would the patient rather a call back or a response via [facility]? Call back   Best Call Back Number: [phone]  Additional Information: Pt is requesting a call from nurse to discuss she may have a yeast infection due to the medication she is taking.    .  [facility]/pharmacy #[id number] - [place] - [address] [facility]  [address]  [place] [address]  Phone: [phone] Fax: [phone]

## 2019-09-20 NOTE — TELEPHONE ENCOUNTER
Pt said, she thinks the antibiotic gave her a yeast infection. She has burning, itching and irritation to vaginal area to buttocks. She also said, it feels like her glands to groin area are large.

## 2019-09-20 NOTE — TELEPHONE ENCOUNTER
Called pt and she said that it's already been taken care of .  Dr. Gutierrez sent her in some diflucan for her yeast infection.

## 2019-09-23 ENCOUNTER — PATIENT OUTREACH (OUTPATIENT)
Dept: ADMINISTRATIVE | Facility: HOSPITAL | Age: 69
End: 2019-09-23

## 2019-10-08 DIAGNOSIS — F41.1 ANXIETY STATE: ICD-10-CM

## 2019-10-09 RX ORDER — CLONAZEPAM 1 MG/1
TABLET ORAL
Qty: 30 TABLET | Refills: 3 | Status: SHIPPED | OUTPATIENT
Start: 2019-10-09 | End: 2020-02-06

## 2019-10-22 ENCOUNTER — IMMUNIZATION (OUTPATIENT)
Dept: INTERNAL MEDICINE | Facility: CLINIC | Age: 69
End: 2019-10-22
Payer: MEDICARE

## 2019-10-22 PROCEDURE — 90662 IIV NO PRSV INCREASED AG IM: CPT | Mod: HCNC,S$GLB,, | Performed by: INTERNAL MEDICINE

## 2019-10-22 PROCEDURE — 90662 FLU VACCINE - HIGH DOSE (65+) PRESERVATIVE FREE IM: ICD-10-PCS | Mod: HCNC,S$GLB,, | Performed by: INTERNAL MEDICINE

## 2019-10-22 PROCEDURE — G0008 ADMIN INFLUENZA VIRUS VAC: HCPCS | Mod: HCNC,S$GLB,, | Performed by: INTERNAL MEDICINE

## 2019-10-22 PROCEDURE — G0008 FLU VACCINE - HIGH DOSE (65+) PRESERVATIVE FREE IM: ICD-10-PCS | Mod: HCNC,S$GLB,, | Performed by: INTERNAL MEDICINE

## 2019-11-03 RX ORDER — DULOXETIN HYDROCHLORIDE 60 MG/1
CAPSULE, DELAYED RELEASE ORAL
Qty: 90 CAPSULE | Refills: 2 | Status: SHIPPED | OUTPATIENT
Start: 2019-11-03 | End: 2020-07-29

## 2019-11-25 ENCOUNTER — TELEPHONE (OUTPATIENT)
Dept: INTERNAL MEDICINE | Facility: CLINIC | Age: 69
End: 2019-11-25

## 2019-11-25 NOTE — TELEPHONE ENCOUNTER
Called pt and she has sorethroat , cough and drainage.  She said she usually is told to use claritin, flonase, Mucinex DM and has a cough syrup.  She wants appt with Dr. Gutierrez or can she send in cough syrup.  I let her know that she needs to be evaluated first before Dr. Gutierrez would send anything in.  She is out of clinic today.  Can schedule with someone at another location as don't have any availabilities today or can go to UC.  I recommend seeing UC as they may test her for strep and she may need an abx.  She said she will go to UC.

## 2019-11-25 NOTE — TELEPHONE ENCOUNTER
----- Message from Justa Candelaria sent at 11/25/2019  8:29 AM CST -----  Contact: Pt  Pt asked that nurse return her call regarding a sooner appt

## 2019-11-26 ENCOUNTER — OFFICE VISIT (OUTPATIENT)
Dept: URGENT CARE | Facility: CLINIC | Age: 69
End: 2019-11-26
Payer: MEDICARE

## 2019-11-26 VITALS
WEIGHT: 127.19 LBS | HEART RATE: 95 BPM | OXYGEN SATURATION: 96 % | DIASTOLIC BLOOD PRESSURE: 73 MMHG | BODY MASS INDEX: 23.4 KG/M2 | TEMPERATURE: 98 F | SYSTOLIC BLOOD PRESSURE: 125 MMHG | HEIGHT: 62 IN

## 2019-11-26 DIAGNOSIS — R06.02 SHORTNESS OF BREATH: ICD-10-CM

## 2019-11-26 DIAGNOSIS — J20.9 ACUTE BRONCHITIS, UNSPECIFIED ORGANISM: Primary | ICD-10-CM

## 2019-11-26 DIAGNOSIS — R05.9 COUGH: ICD-10-CM

## 2019-11-26 PROCEDURE — 99214 PR OFFICE/OUTPT VISIT, EST, LEVL IV, 30-39 MIN: ICD-10-PCS | Mod: 25,S$GLB,, | Performed by: NURSE PRACTITIONER

## 2019-11-26 PROCEDURE — 1101F PR PT FALLS ASSESS DOC 0-1 FALLS W/OUT INJ PAST YR: ICD-10-PCS | Mod: CPTII,S$GLB,, | Performed by: NURSE PRACTITIONER

## 2019-11-26 PROCEDURE — 96372 THER/PROPH/DIAG INJ SC/IM: CPT | Mod: S$GLB,,, | Performed by: NURSE PRACTITIONER

## 2019-11-26 PROCEDURE — 1159F PR MEDICATION LIST DOCUMENTED IN MEDICAL RECORD: ICD-10-PCS | Mod: S$GLB,,, | Performed by: NURSE PRACTITIONER

## 2019-11-26 PROCEDURE — 99214 OFFICE O/P EST MOD 30 MIN: CPT | Mod: 25,S$GLB,, | Performed by: NURSE PRACTITIONER

## 2019-11-26 PROCEDURE — 96372 PR INJECTION,THERAP/PROPH/DIAG2ST, IM OR SUBCUT: ICD-10-PCS | Mod: S$GLB,,, | Performed by: NURSE PRACTITIONER

## 2019-11-26 PROCEDURE — 1159F MED LIST DOCD IN RCRD: CPT | Mod: S$GLB,,, | Performed by: NURSE PRACTITIONER

## 2019-11-26 PROCEDURE — 1101F PT FALLS ASSESS-DOCD LE1/YR: CPT | Mod: CPTII,S$GLB,, | Performed by: NURSE PRACTITIONER

## 2019-11-26 RX ORDER — BETAMETHASONE SODIUM PHOSPHATE AND BETAMETHASONE ACETATE 3; 3 MG/ML; MG/ML
6 INJECTION, SUSPENSION INTRA-ARTICULAR; INTRALESIONAL; INTRAMUSCULAR; SOFT TISSUE
Status: COMPLETED | OUTPATIENT
Start: 2019-11-26 | End: 2019-11-26

## 2019-11-26 RX ORDER — ALBUTEROL SULFATE 90 UG/1
1-2 AEROSOL, METERED RESPIRATORY (INHALATION) EVERY 4 HOURS PRN
Qty: 1 INHALER | Refills: 0 | Status: SHIPPED | OUTPATIENT
Start: 2019-11-26 | End: 2019-12-26

## 2019-11-26 RX ORDER — PROMETHAZINE HYDROCHLORIDE AND DEXTROMETHORPHAN HYDROBROMIDE 6.25; 15 MG/5ML; MG/5ML
5 SYRUP ORAL EVERY 6 HOURS PRN
Qty: 118 ML | Refills: 0 | Status: SHIPPED | OUTPATIENT
Start: 2019-11-26 | End: 2019-12-02 | Stop reason: SDUPTHER

## 2019-11-26 RX ADMIN — BETAMETHASONE SODIUM PHOSPHATE AND BETAMETHASONE ACETATE 6 MG: 3; 3 INJECTION, SUSPENSION INTRA-ARTICULAR; INTRALESIONAL; INTRAMUSCULAR; SOFT TISSUE at 11:11

## 2019-11-26 NOTE — PROGRESS NOTES
"Subjective:       Patient ID: Emmy Padilla is a 69 y.o. female.    Vitals:  height is 5' 2" (1.575 m) and weight is 57.7 kg (127 lb 3.3 oz). Her oral temperature is 98 °F (36.7 °C). Her blood pressure is 125/73 and her pulse is 95. Her oxygen saturation is 96%.     Chief Complaint: URI    URI    This is a new problem. The current episode started in the past 7 days (3 days). The problem has been gradually worsening. There has been no fever. Associated symptoms include congestion, coughing, a plugged ear sensation, rhinorrhea, sinus pain, sneezing, a sore throat and wheezing. Pertinent negatives include no abdominal pain, chest pain, diarrhea, dysuria, ear pain, headaches, joint pain, joint swelling, nausea, neck pain, rash, swollen glands or vomiting. Treatments tried: Claritin, Flonase. The treatment provided mild relief.       Constitution: Negative for chills, sweating, fatigue and fever.   HENT: Positive for congestion, postnasal drip, sinus pain, sinus pressure and sore throat. Negative for ear pain and voice change.    Neck: Negative for neck pain and painful lymph nodes.   Cardiovascular: Negative for chest pain.   Eyes: Negative for eye redness.   Respiratory: Positive for cough, sputum production and wheezing. Negative for chest tightness, bloody sputum, COPD, shortness of breath, stridor and asthma.    Gastrointestinal: Negative for abdominal pain, nausea, vomiting and diarrhea.   Endocrine: negative.   Genitourinary: Negative for dysuria.   Musculoskeletal: Negative for muscle ache.   Skin: Negative for rash.   Allergic/Immunologic: Positive for sneezing. Negative for seasonal allergies and asthma.   Neurological: Negative for headaches.   Hematologic/Lymphatic: Negative for swollen lymph nodes.   Psychiatric/Behavioral: Negative.        Objective:      Physical Exam   Constitutional: She is oriented to person, place, and time. She appears well-developed and well-nourished. She is cooperative.  " Non-toxic appearance. She does not have a sickly appearance. She does not appear ill. No distress.   HENT:   Head: Normocephalic and atraumatic.   Right Ear: Hearing, external ear and ear canal normal. No tenderness. Tympanic membrane is not erythematous. A middle ear effusion is present.   Left Ear: Hearing, external ear and ear canal normal. No tenderness. Tympanic membrane is not erythematous. A middle ear effusion is present.   Nose: Nose normal. No mucosal edema, rhinorrhea or nasal deformity. No epistaxis. Right sinus exhibits no maxillary sinus tenderness and no frontal sinus tenderness. Left sinus exhibits no maxillary sinus tenderness and no frontal sinus tenderness.   Mouth/Throat: Uvula is midline, oropharynx is clear and moist and mucous membranes are normal. No trismus in the jaw. Normal dentition. No uvula swelling. Cobblestoning present. No oropharyngeal exudate, posterior oropharyngeal edema or posterior oropharyngeal erythema.   Eyes: Conjunctivae and lids are normal. No scleral icterus.   Neck: Trachea normal, full passive range of motion without pain and phonation normal. Neck supple. No neck rigidity. No edema and no erythema present.   Cardiovascular: Normal rate, regular rhythm, normal heart sounds, intact distal pulses and normal pulses.   Pulmonary/Chest: Effort normal. No respiratory distress. She has no decreased breath sounds. She has rhonchi in the right upper field and the left upper field.   Abdominal: Normal appearance.   Musculoskeletal: Normal range of motion. She exhibits no edema or deformity.   Neurological: She is alert and oriented to person, place, and time. She exhibits normal muscle tone. Coordination normal.   Skin: Skin is warm, dry, intact, not diaphoretic and not pale.   Psychiatric: She has a normal mood and affect. Her speech is normal and behavior is normal. Judgment and thought content normal. Cognition and memory are normal.   Nursing note and vitals reviewed.       "  Assessment:       1. Acute bronchitis, unspecified organism    2. Shortness of breath    3. Cough        Plan:         Acute bronchitis, unspecified organism  -     betamethasone acetate-betamethasone sodium phosphate injection 6 mg    Shortness of breath  -     albuterol (PROVENTIL/VENTOLIN HFA) 90 mcg/actuation inhaler; Inhale 1-2 puffs into the lungs every 4 (four) hours as needed for Wheezing or Shortness of Breath (cough).  Dispense: 1 Inhaler; Refill: 0    Cough  -     promethazine-dextromethorphan (PROMETHAZINE-DM) 6.25-15 mg/5 mL Syrp; Take 5 mLs by mouth every 6 (six) hours as needed (cough).  Dispense: 118 mL; Refill: 0         Pt requests a steroid injection. He has had them in the past (last was >3-6 months ago) with good relief of symptoms and no adverse effects. Offered course of oral steroids in place of injection due to safer side effect profile and ability to discontinue medication if any adverse effects are noted, however, patient prefers injection. Patient informed of potential side effects of steroid injection including elevating blood pressure, increased blood glucose levels, increased risk of opportunistic infections, peptic ulcer disease and GI bleeding, insomnia, tremors, suppression of ones own steroid production, avascular necrosis, osteoporosis, increased intraocular pressure, etc. Patient verbalizes risk/benefit profile and still requests steroid injection.   Pt states she does not have a true allergy to steroids. States she has that listed because she doesn't "like" medrol dose packs.    For cough:  Stop smoking/avoid smoke.  Increase fluids.  Warm liquids may help.  Rest.  Cool mist humidifier may help. Place humidifier in close proximity to you and avoid using a ceiling fan while humidifier is in use.  Follow up with PCP if not improving in 1 week, sooner if worse.  To ER with difficulty breathing or shortness of breath at rest.  Take medications as directed. If your cough medicine " contains promethazine or codeine, do not drive and avoid using alcohol or any other potentially sedating medications or substances while taking cough suppressant.

## 2019-11-26 NOTE — PATIENT INSTRUCTIONS
For cough:  Stop smoking/avoid smoke.  Increase fluids.  Warm liquids may help.  Rest.  Cool mist humidifier may help. Place humidifier in close proximity to you and avoid using a ceiling fan while humidifier is in use.  Follow up with PCP if not improving in 1 week, sooner if worse.  To ER with difficulty breathing or shortness of breath at rest.  Take medications as directed. If your cough medicine contains promethazine or codeine, do not drive and avoid using alcohol or any other potentially sedating medications or substances while taking cough suppressant.

## 2019-12-02 ENCOUNTER — OFFICE VISIT (OUTPATIENT)
Dept: INTERNAL MEDICINE | Facility: CLINIC | Age: 69
End: 2019-12-02
Payer: MEDICARE

## 2019-12-02 VITALS
HEART RATE: 95 BPM | TEMPERATURE: 98 F | SYSTOLIC BLOOD PRESSURE: 126 MMHG | DIASTOLIC BLOOD PRESSURE: 80 MMHG | WEIGHT: 127.19 LBS | OXYGEN SATURATION: 95 % | BODY MASS INDEX: 23.4 KG/M2 | HEIGHT: 62 IN

## 2019-12-02 DIAGNOSIS — D84.9 IMMUNOSUPPRESSED STATUS: ICD-10-CM

## 2019-12-02 DIAGNOSIS — J20.9 ACUTE BRONCHITIS, UNSPECIFIED ORGANISM: Primary | ICD-10-CM

## 2019-12-02 DIAGNOSIS — R05.9 COUGH: ICD-10-CM

## 2019-12-02 PROCEDURE — 1159F MED LIST DOCD IN RCRD: CPT | Mod: HCNC,S$GLB,, | Performed by: NURSE PRACTITIONER

## 2019-12-02 PROCEDURE — 99214 PR OFFICE/OUTPT VISIT, EST, LEVL IV, 30-39 MIN: ICD-10-PCS | Mod: HCNC,S$GLB,, | Performed by: NURSE PRACTITIONER

## 2019-12-02 PROCEDURE — 1101F PT FALLS ASSESS-DOCD LE1/YR: CPT | Mod: HCNC,CPTII,S$GLB, | Performed by: NURSE PRACTITIONER

## 2019-12-02 PROCEDURE — 1159F PR MEDICATION LIST DOCUMENTED IN MEDICAL RECORD: ICD-10-PCS | Mod: HCNC,S$GLB,, | Performed by: NURSE PRACTITIONER

## 2019-12-02 PROCEDURE — 99999 PR PBB SHADOW E&M-EST. PATIENT-LVL IV: CPT | Mod: PBBFAC,HCNC,, | Performed by: NURSE PRACTITIONER

## 2019-12-02 PROCEDURE — 99214 OFFICE O/P EST MOD 30 MIN: CPT | Mod: HCNC,S$GLB,, | Performed by: NURSE PRACTITIONER

## 2019-12-02 PROCEDURE — 99999 PR PBB SHADOW E&M-EST. PATIENT-LVL IV: ICD-10-PCS | Mod: PBBFAC,HCNC,, | Performed by: NURSE PRACTITIONER

## 2019-12-02 PROCEDURE — 1101F PR PT FALLS ASSESS DOC 0-1 FALLS W/OUT INJ PAST YR: ICD-10-PCS | Mod: HCNC,CPTII,S$GLB, | Performed by: NURSE PRACTITIONER

## 2019-12-02 RX ORDER — ALBUTEROL SULFATE 0.83 MG/ML
2.5 SOLUTION RESPIRATORY (INHALATION) EVERY 6 HOURS PRN
Qty: 1 BOX | Refills: 0 | Status: SHIPPED | OUTPATIENT
Start: 2019-12-02 | End: 2020-12-01

## 2019-12-02 RX ORDER — PROMETHAZINE HYDROCHLORIDE AND DEXTROMETHORPHAN HYDROBROMIDE 6.25; 15 MG/5ML; MG/5ML
5 SYRUP ORAL EVERY 6 HOURS PRN
Qty: 118 ML | Refills: 0 | Status: SHIPPED | OUTPATIENT
Start: 2019-12-02 | End: 2019-12-12

## 2019-12-02 NOTE — PROGRESS NOTES
"Subjective:       Patient ID: Emmy Padilla is a 69 y.o. female.    Chief Complaint: Cough (urgent care follow up)    Patient was recently seen in Urgent Care and prescribed rx syrup and albuterol mdi. She feels that she is mildly improved. No worse. No fever. She still reports chest congestion. She is not taking mucinex at this time. She used her brother's nebulizer last night with significant improvement. She comes in today for recheck. She is immunocompromised.     Cough   This is a new problem. The current episode started in the past 7 days. The problem has been gradually improving. The problem occurs constantly. The cough is productive of sputum. Associated symptoms include ear pain, nasal congestion, postnasal drip, shortness of breath and wheezing. Pertinent negatives include no chest pain, chills, ear congestion, eye redness, fever, headaches, heartburn, hemoptysis, myalgias, rash, rhinorrhea, sore throat, sweats or weight loss. The symptoms are aggravated by lying down. She has tried a beta-agonist inhaler and prescription cough suppressant for the symptoms. The treatment provided mild relief. Her past medical history is significant for bronchitis. There is no history of asthma, bronchiectasis or environmental allergies.       /80   Pulse 95   Temp 98 °F (36.7 °C)   Ht 5' 2" (1.575 m)   Wt 57.7 kg (127 lb 3.3 oz)   SpO2 95%   BMI 23.27 kg/m²     Review of Systems   Constitutional: Positive for activity change. Negative for appetite change, chills, diaphoresis, fatigue, fever, unexpected weight change and weight loss.   HENT: Positive for ear pain and postnasal drip. Negative for congestion, dental problem, drooling, ear discharge, hearing loss, mouth sores, nosebleeds, rhinorrhea, sinus pressure, sneezing, sore throat, tinnitus, trouble swallowing and voice change.    Eyes: Negative for pain, discharge, redness and visual disturbance.   Respiratory: Positive for cough, shortness of " breath and wheezing. Negative for hemoptysis, choking and chest tightness.    Cardiovascular: Negative for chest pain, palpitations and leg swelling.   Gastrointestinal: Negative for abdominal distention, abdominal pain, blood in stool, constipation, diarrhea, heartburn, nausea and vomiting.   Endocrine: Negative for cold intolerance and heat intolerance.   Genitourinary: Negative for decreased urine volume, difficulty urinating, dysuria, frequency, hematuria and urgency.   Musculoskeletal: Negative for myalgias.   Skin: Negative for rash.   Allergic/Immunologic: Positive for immunocompromised state. Negative for environmental allergies and food allergies.   Neurological: Negative.  Negative for dizziness, syncope, speech difficulty, light-headedness and headaches.   Psychiatric/Behavioral: Negative for agitation, confusion and hallucinations. The patient is not nervous/anxious.        Objective:      Physical Exam   Constitutional: She is oriented to person, place, and time. She appears well-developed and well-nourished. She is cooperative. No distress.   HENT:   Head: Normocephalic and atraumatic.   Eyes: Conjunctivae are normal. Right eye exhibits no discharge. Left eye exhibits no discharge.   Cardiovascular: Normal rate, regular rhythm and normal heart sounds.   No murmur heard.  Pulmonary/Chest: Effort normal and breath sounds normal. No respiratory distress. She has no wheezes. She has no rales. She exhibits no tenderness.   Abdominal: Soft. She exhibits no distension.   Musculoskeletal: Normal range of motion.   Neurological: She is alert and oriented to person, place, and time.   Skin: Skin is warm and dry. No rash noted. She is not diaphoretic.   Psychiatric: She has a normal mood and affect. Her behavior is normal. Judgment and thought content normal.   Nursing note and vitals reviewed.      Assessment:       1. Acute bronchitis, unspecified organism    2. Cough    3. Immunosuppressed status        Plan:        Emmy was seen today for cough.    Diagnoses and all orders for this visit:    Acute bronchitis, unspecified organism  -     NEBULIZER FOR HOME USE  -     albuterol (PROVENTIL) 2.5 mg /3 mL (0.083 %) nebulizer solution; Take 3 mLs (2.5 mg total) by nebulization every 6 (six) hours as needed for Wheezing. Rescue  -     promethazine-dextromethorphan (PROMETHAZINE-DM) 6.25-15 mg/5 mL Syrp; Take 5 mLs by mouth every 6 (six) hours as needed (cough).    Cough  -     promethazine-dextromethorphan (PROMETHAZINE-DM) 6.25-15 mg/5 mL Syrp; Take 5 mLs by mouth every 6 (six) hours as needed (cough).    Immunosuppressed status    Patient clinically improving  Add mucinex with lots of fluids  Refill promethazine per request  Add nebulizer treatments prn  Follow up if worse, fever

## 2019-12-18 ENCOUNTER — PATIENT OUTREACH (OUTPATIENT)
Dept: ADMINISTRATIVE | Facility: HOSPITAL | Age: 69
End: 2019-12-18

## 2019-12-18 DIAGNOSIS — M06.9 RHEUMATOID ARTHRITIS, INVOLVING UNSPECIFIED SITE, UNSPECIFIED RHEUMATOID FACTOR PRESENCE: Chronic | ICD-10-CM

## 2019-12-18 DIAGNOSIS — R53.1 WEAKNESS: ICD-10-CM

## 2019-12-18 DIAGNOSIS — Z13.6 ENCOUNTER FOR SCREENING FOR CARDIOVASCULAR DISORDERS: ICD-10-CM

## 2019-12-18 DIAGNOSIS — M81.0 OSTEOPOROSIS, UNSPECIFIED OSTEOPOROSIS TYPE, UNSPECIFIED PATHOLOGICAL FRACTURE PRESENCE: Chronic | ICD-10-CM

## 2019-12-18 DIAGNOSIS — R79.9 ABNORMAL FINDING OF BLOOD CHEMISTRY, UNSPECIFIED: ICD-10-CM

## 2019-12-18 DIAGNOSIS — Z00.00 BLOOD TESTS FOR ROUTINE GENERAL PHYSICAL EXAMINATION: Primary | ICD-10-CM

## 2019-12-18 NOTE — PROGRESS NOTES
HM reviewed.   Immunizations abstracted.  Care Everywhere abstracted. No new results found.  Health Maintenance Due   Topic    Lipid Panel: scheduled 12/23/19    Talked to pt. Pt agreed to come in fasting 12/23/19 for lab work prior to upcoming appt. Scheduled accordingly.  Previsit chart audit completed.  *KDL*

## 2019-12-23 ENCOUNTER — LAB VISIT (OUTPATIENT)
Dept: LAB | Facility: HOSPITAL | Age: 69
End: 2019-12-23
Attending: FAMILY MEDICINE
Payer: MEDICARE

## 2019-12-23 DIAGNOSIS — M81.0 OSTEOPOROSIS, UNSPECIFIED OSTEOPOROSIS TYPE, UNSPECIFIED PATHOLOGICAL FRACTURE PRESENCE: Chronic | ICD-10-CM

## 2019-12-23 DIAGNOSIS — R53.1 WEAKNESS: ICD-10-CM

## 2019-12-23 DIAGNOSIS — Z13.6 ENCOUNTER FOR SCREENING FOR CARDIOVASCULAR DISORDERS: ICD-10-CM

## 2019-12-23 DIAGNOSIS — M06.9 RHEUMATOID ARTHRITIS, INVOLVING UNSPECIFIED SITE, UNSPECIFIED RHEUMATOID FACTOR PRESENCE: Chronic | ICD-10-CM

## 2019-12-23 DIAGNOSIS — R79.9 ABNORMAL FINDING OF BLOOD CHEMISTRY, UNSPECIFIED: ICD-10-CM

## 2019-12-23 DIAGNOSIS — Z00.00 BLOOD TESTS FOR ROUTINE GENERAL PHYSICAL EXAMINATION: ICD-10-CM

## 2019-12-23 LAB
25(OH)D3+25(OH)D2 SERPL-MCNC: 23 NG/ML (ref 30–96)
ALBUMIN SERPL BCP-MCNC: 3.4 G/DL (ref 3.5–5.2)
ALP SERPL-CCNC: 127 U/L (ref 55–135)
ALT SERPL W/O P-5'-P-CCNC: 20 U/L (ref 10–44)
ANION GAP SERPL CALC-SCNC: 9 MMOL/L (ref 8–16)
AST SERPL-CCNC: 23 U/L (ref 10–40)
BASOPHILS # BLD AUTO: 0.07 K/UL (ref 0–0.2)
BASOPHILS NFR BLD: 1.3 % (ref 0–1.9)
BILIRUB SERPL-MCNC: 0.3 MG/DL (ref 0.1–1)
BUN SERPL-MCNC: 21 MG/DL (ref 8–23)
CALCIUM SERPL-MCNC: 9.2 MG/DL (ref 8.7–10.5)
CHLORIDE SERPL-SCNC: 109 MMOL/L (ref 95–110)
CHOLEST SERPL-MCNC: 211 MG/DL (ref 120–199)
CHOLEST/HDLC SERPL: 4 {RATIO} (ref 2–5)
CO2 SERPL-SCNC: 25 MMOL/L (ref 23–29)
CREAT SERPL-MCNC: 0.7 MG/DL (ref 0.5–1.4)
DIFFERENTIAL METHOD: ABNORMAL
EOSINOPHIL # BLD AUTO: 0.2 K/UL (ref 0–0.5)
EOSINOPHIL NFR BLD: 3 % (ref 0–8)
ERYTHROCYTE [DISTWIDTH] IN BLOOD BY AUTOMATED COUNT: 15.4 % (ref 11.5–14.5)
EST. GFR  (AFRICAN AMERICAN): >60 ML/MIN/1.73 M^2
EST. GFR  (NON AFRICAN AMERICAN): >60 ML/MIN/1.73 M^2
ESTIMATED AVG GLUCOSE: 111 MG/DL (ref 68–131)
GLUCOSE SERPL-MCNC: 93 MG/DL (ref 70–110)
HBA1C MFR BLD HPLC: 5.5 % (ref 4–5.6)
HCT VFR BLD AUTO: 40.3 % (ref 37–48.5)
HDLC SERPL-MCNC: 53 MG/DL (ref 40–75)
HDLC SERPL: 25.1 % (ref 20–50)
HGB BLD-MCNC: 12.1 G/DL (ref 12–16)
IMM GRANULOCYTES # BLD AUTO: 0.01 K/UL (ref 0–0.04)
IMM GRANULOCYTES NFR BLD AUTO: 0.2 % (ref 0–0.5)
LDLC SERPL CALC-MCNC: 141.6 MG/DL (ref 63–159)
LYMPHOCYTES # BLD AUTO: 1.7 K/UL (ref 1–4.8)
LYMPHOCYTES NFR BLD: 30.7 % (ref 18–48)
MCH RBC QN AUTO: 29.4 PG (ref 27–31)
MCHC RBC AUTO-ENTMCNC: 30 G/DL (ref 32–36)
MCV RBC AUTO: 98 FL (ref 82–98)
MONOCYTES # BLD AUTO: 0.4 K/UL (ref 0.3–1)
MONOCYTES NFR BLD: 6.9 % (ref 4–15)
NEUTROPHILS # BLD AUTO: 3.1 K/UL (ref 1.8–7.7)
NEUTROPHILS NFR BLD: 57.9 % (ref 38–73)
NONHDLC SERPL-MCNC: 158 MG/DL
NRBC BLD-RTO: 0 /100 WBC
PLATELET # BLD AUTO: 288 K/UL (ref 150–350)
PMV BLD AUTO: 10.9 FL (ref 9.2–12.9)
POTASSIUM SERPL-SCNC: 4.2 MMOL/L (ref 3.5–5.1)
PROT SERPL-MCNC: 6.8 G/DL (ref 6–8.4)
RBC # BLD AUTO: 4.12 M/UL (ref 4–5.4)
SODIUM SERPL-SCNC: 143 MMOL/L (ref 136–145)
TRIGL SERPL-MCNC: 82 MG/DL (ref 30–150)
TSH SERPL DL<=0.005 MIU/L-ACNC: 1.12 UIU/ML (ref 0.4–4)
WBC # BLD AUTO: 5.38 K/UL (ref 3.9–12.7)

## 2019-12-23 PROCEDURE — 36415 COLL VENOUS BLD VENIPUNCTURE: CPT | Mod: HCNC,PO

## 2019-12-23 PROCEDURE — 85025 COMPLETE CBC W/AUTO DIFF WBC: CPT | Mod: HCNC

## 2019-12-23 PROCEDURE — 83036 HEMOGLOBIN GLYCOSYLATED A1C: CPT | Mod: HCNC

## 2019-12-23 PROCEDURE — 80061 LIPID PANEL: CPT | Mod: HCNC

## 2019-12-23 PROCEDURE — 82306 VITAMIN D 25 HYDROXY: CPT | Mod: HCNC

## 2019-12-23 PROCEDURE — 80053 COMPREHEN METABOLIC PANEL: CPT | Mod: HCNC

## 2019-12-23 PROCEDURE — 84443 ASSAY THYROID STIM HORMONE: CPT | Mod: HCNC

## 2019-12-28 ENCOUNTER — HOSPITAL ENCOUNTER (OUTPATIENT)
Dept: RADIOLOGY | Facility: CLINIC | Age: 69
Discharge: HOME | End: 2019-12-28
Attending: NURSE PRACTITIONER
Payer: MEDICARE

## 2019-12-28 ENCOUNTER — OFFICE VISIT (OUTPATIENT)
Dept: URGENT CARE | Facility: CLINIC | Age: 69
End: 2019-12-28
Payer: MEDICARE

## 2019-12-28 VITALS
DIASTOLIC BLOOD PRESSURE: 60 MMHG | WEIGHT: 127.44 LBS | SYSTOLIC BLOOD PRESSURE: 130 MMHG | OXYGEN SATURATION: 97 % | BODY MASS INDEX: 23.45 KG/M2 | HEIGHT: 62 IN | HEART RATE: 94 BPM | TEMPERATURE: 98 F

## 2019-12-28 DIAGNOSIS — J40 BRONCHITIS: Primary | ICD-10-CM

## 2019-12-28 DIAGNOSIS — R05.8 COUGH PRESENT FOR GREATER THAN 3 WEEKS: ICD-10-CM

## 2019-12-28 PROCEDURE — 71046 X-RAY EXAM CHEST 2 VIEWS: CPT | Mod: S$GLB,,, | Performed by: RADIOLOGY

## 2019-12-28 PROCEDURE — 71046 XR CHEST PA AND LATERAL: ICD-10-PCS | Mod: S$GLB,,, | Performed by: RADIOLOGY

## 2019-12-28 PROCEDURE — 99214 PR OFFICE/OUTPT VISIT, EST, LEVL IV, 30-39 MIN: ICD-10-PCS | Mod: S$GLB,,, | Performed by: NURSE PRACTITIONER

## 2019-12-28 PROCEDURE — 99214 OFFICE O/P EST MOD 30 MIN: CPT | Mod: S$GLB,,, | Performed by: NURSE PRACTITIONER

## 2019-12-28 RX ORDER — PROMETHAZINE HYDROCHLORIDE 6.25 MG/5ML
5 SYRUP ORAL EVERY 6 HOURS PRN
Qty: 118 ML | Refills: 0 | Status: SHIPPED | OUTPATIENT
Start: 2019-12-28 | End: 2021-10-26 | Stop reason: ALTCHOICE

## 2019-12-28 RX ORDER — PROMETHAZINE HYDROCHLORIDE AND DEXTROMETHORPHAN HYDROBROMIDE 6.25; 15 MG/5ML; MG/5ML
5 SYRUP ORAL EVERY 4 HOURS PRN
Qty: 118 ML | Refills: 0 | Status: SHIPPED | OUTPATIENT
Start: 2019-12-28 | End: 2020-01-07

## 2019-12-28 NOTE — PROGRESS NOTES
"Subjective:       Patient ID: Emmy Padilla is a 69 y.o. female.    Vitals:  height is 5' 2" (1.575 m) and weight is 57.8 kg (127 lb 6.8 oz). Her temperature is 97.8 °F (36.6 °C). Her blood pressure is 130/60 and her pulse is 94. Her oxygen saturation is 97%.     Chief Complaint: URI    URI    This is a new problem. The current episode started in the past 7 days. The problem has been gradually worsening. There has been no fever. Associated symptoms include congestion, coughing, a plugged ear sensation, sinus pain, a sore throat, swollen glands and wheezing. Pertinent negatives include no abdominal pain, chest pain, diarrhea, dysuria, ear pain, headaches, joint pain, joint swelling, nausea, neck pain, rash, rhinorrhea, sneezing or vomiting. She has tried antihistamine (Claritin, Flonase) for the symptoms. The treatment provided mild relief.       Constitution: Positive for fatigue. Negative for chills, sweating and fever.   HENT: Positive for congestion, postnasal drip, sinus pain, sinus pressure, sore throat and voice change. Negative for ear pain.    Neck: Positive for painful lymph nodes. Negative for neck pain.   Cardiovascular: Negative for chest pain.   Eyes: Negative for eye redness.   Respiratory: Positive for cough, sputum production and wheezing. Negative for chest tightness, bloody sputum, COPD, shortness of breath, stridor and asthma.    Gastrointestinal: Negative for abdominal pain, nausea, vomiting and diarrhea.   Genitourinary: Positive for bladder incontinence. Negative for dysuria.   Musculoskeletal: Negative for muscle ache.   Skin: Negative for rash.   Allergic/Immunologic: Negative for seasonal allergies, asthma and sneezing.   Neurological: Negative for headaches.   Hematologic/Lymphatic: Positive for swollen lymph nodes.       Objective:      Physical Exam   Constitutional: She is oriented to person, place, and time. Vital signs are normal. She appears well-developed and well-nourished. " She is cooperative.  Non-toxic appearance. She does not have a sickly appearance. She does not appear ill. No distress.   HENT:   Head: Normocephalic and atraumatic.   Right Ear: Hearing, tympanic membrane, external ear and ear canal normal.   Left Ear: Hearing, tympanic membrane, external ear and ear canal normal.   Nose: Nose normal. No mucosal edema, rhinorrhea or nasal deformity. No epistaxis. Right sinus exhibits no maxillary sinus tenderness and no frontal sinus tenderness. Left sinus exhibits no maxillary sinus tenderness and no frontal sinus tenderness.   Mouth/Throat: Uvula is midline, oropharynx is clear and moist and mucous membranes are normal. No trismus in the jaw. Normal dentition. No uvula swelling. No oropharyngeal exudate, posterior oropharyngeal edema or posterior oropharyngeal erythema.   Eyes: Conjunctivae and lids are normal. No scleral icterus.   Neck: Trachea normal, full passive range of motion without pain and phonation normal. Neck supple. No neck rigidity. No edema and no erythema present.   Cardiovascular: Normal rate, regular rhythm, normal heart sounds, intact distal pulses and normal pulses.   Pulmonary/Chest: Effort normal. No stridor. No respiratory distress. She has no decreased breath sounds. She has wheezes in the left upper field and the left middle field. She has no rhonchi. She has no rales.   Abdominal: Normal appearance.   Musculoskeletal: Normal range of motion. She exhibits no edema or deformity.   Neurological: She is alert and oriented to person, place, and time. She exhibits normal muscle tone. Coordination normal.   Skin: Skin is warm, dry, intact, not diaphoretic and not pale.   Psychiatric: She has a normal mood and affect. Her speech is normal and behavior is normal. Judgment and thought content normal. Cognition and memory are normal.   Nursing note and vitals reviewed.      Xr Chest Pa And Lateral    Result Date: 12/28/2019  EXAMINATION: XR CHEST PA AND LATERAL  "CLINICAL HISTORY: Cough TECHNIQUE: PA and lateral views of the chest were performed. COMPARISON: 12/19/2018 FINDINGS: The cardiac and mediastinal silhouettes appear within normal limits.   The lungs are clear bilaterally.  No acute osseous findings demonstrated.     No acute findings. Electronically signed by: Ruben Hadley MD Date:    12/28/2019 Time:    12:51  Assessment:       1. Bronchitis    2. Cough present for greater than 3 weeks        Plan:         Bronchitis    Cough present for greater than 3 weeks  -     XR CHEST PA AND LATERAL; Future; Expected date: 12/28/2019  -     promethazine (PHENERGAN) 6.25 mg/5 mL syrup; Take 5 mLs (6.25 mg total) by mouth every 6 (six) hours as needed.  Dispense: 118 mL; Refill: 0        Vital are reassuring  Will encourage rest and oral rehydration  Will recommend OTC medication for symptomatic relief plain (Mucinex, Ibuprofen, Tylenol, Flonase, Cough drops, Cepacol, Elderberry, Vitamin C )    I have discussed the diagnosis, treatment plan and recommendation with PCP. Patient verbalized understanding and is agreeable to the plan.   ED precaution is given. AVS printed and given to patient upon discharge with information regarding this visit.   All questions was addressed prior to discharge.     Jarett "Nayeli" MELISSA Chester    "

## 2019-12-28 NOTE — PATIENT INSTRUCTIONS
What Is Acute Bronchitis?  Acute bronchitis is when the airways in your lungs (bronchial tubes) become red and swollen (inflamed). It is usually caused by a viral infection. But it can also occur because of a bacteria or allergen. Symptoms include a cough that produces yellow or greenish mucus and can last for days or sometimes weeks.  Inside healthy lungs    Air travels in and out of the lungs through the airways. The linings of these airways produce sticky mucus. This mucus traps particles that enter the lungs. Tiny structures called cilia then sweep the particles out of the airways.     Healthy airway: Airways are normally open. Air moves in and out easily.      Healthy cilia: Tiny, hairlike cilia sweep mucus and particles up and out of the airways.   Lungs with bronchitis  Bronchitis often occurs with a cold or the flu virus. The airways become inflamed (red and swollen). There is a deep hacking cough from the extra mucus. Other symptoms may include:  · Wheezing  · Chest discomfort  · Shortness of breath  · Mild fever  A second infection, this time due to bacteria, may then occur. And airways irritated by allergens or smoke are more likely to get infected.        Inflamed airway: Inflammation and extra mucus narrow the airway, causing shortness of breath.      Impaired cilia: Extra mucus impairs cilia, causing congestion and wheezing. Smoking makes the problem worse.   Making a diagnosis  A physical exam, health history, and certain tests help your healthcare provider make the diagnosis.  Health history  Your healthcare provider will ask you about your symptoms.  The exam  Your provider listens to your chest for signs of congestion. He or she may also check your ears, nose, and throat.  Possible tests  · A sputum test for bacteria. This requires a sample of mucus from your lungs.  · A nasal or throat swab. This tests to see if you have a bacterial infection.  · A chest X-ray. This is done if your healthcare  provider thinks you have pneumonia.  · Tests to check for an underlying condition. Other tests may be done to check for things such as allergies, asthma, or COPD (chronic obstructive pulmonary disease). You may need to see a specialist for more lung function testing.  Treating a cough  The main treatment for bronchitis is easing symptoms. Avoiding smoke, allergens, and other things that trigger coughing can often help. If the infection is bacterial, you may be given antibiotics. During the illness, it's important to get plenty of sleep. To ease symptoms:  · Dont smoke. Also avoid secondhand smoke.  · Use a humidifier. Or try breathing in steam from a hot shower. This may help loosen mucus.  · Drink a lot of water and juice. They can soothe the throat and may help thin mucus.  · Sit up or use extra pillows when in bed. This helps to lessen coughing and congestion.  · Ask your provider about using medicine. Ask about using cough medicine, pain and fever medicine, or a decongestant.  Antibiotics  Most cases of bronchitis are caused by cold or flu viruses. They dont need antibiotics to treat them, even if your mucus is thick and green or yellow. Antibiotics dont treat viral illness and antibiotics have not been shown to have any benefit in cases of acute bronchitis. Taking antibiotics when they are not needed increases your risk of getting an infection later that is antibiotic-resistant. Antibiotics can also cause severe cases of diarrhea that require other antibiotics to treat.  It is important that you accept your healthcare provider's opinion to not use antibiotics. Your provider will prescribe antibiotics if the infection is caused by bacteria. If they are prescribed:  · Take all of the medicine. Take the medicine until it is used up, even if symptoms have improved. If you dont, the bronchitis may come back.  · Take the medicines as directed. For instance, some medicines should be taken with food.  · Ask about  side effects. Ask your provider or pharmacist what side effects are common, and what to do about them.  Follow-up care  You should see your provider again in 2 to 3 weeks. By this time, symptoms should have improved. An infection that lasts longer may mean you have a more serious problem.  Prevention  · Avoid tobacco smoke. If you smoke, quit. Stay away from smoky places. Ask friends and family not to smoke around you, or in your home or car.  · Get checked for allergies.  · Ask your provider about getting a yearly flu shot. Also ask about pneumococcal or pneumonia shots.  · Wash your hands often. This helps reduce the chance of picking up viruses that cause colds and flu.  Call your healthcare provider if:  · Symptoms worsen, or you have new symptoms  · Breathing problems worsen or  become severe  · Symptoms dont get better within a week, or within 3 days of taking antibiotics   Date Last Reviewed: 2/1/2017  © 4339-5089 [employer]. [address] [place] [address]. All rights reserved. This information is not intended as a substitute for professional medical care. Always follow your healthcare professional's instructions.        What Is Acute Bronchitis?  Acute bronchitis is when the airways in your lungs (bronchial tubes) become red and swollen (inflamed). It is usually caused by a viral infection. But it can also occur because of a bacteria or allergen. Symptoms include a cough that produces yellow or greenish mucus and can last for days or sometimes weeks.  Inside healthy lungs    Air travels in and out of the lungs through the airways. The linings of these airways produce sticky mucus. This mucus traps particles that enter the lungs. Tiny structures called cilia then sweep the particles out of the airways.     Healthy airway: Airways are normally open. Air moves in and out easily.      Healthy cilia: Tiny, hairlike cilia sweep mucus and particles up and out of the airways.   Lungs with  bronchitis  Bronchitis often occurs with a cold or the flu virus. The airways become inflamed (red and swollen). There is a deep hacking cough from the extra mucus. Other symptoms may include:  · Wheezing  · Chest discomfort  · Shortness of breath  · Mild fever  A second infection, this time due to bacteria, may then occur. And airways irritated by allergens or smoke are more likely to get infected.        Inflamed airway: Inflammation and extra mucus narrow the airway, causing shortness of breath.      Impaired cilia: Extra mucus impairs cilia, causing congestion and wheezing. Smoking makes the problem worse.   Making a diagnosis  A physical exam, health history, and certain tests help your healthcare provider make the diagnosis.  Health history  Your healthcare provider will ask you about your symptoms.  The exam  Your provider listens to your chest for signs of congestion. He or she may also check your ears, nose, and throat.  Possible tests  · A sputum test for bacteria. This requires a sample of mucus from your lungs.  · A nasal or throat swab. This tests to see if you have a bacterial infection.  · A chest X-ray. This is done if your healthcare provider thinks you have pneumonia.  · Tests to check for an underlying condition. Other tests may be done to check for things such as allergies, asthma, or COPD (chronic obstructive pulmonary disease). You may need to see a specialist for more lung function testing.  Treating a cough  The main treatment for bronchitis is easing symptoms. Avoiding smoke, allergens, and other things that trigger coughing can often help. If the infection is bacterial, you may be given antibiotics. During the illness, it's important to get plenty of sleep. To ease symptoms:  · Dont smoke. Also avoid secondhand smoke.  · Use a humidifier. Or try breathing in steam from a hot shower. This may help loosen mucus.  · Drink a lot of water and juice. They can soothe the throat and may help thin  mucus.  · Sit up or use extra pillows when in bed. This helps to lessen coughing and congestion.  · Ask your provider about using medicine. Ask about using cough medicine, pain and fever medicine, or a decongestant.  Antibiotics  Most cases of bronchitis are caused by cold or flu viruses. They dont need antibiotics to treat them, even if your mucus is thick and green or yellow. Antibiotics dont treat viral illness and antibiotics have not been shown to have any benefit in cases of acute bronchitis. Taking antibiotics when they are not needed increases your risk of getting an infection later that is antibiotic-resistant. Antibiotics can also cause severe cases of diarrhea that require other antibiotics to treat.  It is important that you accept your healthcare provider's opinion to not use antibiotics. Your provider will prescribe antibiotics if the infection is caused by bacteria. If they are prescribed:  · Take all of the medicine. Take the medicine until it is used up, even if symptoms have improved. If you dont, the bronchitis may come back.  · Take the medicines as directed. For instance, some medicines should be taken with food.  · Ask about side effects. Ask your provider or pharmacist what side effects are common, and what to do about them.  Follow-up care  You should see your provider again in 2 to 3 weeks. By this time, symptoms should have improved. An infection that lasts longer may mean you have a more serious problem.  Prevention  · Avoid tobacco smoke. If you smoke, quit. Stay away from smoky places. Ask friends and family not to smoke around you, or in your home or car.  · Get checked for allergies.  · Ask your provider about getting a yearly flu shot. Also ask about pneumococcal or pneumonia shots.  · Wash your hands often. This helps reduce the chance of picking up viruses that cause colds and flu.  Call your healthcare provider if:  · Symptoms worsen, or you have new symptoms  · Breathing  problems worsen or  become severe  · Symptoms dont get better within a week, or within 3 days of taking antibiotics   Date Last Reviewed: 2/1/2017  © 9169-8281 [employer]. [address] [place] [address], [place] [address]. All rights reserved. This information is not intended as a substitute for professional medical care. Always follow your healthcare professional's instructions.

## 2019-12-31 ENCOUNTER — OFFICE VISIT (OUTPATIENT)
Dept: INTERNAL MEDICINE | Facility: CLINIC | Age: 69
End: 2019-12-31
Payer: MEDICARE

## 2019-12-31 VITALS
HEIGHT: 62 IN | HEART RATE: 98 BPM | DIASTOLIC BLOOD PRESSURE: 64 MMHG | BODY MASS INDEX: 23.37 KG/M2 | TEMPERATURE: 98 F | SYSTOLIC BLOOD PRESSURE: 108 MMHG | WEIGHT: 127 LBS

## 2019-12-31 DIAGNOSIS — J32.9 SINUSITIS, UNSPECIFIED CHRONICITY, UNSPECIFIED LOCATION: ICD-10-CM

## 2019-12-31 DIAGNOSIS — Z00.00 ANNUAL PHYSICAL EXAM: Primary | ICD-10-CM

## 2019-12-31 PROCEDURE — 99999 PR PBB SHADOW E&M-EST. PATIENT-LVL III: CPT | Mod: PBBFAC,HCNC,, | Performed by: FAMILY MEDICINE

## 2019-12-31 PROCEDURE — 99999 PR PBB SHADOW E&M-EST. PATIENT-LVL III: ICD-10-PCS | Mod: PBBFAC,HCNC,, | Performed by: FAMILY MEDICINE

## 2019-12-31 PROCEDURE — 99397 PR PREVENTIVE VISIT,EST,65 & OVER: ICD-10-PCS | Mod: HCNC,S$GLB,, | Performed by: FAMILY MEDICINE

## 2019-12-31 PROCEDURE — 99397 PER PM REEVAL EST PAT 65+ YR: CPT | Mod: HCNC,S$GLB,, | Performed by: FAMILY MEDICINE

## 2019-12-31 RX ORDER — AMOXICILLIN AND CLAVULANATE POTASSIUM 875; 125 MG/1; MG/1
1 TABLET, FILM COATED ORAL EVERY 12 HOURS
Qty: 20 TABLET | Refills: 0 | Status: SHIPPED | OUTPATIENT
Start: 2019-12-31 | End: 2020-01-10

## 2019-12-31 RX ORDER — PREDNISONE 20 MG/1
20 TABLET ORAL DAILY
Qty: 5 TABLET | Refills: 0 | Status: SHIPPED | OUTPATIENT
Start: 2019-12-31 | End: 2020-01-05

## 2019-12-31 NOTE — PROGRESS NOTES
"Subjective:      Patient ID: Emmy Padilla is a 69 y.o. female.    Chief Complaint: Follow-up and Cough    HPI  68 yo here for annual visit.  She has been seen 3 times in [facility] for cough/bronchitis symptoms.  Having a lot of sinus pain/pressure/drainage.  No fever/chills.  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    PMR (polymyalgia rheumatica)     resolved    Rheumatoid arthritis 12/2014    Dr. Traore     Family History   Problem Relation Age of Onset    Cancer Father         Esophageal    Cancer Mother     Cancer Brother 60        colon CA    Hypertension Brother     Hyperlipidemia Brother     Cancer Maternal Grandmother 92        breast CA     Past Surgical History:   Procedure Laterality Date    CONDYLOMA EXCISION/FULGURATION  2003    Anal    HAND SURGERY Right     middle finger trigger and swaying     nodules removed from rectal area      RETINAL DETACHMENT SURGERY Right 8/2106    RETINAL DETACHMENT SURGERY       Social History     Tobacco Use    Smoking status: Current Every Day Smoker     Packs/day: 0.50     Types: Cigarettes    Smokeless tobacco: Never Used   Substance Use Topics    Alcohol use: No     Alcohol/week: 0.0 standard drinks    Drug use: No       /64   Pulse 98   Temp 98.1 °F (36.7 °C) (Oral)   Ht 5' 2" (1.575 m)   Wt 57.6 kg (126 lb 15.8 oz)   BMI 23.23 kg/m²     Review of Systems   Constitutional: Negative for activity change, appetite change, chills, diaphoresis, fatigue, fever and unexpected weight change.   HENT: Positive for congestion, postnasal drip, sinus pressure and sinus pain. Negative for ear pain, hearing loss, rhinorrhea and tinnitus.    Eyes: Negative for " visual disturbance.   Respiratory: Positive for cough. Negative for shortness of breath and wheezing.    Cardiovascular: Negative for chest pain, palpitations and leg swelling.   Gastrointestinal: Negative for abdominal distention.   Genitourinary: Negative for dysuria, frequency, hematuria and urgency.   Musculoskeletal: Negative for back pain and joint swelling.   Neurological: Positive for headaches. Negative for weakness.   Hematological: Negative for adenopathy.   Psychiatric/Behavioral: Negative for confusion and decreased concentration.       Objective:     Physical Exam   Constitutional: She is oriented to person, place, and time. She appears well-developed and well-nourished. No distress.   HENT:   Right Ear: External ear normal.   Left Ear: External ear normal.   Nose: Nose normal.   Mouth/Throat: Oropharynx is clear and moist.   Eyes: Pupils are equal, round, and reactive to light. Conjunctivae are normal.   Neck: Normal range of motion. Neck supple. Carotid bruit is not present. No thyromegaly present.   Cardiovascular: Normal rate, regular rhythm and normal heart sounds.   Pulmonary/Chest: Effort normal and breath sounds normal. No respiratory distress. She has no wheezes. She has no rales.   Abdominal: Soft. Bowel sounds are normal. She exhibits no distension. There is no tenderness. There is no guarding.   Musculoskeletal: She exhibits no edema.   Lymphadenopathy:     She has no cervical adenopathy.   Neurological: She is alert and oriented to person, place, and time. No cranial nerve deficit.   Skin: Skin is warm and dry. No rash noted.   Psychiatric: She has a normal mood and affect. Her behavior is normal. Judgment and thought content normal.   Nursing note and vitals reviewed.      Lab Results   Component Value Date    WBC 5.38 12/23/2019    HGB 12.1 12/23/2019    HCT 40.3 12/23/2019     12/23/2019    CHOL 211 (H) 12/23/2019    TRIG 82 12/23/2019    HDL 53 12/23/2019    ALT 20 12/23/2019     AST 23 12/23/2019     12/23/2019    K 4.2 12/23/2019     12/23/2019    CREATININE 0.7 12/23/2019    BUN 21 12/23/2019    CO2 25 12/23/2019    TSH 1.125 12/23/2019    HGBA1C 5.5 12/23/2019       Assessment:     1. Annual physical exam    2. Sinusitis, unspecified chronicity, unspecified location         Plan:     Annual physical exam    Sinusitis, unspecified chronicity, unspecified location    Other orders  -     amoxicillin-clavulanate 875-125mg (AUGMENTIN) 875-125 mg per tablet; Take 1 tablet by mouth every 12 (twelve) hours. for 10 days  Dispense: 20 tablet; Refill: 0  -     predniSONE (DELTASONE) 20 MG tablet; Take 1 tablet (20 mg total) by mouth once daily. for 5 days  Dispense: 5 tablet; Refill: 0    Reviewed labs, stable overall  Cont daily chronic meds  Shingrix at pharmacy when well  Start augmentin bid x 10 days.  Will do prednisone 20mg x 5 days//pt does not like prednisone  F/u if no improvement on above//otherwise annually

## 2020-01-30 ENCOUNTER — TELEPHONE (OUTPATIENT)
Dept: INTERNAL MEDICINE | Facility: CLINIC | Age: 70
End: 2020-01-30

## 2020-01-30 NOTE — TELEPHONE ENCOUNTER
Pt notified. She said, her sister told her that there is a [facility] near the town she will be in. Advised her to contact us of she has any problems.

## 2020-01-30 NOTE — TELEPHONE ENCOUNTER
----- Message from Romina Diaz sent at 1/30/2020 10:03 AM CST -----  Contact: patient  Calling concerning not having enough medication to go out of town and don't know what to do. Please call patient today @ [phone]. Thanks, loli

## 2020-01-30 NOTE — TELEPHONE ENCOUNTER
Pt will be traveling to [place] on Wednesday. She will be there for 2 weeks. She has 14 pills left on her xanax. She wants to know if she will be able to get a refill while she is in [place]? She will; be out before she return home.

## 2020-02-06 ENCOUNTER — TELEPHONE (OUTPATIENT)
Dept: INTERNAL MEDICINE | Facility: CLINIC | Age: 70
End: 2020-02-06

## 2020-02-06 DIAGNOSIS — F41.1 ANXIETY STATE: ICD-10-CM

## 2020-02-06 RX ORDER — CLONAZEPAM 1 MG/1
TABLET ORAL
Qty: 30 TABLET | Refills: 3 | Status: SHIPPED | OUTPATIENT
Start: 2020-02-06 | End: 2020-02-06 | Stop reason: SDUPTHER

## 2020-02-06 RX ORDER — CLONAZEPAM 1 MG/1
TABLET ORAL
Qty: 30 TABLET | Refills: 3 | Status: SHIPPED | OUTPATIENT
Start: 2020-02-06 | End: 2020-06-02 | Stop reason: SDUPTHER

## 2020-02-06 NOTE — TELEPHONE ENCOUNTER
----- Message from Maximino Miller sent at 2/6/2020  3:52 PM CST -----  Contact: Pt  Please give pt a call at .[phone] (home) she is still having trouble getting the Clonazepam filled

## 2020-02-06 NOTE — TELEPHONE ENCOUNTER
----- Message from Ava Molina sent at 2/6/2020 10:23 AM CST -----  Contact: pt   Pt needing a call back regarding her refill on the medication clonazePAM (KLONOPIN) 1 MG tablet... Pt states she have no more refills and need a prescription called in before she leave for out of town     Pt contact # [phone]

## 2020-02-06 NOTE — TELEPHONE ENCOUNTER
Called pt and let her know that Nicole has sent the request again to re send the clonazepam or print it so we can fax it to them.  Will call her once she has done this as she is still seeing patients right now

## 2020-03-20 ENCOUNTER — TELEPHONE (OUTPATIENT)
Dept: INTERNAL MEDICINE | Facility: CLINIC | Age: 70
End: 2020-03-20

## 2020-03-20 NOTE — TELEPHONE ENCOUNTER
Called pt and let know that Dr. Gutierrez said:    Ok to take extra temporarily.  Get outside and go for walks, go for drives.  Fresh air is good.

## 2020-03-20 NOTE — TELEPHONE ENCOUNTER
Pt would like to know if she could take a half of Klonopin in addition to the whole pill that she takes. She would like to take the half around lunch time. Pt stated that she has been having more anxiety because of what's going on right now (covid-19). Pt would also like to know if she could walk around her neighborhood without being in close contact with anyone else or perhaps take a ride in her car to get a smoothie.

## 2020-03-20 NOTE — TELEPHONE ENCOUNTER
----- Message from Andrea Hendricks sent at 3/20/2020  8:34 AM CDT -----  Contact: pt   Pt would like cb, in reference to concerns with medication. Pt doesn't need refills just has some concerns would like to discuss.         [phone]      .

## 2020-06-02 ENCOUNTER — TELEPHONE (OUTPATIENT)
Dept: INTERNAL MEDICINE | Facility: CLINIC | Age: 70
End: 2020-06-02

## 2020-06-02 DIAGNOSIS — F41.1 ANXIETY STATE: ICD-10-CM

## 2020-06-02 RX ORDER — CLONAZEPAM 1 MG/1
1 TABLET ORAL 2 TIMES DAILY PRN
Qty: 45 TABLET | Refills: 3 | Status: SHIPPED | OUTPATIENT
Start: 2020-06-02 | End: 2020-11-02

## 2020-06-02 NOTE — TELEPHONE ENCOUNTER
Pt states she needs a new prescription for clonazepam to reflect how she's been taking it. She said, she already discussed this with Dr. Gutierrez. She takes, a half tablet around noon and one whole tablet at night. The way the prescription is written now, she's running out of medicine before the month is out. LV 9/17/19, no f/u

## 2020-06-02 NOTE — TELEPHONE ENCOUNTER
----- Message from Chely Reji sent at 6/2/2020  9:34 AM CDT -----  Contact: self  Pt requesting a call back regarding her prescription for clonazePAM (KLONOPIN) 1 MG tablet. She states that she spoke with Dr. Gutierrez about having the amount changed so that when she has to take 1/2 pill she would still have enough to last her the whole month. Pt is requesting a new prescription be sent to her pharmacy. Please call pt back at [phone]        [facility]/pharmacy #[id number] - [place] - [address] [facility]  [address] [place] [address]  Phone: [phone] Fax: [phone]

## 2020-06-05 ENCOUNTER — LAB VISIT (OUTPATIENT)
Dept: LAB | Facility: HOSPITAL | Age: 70
End: 2020-06-05
Attending: FAMILY MEDICINE
Payer: MEDICARE

## 2020-06-05 ENCOUNTER — OFFICE VISIT (OUTPATIENT)
Dept: INTERNAL MEDICINE | Facility: CLINIC | Age: 70
End: 2020-06-05
Payer: MEDICARE

## 2020-06-05 VITALS
BODY MASS INDEX: 23.17 KG/M2 | DIASTOLIC BLOOD PRESSURE: 62 MMHG | HEIGHT: 62 IN | WEIGHT: 125.88 LBS | SYSTOLIC BLOOD PRESSURE: 110 MMHG | TEMPERATURE: 98 F | HEART RATE: 74 BPM

## 2020-06-05 DIAGNOSIS — Z12.31 ENCOUNTER FOR SCREENING MAMMOGRAM FOR MALIGNANT NEOPLASM OF BREAST: ICD-10-CM

## 2020-06-05 DIAGNOSIS — Z11.9 SCREENING EXAMINATION FOR UNSPECIFIED INFECTIOUS DISEASE: ICD-10-CM

## 2020-06-05 DIAGNOSIS — Z91.038 ALLERGIC REACTION TO INSECT BITE: Primary | ICD-10-CM

## 2020-06-05 DIAGNOSIS — Z12.39 SCREENING FOR BREAST CANCER: ICD-10-CM

## 2020-06-05 PROCEDURE — 99214 OFFICE O/P EST MOD 30 MIN: CPT | Mod: HCNC,25,S$GLB, | Performed by: FAMILY MEDICINE

## 2020-06-05 PROCEDURE — 1101F PT FALLS ASSESS-DOCD LE1/YR: CPT | Mod: HCNC,CPTII,S$GLB, | Performed by: FAMILY MEDICINE

## 2020-06-05 PROCEDURE — 1159F PR MEDICATION LIST DOCUMENTED IN MEDICAL RECORD: ICD-10-PCS | Mod: HCNC,S$GLB,, | Performed by: FAMILY MEDICINE

## 2020-06-05 PROCEDURE — 96372 PR INJECTION,THERAP/PROPH/DIAG2ST, IM OR SUBCUT: ICD-10-PCS | Mod: HCNC,S$GLB,, | Performed by: FAMILY MEDICINE

## 2020-06-05 PROCEDURE — 1101F PR PT FALLS ASSESS DOC 0-1 FALLS W/OUT INJ PAST YR: ICD-10-PCS | Mod: HCNC,CPTII,S$GLB, | Performed by: FAMILY MEDICINE

## 2020-06-05 PROCEDURE — 99214 PR OFFICE/OUTPT VISIT, EST, LEVL IV, 30-39 MIN: ICD-10-PCS | Mod: HCNC,25,S$GLB, | Performed by: FAMILY MEDICINE

## 2020-06-05 PROCEDURE — 1159F MED LIST DOCD IN RCRD: CPT | Mod: HCNC,S$GLB,, | Performed by: FAMILY MEDICINE

## 2020-06-05 PROCEDURE — 99999 PR PBB SHADOW E&M-EST. PATIENT-LVL IV: ICD-10-PCS | Mod: PBBFAC,HCNC,, | Performed by: FAMILY MEDICINE

## 2020-06-05 PROCEDURE — 99999 PR PBB SHADOW E&M-EST. PATIENT-LVL IV: CPT | Mod: PBBFAC,HCNC,, | Performed by: FAMILY MEDICINE

## 2020-06-05 PROCEDURE — 86769 SARS-COV-2 COVID-19 ANTIBODY: CPT | Mod: HCNC

## 2020-06-05 PROCEDURE — 36415 COLL VENOUS BLD VENIPUNCTURE: CPT | Mod: HCNC,PO

## 2020-06-05 PROCEDURE — 1125F PR PAIN SEVERITY QUANTIFIED, PAIN PRESENT: ICD-10-PCS | Mod: HCNC,S$GLB,, | Performed by: FAMILY MEDICINE

## 2020-06-05 PROCEDURE — 96372 THER/PROPH/DIAG INJ SC/IM: CPT | Mod: HCNC,S$GLB,, | Performed by: FAMILY MEDICINE

## 2020-06-05 PROCEDURE — 1125F AMNT PAIN NOTED PAIN PRSNT: CPT | Mod: HCNC,S$GLB,, | Performed by: FAMILY MEDICINE

## 2020-06-05 RX ORDER — METHYLPREDNISOLONE ACETATE 80 MG/ML
80 INJECTION, SUSPENSION INTRA-ARTICULAR; INTRALESIONAL; INTRAMUSCULAR; SOFT TISSUE
Status: COMPLETED | OUTPATIENT
Start: 2020-06-05 | End: 2020-06-05

## 2020-06-05 RX ORDER — TRIAMCINOLONE ACETONIDE 1 MG/G
CREAM TOPICAL 2 TIMES DAILY
Qty: 45 G | Refills: 0 | Status: SHIPPED | OUTPATIENT
Start: 2020-06-05 | End: 2022-01-10 | Stop reason: ALTCHOICE

## 2020-06-05 RX ORDER — METHOTREXATE 25 MG/ML
20 INJECTION INTRA-ARTERIAL; INTRAMUSCULAR; INTRATHECAL; INTRAVENOUS WEEKLY
COMMUNITY
Start: 2020-05-29 | End: 2022-01-10 | Stop reason: ALTCHOICE

## 2020-06-05 RX ADMIN — METHYLPREDNISOLONE ACETATE 80 MG: 80 INJECTION, SUSPENSION INTRA-ARTICULAR; INTRALESIONAL; INTRAMUSCULAR; SOFT TISSUE at 11:06

## 2020-06-05 NOTE — PROGRESS NOTES
Subjective:       Patient ID: Emmy Padilla is a 69 y.o. female.    Chief Complaint: Insect Bite (left hand swollen/red)    HPI   Pt here today with insect bite to left dorsal hand that started a few days ago.  Swelling a little worse  Area is red and itchy  No pain or fever  Has been applying benadryl cream with no improvement    Had respiratory illnesses earlier this year that was hard to shake, had multiple visits. Interested in covid antibody test  Review of Systems   Musculoskeletal: Positive for arthralgias and joint swelling.   Skin: Positive for color change.       Objective:      Physical Exam   Musculoskeletal:   Left dorsal hand with erythema, swelling. Full rom. Mild warmth. No sign of cellulitis        Assessment:       1. Allergic reaction to insect bite    2. Screening examination for unspecified infectious disease    3. Screening for breast cancer    4. Encounter for screening mammogram for malignant neoplasm of breast         Plan:         Depo medrol 80mg IM now  Steroid cream to use topically  Ok to use benadryl  Update mammo in July  Test for Covid Ab  F/u PRN

## 2020-06-05 NOTE — PROGRESS NOTES
Pt here today with insect bite to left dorsal hand that started a few days ago.  Swelling a little worse  Area is red and itchy  No pain or fever  Has been applying benadryl cream with no improvement    Had respiratory illnesses earlier this year that was hard to shake, had multiple visits. Interested in covid antibody test

## 2020-06-06 LAB — SARS-COV-2 IGG SERPLBLD QL IA.RAPID: NEGATIVE

## 2020-07-13 ENCOUNTER — TELEPHONE (OUTPATIENT)
Dept: INTERNAL MEDICINE | Facility: CLINIC | Age: 70
End: 2020-07-13

## 2020-07-13 NOTE — TELEPHONE ENCOUNTER
Pt states, she rented a cabin at [facility] and no pets are allowed. Didn't know that she couldn't bring her dog. She has her dog and she takes him everywhere because he is a comfort to her. She wants Dr. Gutierrez to write letter stating her dog is an emotional support dog, so she will be allowed to bring her dog. Informed pt that Dr. Gutierrez do not write letters for that. Informed pt about looking into qualifications for getting her dog registered as an emotional support dog. She verbalized understanding.

## 2020-07-13 NOTE — TELEPHONE ENCOUNTER
----- Message from Laura Aaron sent at 7/13/2020 11:26 AM CDT -----  Contact: pt  Patient states she needs to get a letter from dr cha sheehan about her emotional support dog due to no dog at a camp she rented this week please call back at [phone]. If letter can be emailed that would be great [email or web address]

## 2020-09-25 ENCOUNTER — OFFICE VISIT (OUTPATIENT)
Dept: URGENT CARE | Facility: CLINIC | Age: 70
End: 2020-09-25
Payer: MEDICARE

## 2020-09-25 VITALS
RESPIRATION RATE: 20 BRPM | DIASTOLIC BLOOD PRESSURE: 68 MMHG | BODY MASS INDEX: 22.86 KG/M2 | HEART RATE: 100 BPM | OXYGEN SATURATION: 96 % | TEMPERATURE: 98 F | WEIGHT: 125 LBS | SYSTOLIC BLOOD PRESSURE: 137 MMHG
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
## 2020-09-25 PROCEDURE — 99214 PR OFFICE/OUTPT VISIT, EST, LEVL IV, 30-39 MIN: ICD-10-PCS | Mod: S$GLB,,, | Performed by: NURSE PRACTITIONER

## 2020-09-25 PROCEDURE — 99214 OFFICE O/P EST MOD 30 MIN: CPT | Mod: S$GLB,,, | Performed by: NURSE PRACTITIONER

## 2020-09-25 PROCEDURE — 99499 UNLISTED E&M SERVICE: CPT | Mod: S$GLB,,, | Performed by: NURSE PRACTITIONER

## 2020-09-25 PROCEDURE — 99499 RISK ADDL DX/OHS AUDIT: ICD-10-PCS | Mod: S$GLB,,, | Performed by: NURSE PRACTITIONER

## 2020-09-25 RX ORDER — MUPIROCIN 20 MG/G
OINTMENT TOPICAL 2 TIMES DAILY
Qty: 1 TUBE | Refills: 0 | Status: SHIPPED | OUTPATIENT
Start: 2020-09-25 | End: 2020-10-05

## 2020-09-25 NOTE — PATIENT INSTRUCTIONS
Please keep your wound clean and dry.  Wash gently with soap and water and apply antibiotic ointment (bacitracin, neosporin, etc.) over the wound after washing. Please watch for signs of infection including: increased\spreading redness, swelling, pus-like discharge, or a fever greater than 100.4F. If you experience any of these, please contact your Primary Care Doctor or Return to the Urgent Care for a wound check.     If you were prescribed a narcotic or controlled medication, do not drive or operate heavy equipment or machinery while taking these medications.  You must understand that you've received an Urgent Care treatment only and that you may be released before all your medical problems are known or treated. You, the patient, will arrange for follow up care as instructed.  Follow up with your PCP or specialty clinic as directed within 2-5 days if not improved or as needed.  You can call [phone] to schedule an appointment with the appropriate provider.  If your condition worsens we recommend that you receive another evaluation at the emergency room immediately or contact your primary medical clinics after hours call service to discuss your concerns.  Please return here or go to the Emergency Department for any concerns or worsening of condition.        Animal Bite (General)  An animal bite can cause a wound deep enough to break the skin. In such cases, the wound is cleaned and then sometimes closed. If the wound is closed, it may not be closed completely. This is so that fluid can drain if the wound becomes infected. In addition to wound care, a tetanus shot may be given, if needed.    Home care  · Care for the wound as directed. If a dressing was applied to the wound, be sure to change it as directed.  · Wash your hands well with soap and warm water before and after caring for the wound. This helps lower the risk of infection.  · If the wound bleeds, place a clean, soft cloth on the wound. Then firmly  apply pressure until the bleeding stops. This may take up to 5 minutes. Do not release the pressure and look at the wound during this time.  · Most skin wounds heal within 10 days. But an infection can occur even with proper treatment. So be sure to watch the wound for signs of infection (see below). Check the wound as often as directed by your healthcare provider.  · Antibiotics may be prescribed. These help prevent or treat infection. If youre given antibiotics, take them as directed. Also be sure to complete the medicines.  Rabies prevention  Rabies is a virus that can be carried in certain animals. These can include domestic animals such as dogs and cats. Wild animals such as skunks, raccoons, foxes, and bats can also carry rabies. Pets fully vaccinated against rabies (2 shots) are at very low risk of infection. But because human rabies is almost always fatal, any biting pet should be confined for 10 days as an extra precaution. In general, if there is a risk for rabies, the following steps may need to be taken:  · If someones pet dog or cat has bitten you, it should be kept in a secure area for the next 10 days to watch for signs of illness. (If the pet owner wont allow this, contact your local animal control center.) If the dog or cat becomes ill or dies during that time, contact your local animal control center at once so the animal may be tested for rabies. If the pet stays healthy for the next 10 days, there is no danger of rabies in the animal or you.  · If a stray pet bit you, contact your local animal control center. They can give information on capture, quarantine, and animal rabies testing.  · If you cant find the animal that bit you in the next 2 days, and if rabies exists in your region, you may need to receive the rabies vaccine series. Call your healthcare provider right away. Or return to the emergency department promptly.  · All animal bites should be reported to the local animal control  [place]. If you were not given a form to fill out, you can report this yourself.  Follow-up care  Follow up with your healthcare provider, or as directed.  When to seek medical advice  Call your healthcare provider right away if any of these occur:  · Signs of infection:  ¨ Spreading redness or warmth from the wound  ¨ Increased pain or swelling  ¨ Fever of 100.4ºF (38ºC) or higher, or as directed by your healthcare provider  ¨ Colored fluid or pus draining from the wound  · Signs of rabies infection:  ¨ Headache  ¨ Confusion  ¨ Strange behavior  ¨ Increased salivating and drooling  ¨ Seizure  · Decreased ability to move any body part near the bite area  · Bleeding that can't be stopped after 5 minutes of firm pressure  Date Last Reviewed: 3/1/2017  © 0400-6226 [employer]. [address] [place] [address], [place] [address]. All rights reserved. This information is not intended as a substitute for professional medical care. Always follow your healthcare professional's instructions.

## 2020-09-25 NOTE — PROGRESS NOTES
Subjective:       Patient ID: Emmy Padilla is a 70 y.o. female.    Vitals:  weight is 56.7 kg (125 lb). Her temperature is 98 °F (36.7 °C). Her blood pressure is 137/68 and her pulse is 100. Her respiration is 20 and oxygen saturation is 96%.     Chief Complaint: Abrasion    70 yr old female presents to the Urgent Care with concerned possible infection from dog scratch on Monday. Patient reports keeping wound clean and dry with Bactroban applied daily. Patient will be having steroid injection done for rheumatoid arthritis and don't want to be on antibiotics prior to treatment. Patient denies any pus drainage. Patient also reports improvement in wound since initial incident.     Animal Bite   Episode onset: x 4 days ago. The incident occurred at home. She came to the ER via personal transport. There is an injury to the right forearm. The patient is experiencing no pain. It is unlikely that a foreign body is present. Pertinent negatives include no chest pain, no fussiness, no visual disturbance, no abdominal pain, no bladder incontinence, no headaches, no hearing loss, no inability to bear weight, no focal weakness, no decreased responsiveness, no light-headedness, no loss of consciousness, no seizures, no weakness, no cough, no difficulty breathing and no memory loss. There have been no prior injuries to these areas. She is right-handed. Her tetanus status is UTD. She has been behaving normally. She has received no recent medical care.       Constitution: Negative for chills, sweating, fatigue and fever.   HENT: Negative for hearing loss, sinus pressure, sore throat, trouble swallowing and voice change.    Cardiovascular: Negative for chest pain.   Eyes: Negative for eye pain.   Respiratory: Negative for cough, shortness of breath and asthma.    Gastrointestinal: Negative for abdominal pain.   Genitourinary: Negative for bladder incontinence.   Musculoskeletal: Negative for pain, joint pain and muscle ache.    Skin: Positive for wound. Negative for pale, rash, abrasion and erythema.   Allergic/Immunologic: Negative for asthma.   Neurological: Negative for light-headedness, focal weakness, headaches, disorientation, altered mental status, loss of consciousness and seizures.   Psychiatric/Behavioral: Negative for altered mental status, disorientation and confusion.       Objective:      Physical Exam   Constitutional: She is oriented to person, place, and time. She appears well-developed. She is cooperative.  Non-toxic appearance. She does not appear ill.   HENT:   Head: Normocephalic and atraumatic. Head is without abrasion, without contusion and without laceration.   Ears:   Right Ear: Abnromal external ear normal.   Left Ear: Abnormal external ear normal.   Nose: Nose abnormal.   Mouth/Throat: Oropharynx is clear and moist and mucous membranes are normal.   Eyes: Pupils are equal, round, and reactive to light. Conjunctivae, EOM and lids are normal.   Neck: Trachea normal, full passive range of motion without pain and phonation normal. Neck supple.   Cardiovascular: Normal rate, regular rhythm and normal heart sounds.   Pulses:       Radial pulses are 2+ on the right side and 2+ on the left side.   Pulmonary/Chest: Effort normal and breath sounds normal. No stridor. No respiratory distress.   Abdominal: Normal appearance.   Musculoskeletal: Normal range of motion.      Right forearm: She exhibits laceration.   Neurological: She is alert and oriented to person, place, and time.   Skin: Skin is warm, dry, intact, not diaphoretic and no rash. Capillary refill takes less than 2 seconds. Lacerations - upper ext.:  right forearmabrasion, burn, bruising, erythema and ecchymosisPsychiatric: Her speech is normal and behavior is normal. Judgment and thought content normal.   Nursing note and vitals reviewed.            Assessment:       1. Dog scratch        Plan:       Patient dog scratched arm x 4 days ago. Dog UTD on  vaccinations. Wound noted to be healing appropriately. See photo uploaded above. No indication for suturing. No evidence of infection noted to warrant oral abx. Return precautions discussed. Patient verbalized understanding.     Dog scratch  -     mupirocin (BACTROBAN) 2 % ointment; Apply topically 2 (two) times daily. for 10 days  Dispense: 1 Tube; Refill: 0      Patient Instructions   Please keep your wound clean and dry.  Wash gently with soap and water and apply antibiotic ointment (bacitracin, neosporin, etc.) over the wound after washing. Please watch for signs of infection including: increased\spreading redness, swelling, pus-like discharge, or a fever greater than 100.4F. If you experience any of these, please contact your Primary Care Doctor or Return to the Urgent Care for a wound check.     If you were prescribed a narcotic or controlled medication, do not drive or operate heavy equipment or machinery while taking these medications.  You must understand that you've received an Urgent Care treatment only and that you may be released before all your medical problems are known or treated. You, the patient, will arrange for follow up care as instructed.  Follow up with your PCP or specialty clinic as directed within 2-5 days if not improved or as needed.  You can call [phone] to schedule an appointment with the appropriate provider.  If your condition worsens we recommend that you receive another evaluation at the emergency room immediately or contact your primary medical clinics after hours call service to discuss your concerns.  Please return here or go to the Emergency Department for any concerns or worsening of condition.        Animal Bite (General)  An animal bite can cause a wound deep enough to break the skin. In such cases, the wound is cleaned and then sometimes closed. If the wound is closed, it may not be closed completely. This is so that fluid can drain if the wound becomes infected. In  addition to wound care, a tetanus shot may be given, if needed.    Home care  · Care for the wound as directed. If a dressing was applied to the wound, be sure to change it as directed.  · Wash your hands well with soap and warm water before and after caring for the wound. This helps lower the risk of infection.  · If the wound bleeds, place a clean, soft cloth on the wound. Then firmly apply pressure until the bleeding stops. This may take up to 5 minutes. Do not release the pressure and look at the wound during this time.  · Most skin wounds heal within 10 days. But an infection can occur even with proper treatment. So be sure to watch the wound for signs of infection (see below). Check the wound as often as directed by your healthcare provider.  · Antibiotics may be prescribed. These help prevent or treat infection. If youre given antibiotics, take them as directed. Also be sure to complete the medicines.  Rabies prevention  Rabies is a virus that can be carried in certain animals. These can include domestic animals such as dogs and cats. Wild animals such as skunks, raccoons, foxes, and bats can also carry rabies. Pets fully vaccinated against rabies (2 shots) are at very low risk of infection. But because human rabies is almost always fatal, any biting pet should be confined for 10 days as an extra precaution. In general, if there is a risk for rabies, the following steps may need to be taken:  · If someones pet dog or cat has bitten you, it should be kept in a secure area for the next 10 days to watch for signs of illness. (If the pet owner wont allow this, contact your local animal control center.) If the dog or cat becomes ill or dies during that time, contact your local animal control center at once so the animal may be tested for rabies. If the pet stays healthy for the next 10 days, there is no danger of rabies in the animal or you.  · If a stray pet bit you, contact your local animal control center.  They can give information on capture, quarantine, and animal rabies testing.  · If you cant find the animal that bit you in the next 2 days, and if rabies exists in your region, you may need to receive the rabies vaccine series. Call your healthcare provider right away. Or return to the emergency department promptly.  · All animal bites should be reported to the local animal control center. If you were not given a form to fill out, you can report this yourself.  Follow-up care  Follow up with your healthcare provider, or as directed.  When to seek medical advice  Call your healthcare provider right away if any of these occur:  · Signs of infection:  ¨ Spreading redness or warmth from the wound  ¨ Increased pain or swelling  ¨ Fever of 100.4ºF (38ºC) or higher, or as directed by your healthcare provider  ¨ Colored fluid or pus draining from the wound  · Signs of rabies infection:  ¨ Headache  ¨ Confusion  ¨ Strange behavior  ¨ Increased salivating and drooling  ¨ Seizure  · Decreased ability to move any body part near the bite area  · Bleeding that can't be stopped after 5 minutes of firm pressure  Date Last Reviewed: 3/1/2017  © 7989-2655 [employer]. [address] [place] [address], [place] [address]. All rights reserved. This information is not intended as a substitute for professional medical care. Always follow your healthcare professional's instructions.

## 2020-09-27 ENCOUNTER — TELEPHONE (OUTPATIENT)
Dept: URGENT CARE | Facility: CLINIC | Age: 70
End: 2020-09-27

## 2020-10-13 ENCOUNTER — TELEPHONE (OUTPATIENT)
Dept: INTERNAL MEDICINE | Facility: CLINIC | Age: 70
End: 2020-10-13

## 2020-10-13 DIAGNOSIS — E55.9 VITAMIN D DEFICIENCY: ICD-10-CM

## 2020-10-13 DIAGNOSIS — R73.09 ABNORMAL GLUCOSE: ICD-10-CM

## 2020-10-13 DIAGNOSIS — Z00.00 ROUTINE GENERAL MEDICAL EXAMINATION AT A HEALTH CARE FACILITY: Primary | ICD-10-CM

## 2020-10-13 DIAGNOSIS — E78.5 DYSLIPIDEMIA: ICD-10-CM

## 2020-10-13 NOTE — TELEPHONE ENCOUNTER
----- Message from Ariana Larson sent at 10/13/2020 12:11 PM CDT -----  Contact: pt  Is calling to have labs placed in the system for her appt on 11/25 and can be reached at [phone]//thanks/dbw

## 2020-10-13 NOTE — TELEPHONE ENCOUNTER
Spoke with pt, appts scheduled. Pt wants to know if she should get her flu shot before she leaves to go out of town on 10/24? She said, she was told it was better to wait until later to get it.

## 2020-10-13 NOTE — TELEPHONE ENCOUNTER
----- Message from Ariana Larson sent at 10/13/2020 12:05 PM CDT -----  Contact: Pt  Type:  RX Refill Request    Who Called: pt   Refill or New Rx:refill   RX Name and Strength: singulair 10 mg ( generic)  How is the patient currently taking it? (ex. 1XDay) once daily   Is this a 30 day or 90 day RX: 90 days   [facility]   Local or Mail Order:local   Ordering Provider:cha sheehan   Would the patient rather a call back or a response via [facility]? phone   Best Call Back Number: [phone]   Additional Information:      [facility]/pharm[facility] #[id number] - [place] - [address] [facility]  [id number] [place] [id number]  Phone: [phone] Fax: [phone]

## 2020-10-14 ENCOUNTER — IMMUNIZATION (OUTPATIENT)
Dept: INTERNAL MEDICINE | Facility: CLINIC | Age: 70
End: 2020-10-14
Payer: MEDICARE

## 2020-10-14 PROCEDURE — 90694 VACC AIIV4 NO PRSRV 0.5ML IM: CPT | Mod: HCNC,S$GLB,, | Performed by: FAMILY MEDICINE

## 2020-10-14 PROCEDURE — G0008 FLU VACCINE - QUADRIVALENT - ADJUVANTED: ICD-10-PCS | Mod: HCNC,S$GLB,, | Performed by: FAMILY MEDICINE

## 2020-10-14 PROCEDURE — 90694 FLU VACCINE - QUADRIVALENT - ADJUVANTED: ICD-10-PCS | Mod: HCNC,S$GLB,, | Performed by: FAMILY MEDICINE

## 2020-10-14 PROCEDURE — G0008 ADMIN INFLUENZA VIRUS VAC: HCPCS | Mod: HCNC,S$GLB,, | Performed by: FAMILY MEDICINE

## 2020-10-14 RX ORDER — MONTELUKAST SODIUM 10 MG/1
10 TABLET ORAL NIGHTLY
Qty: 30 TABLET | Refills: 6 | Status: SHIPPED | OUTPATIENT
Start: 2020-10-14 | End: 2021-03-08

## 2020-12-22 ENCOUNTER — TELEPHONE (OUTPATIENT)
Dept: INTERNAL MEDICINE | Facility: CLINIC | Age: 70
End: 2020-12-22

## 2020-12-22 ENCOUNTER — OFFICE VISIT (OUTPATIENT)
Dept: URGENT CARE | Facility: CLINIC | Age: 70
End: 2020-12-22
Payer: MEDICARE

## 2020-12-22 VITALS
SYSTOLIC BLOOD PRESSURE: 141 MMHG | TEMPERATURE: 98 F | OXYGEN SATURATION: 99 % | DIASTOLIC BLOOD PRESSURE: 71 MMHG | BODY MASS INDEX: 23 KG/M2 | RESPIRATION RATE: 18 BRPM | HEIGHT: 62 IN | WEIGHT: 125 LBS | HEART RATE: 78 BPM

## 2020-12-22 DIAGNOSIS — R09.82 ALLERGIC RHINITIS WITH POSTNASAL DRIP: Primary | ICD-10-CM

## 2020-12-22 DIAGNOSIS — J30.9 ALLERGIC RHINITIS WITH POSTNASAL DRIP: Primary | ICD-10-CM

## 2020-12-22 DIAGNOSIS — H65.93 OTITIS MEDIA WITH EFFUSION, BILATERAL: ICD-10-CM

## 2020-12-22 PROCEDURE — 1125F PR PAIN SEVERITY QUANTIFIED, PAIN PRESENT: ICD-10-PCS | Mod: S$GLB,,, | Performed by: NURSE PRACTITIONER

## 2020-12-22 PROCEDURE — 99213 OFFICE O/P EST LOW 20 MIN: CPT | Mod: S$GLB,,, | Performed by: NURSE PRACTITIONER

## 2020-12-22 PROCEDURE — 99213 PR OFFICE/OUTPT VISIT, EST, LEVL III, 20-29 MIN: ICD-10-PCS | Mod: S$GLB,,, | Performed by: NURSE PRACTITIONER

## 2020-12-22 PROCEDURE — 1125F AMNT PAIN NOTED PAIN PRSNT: CPT | Mod: S$GLB,,, | Performed by: NURSE PRACTITIONER

## 2020-12-22 PROCEDURE — 3008F PR BODY MASS INDEX (BMI) DOCUMENTED: ICD-10-PCS | Mod: CPTII,S$GLB,, | Performed by: NURSE PRACTITIONER

## 2020-12-22 PROCEDURE — 3008F BODY MASS INDEX DOCD: CPT | Mod: CPTII,S$GLB,, | Performed by: NURSE PRACTITIONER

## 2020-12-22 NOTE — PROGRESS NOTES
"Subjective:       Patient ID: Emmy Padilla is a 70 y.o. female.    Vitals:  height is 5' 2" (1.575 m) and weight is 56.7 kg (125 lb). Her tympanic temperature is 98.2 °F (36.8 °C). Her blood pressure is 141/71 (abnormal) and her pulse is 78. Her respiration is 18 and oxygen saturation is 99%.     Chief Complaint: Sinus Problem    Left ear hurting x 3 days and some sinus pressure felt on left side of face. Pt uses flonase daily.    Sinus Problem  This is a new problem. The current episode started 1 to 4 weeks ago (1-2 weeks). The problem has been gradually worsening since onset. There has been no fever. Associated symptoms include ear pain, sinus pressure (left side), sneezing and a sore throat. Pertinent negatives include no congestion, coughing, headaches or shortness of breath. Treatments tried: flonase. The treatment provided mild relief.       Constitution: Negative.   HENT: Positive for ear pain, postnasal drip, sinus pressure (left side) and sore throat. Negative for congestion.    Eyes: Negative.    Respiratory: Negative for cough and shortness of breath.    Genitourinary: Negative.    Musculoskeletal: Negative.    Skin: Negative.    Allergic/Immunologic: Positive for sneezing.   Neurological: Positive for light-headedness. Negative for headaches.       Objective:      Physical Exam   Constitutional: She is oriented to person, place, and time. She appears well-developed. She is cooperative.  Non-toxic appearance. She does not appear ill. No distress.   HENT:   Head: Normocephalic and atraumatic.   Ears:   Right Ear: Hearing, external ear and ear canal normal. Tympanic membrane is not erythematous and not bulging. A middle ear effusion is present.   Left Ear: Hearing, external ear and ear canal normal. Tympanic membrane is not erythematous and not bulging. A middle ear effusion is present.   Nose: Nose normal. No mucosal edema, rhinorrhea or nasal deformity. No epistaxis. Right sinus exhibits no " maxillary sinus tenderness and no frontal sinus tenderness. Left sinus exhibits no maxillary sinus tenderness and no frontal sinus tenderness.   Mouth/Throat: Uvula is midline, oropharynx is clear and moist and mucous membranes are normal. No trismus in the jaw. Normal dentition. No uvula swelling. Cobblestoning present. No oropharyngeal exudate, posterior oropharyngeal edema or posterior oropharyngeal erythema.   Throat clearing during exam      Comments: Throat clearing during exam  Eyes: Conjunctivae and lids are normal. No scleral icterus.   Neck: Trachea normal, full passive range of motion without pain and phonation normal. Neck supple. No neck rigidity. No edema and no erythema present.   Cardiovascular: Normal rate, regular rhythm, normal heart sounds and normal pulses.   Pulmonary/Chest: Effort normal and breath sounds normal. No respiratory distress. She has no decreased breath sounds. She has no rhonchi.   Abdominal: Normal appearance.   Musculoskeletal: Normal range of motion.         General: No deformity.   Neurological: She is alert and oriented to person, place, and time. She exhibits normal muscle tone. Coordination normal.   Skin: Skin is warm, dry, intact, not diaphoretic and not pale. Psychiatric: Her speech is normal and behavior is normal. Judgment and thought content normal.   Nursing note and vitals reviewed.        Assessment:       1. Allergic rhinitis with postnasal drip    2. Otitis media with effusion, bilateral        Plan:         Allergic rhinitis with postnasal drip    Otitis media with effusion, bilateral         MDM: Pt has a history of rheumatoid arthritis and has 20 mg prednisone tabs at home.    · Increase fluids.   · May apply warm compresses as needed for facial pain and congestion.   · Saline nasal spray to loosen nasal congestion.  · Continue using Flonase to reduce inflammation in the sinus cavities.  · You may take an over the counter 24 hour antihistamine such as Zyrtec  of Claritin for allergy symptoms such as sneezing, itchy/watery eyes, scratchy throat, or congestion.  · Take 20 mg Prednisone daily for 4-5 days starting tomorrow.  · Warm salt water gargles, Cepacol throat lozenges, or Chloraseptic spray for sore throat.  · Take Tylenol or Ibuprofen as needed for sore throat, body aches, or fever.  · Follow up with your primary care provider if symptoms persist >10 days or sooner for any new or worsening symptoms.   · Go to the ER for any fever that does not improve with Tylenol/Ibuprofen, neck stiffness, rash, severe headache, vision changes, shortness of breath, chest pain, facial swelling, severe facial pain, or any other new and concerning symptoms.

## 2020-12-22 NOTE — PATIENT INSTRUCTIONS
· Increase fluids.   · May apply warm compresses as needed for facial pain and congestion.   · Saline nasal spray to loosen nasal congestion.  · Continue using Flonase to reduce inflammation in the sinus cavities.  · You may take an over the counter 24 hour antihistamine such as Zyrtec of Claritin for allergy symptoms such as sneezing, itchy/watery eyes, scratchy throat, or congestion.  · Take 20 mg Prednisone daily for 4-5 days starting tomorrow.  · Warm salt water gargles, Cepacol throat lozenges, or Chloraseptic spray for sore throat.  · Take Tylenol or Ibuprofen as needed for sore throat, body aches, or fever.  · Follow up with your primary care provider if symptoms persist >10 days or sooner for any new or worsening symptoms.   · Go to the ER for any fever that does not improve with Tylenol/Ibuprofen, neck stiffness, rash, severe headache, vision changes, shortness of breath, chest pain, facial swelling, severe facial pain, or any other new and concerning symptoms.

## 2020-12-22 NOTE — TELEPHONE ENCOUNTER
----- Message from Annika Moncada sent at 12/22/2020  9:40 AM CST -----  Contact: Emmy  Type:  Needs Medical Advice    Who Called:  Emmy   Symptoms (please be specific): sinus congestion/ fluid in ear/ sore throat   How long has patient had these symptoms: 3 day   Pharmacy name and phone #:   [id number] [place] - [address] [facility]  [id number] [facility]  [place] [address]  Phone: [phone] Fax: [phone]  Would the patient rather a call back or a response via [facility]? Call   Best Call Back Number: [phone]  Additional Information: pt would like something called in for her       Thanks,Pb

## 2020-12-22 NOTE — TELEPHONE ENCOUNTER
Called pt and let her know that  DR. Gutierrez is out of clinic for 1 1/2 weeks.  Can see UC for this .  She said that is what she will do.

## 2020-12-29 ENCOUNTER — LAB VISIT (OUTPATIENT)
Dept: LAB | Facility: HOSPITAL | Age: 70
End: 2020-12-29
Attending: FAMILY MEDICINE
Payer: MEDICARE

## 2020-12-29 DIAGNOSIS — E55.9 VITAMIN D DEFICIENCY: ICD-10-CM

## 2020-12-29 DIAGNOSIS — R73.09 ABNORMAL GLUCOSE: ICD-10-CM

## 2020-12-29 DIAGNOSIS — E78.5 DYSLIPIDEMIA: ICD-10-CM

## 2020-12-29 DIAGNOSIS — Z00.00 ROUTINE GENERAL MEDICAL EXAMINATION AT A HEALTH CARE FACILITY: ICD-10-CM

## 2020-12-29 LAB
25(OH)D3+25(OH)D2 SERPL-MCNC: 33 NG/ML (ref 30–96)
ALBUMIN SERPL BCP-MCNC: 3.3 G/DL (ref 3.5–5.2)
ALP SERPL-CCNC: 105 U/L (ref 55–135)
ALT SERPL W/O P-5'-P-CCNC: 12 U/L (ref 10–44)
ANION GAP SERPL CALC-SCNC: 6 MMOL/L (ref 8–16)
AST SERPL-CCNC: 14 U/L (ref 10–40)
BASOPHILS # BLD AUTO: 0.07 K/UL (ref 0–0.2)
BASOPHILS NFR BLD: 1.2 % (ref 0–1.9)
BILIRUB SERPL-MCNC: 0.3 MG/DL (ref 0.1–1)
BUN SERPL-MCNC: 25 MG/DL (ref 8–23)
CALCIUM SERPL-MCNC: 8.9 MG/DL (ref 8.7–10.5)
CHLORIDE SERPL-SCNC: 104 MMOL/L (ref 95–110)
CHOLEST SERPL-MCNC: 215 MG/DL (ref 120–199)
CHOLEST/HDLC SERPL: 3.7 {RATIO} (ref 2–5)
CO2 SERPL-SCNC: 29 MMOL/L (ref 23–29)
CREAT SERPL-MCNC: 0.7 MG/DL (ref 0.5–1.4)
DIFFERENTIAL METHOD: ABNORMAL
EOSINOPHIL # BLD AUTO: 0.2 K/UL (ref 0–0.5)
EOSINOPHIL NFR BLD: 2.7 % (ref 0–8)
ERYTHROCYTE [DISTWIDTH] IN BLOOD BY AUTOMATED COUNT: 13.7 % (ref 11.5–14.5)
EST. GFR  (AFRICAN AMERICAN): >60 ML/MIN/1.73 M^2
EST. GFR  (NON AFRICAN AMERICAN): >60 ML/MIN/1.73 M^2
ESTIMATED AVG GLUCOSE: 108 MG/DL (ref 68–131)
GLUCOSE SERPL-MCNC: 97 MG/DL (ref 70–110)
HBA1C MFR BLD HPLC: 5.4 % (ref 4–5.6)
HCT VFR BLD AUTO: 38.4 % (ref 37–48.5)
HDLC SERPL-MCNC: 58 MG/DL (ref 40–75)
HDLC SERPL: 27 % (ref 20–50)
HGB BLD-MCNC: 11.9 G/DL (ref 12–16)
IMM GRANULOCYTES # BLD AUTO: 0.03 K/UL (ref 0–0.04)
IMM GRANULOCYTES NFR BLD AUTO: 0.5 % (ref 0–0.5)
LDLC SERPL CALC-MCNC: 138.2 MG/DL (ref 63–159)
LYMPHOCYTES # BLD AUTO: 2.2 K/UL (ref 1–4.8)
LYMPHOCYTES NFR BLD: 36.4 % (ref 18–48)
MCH RBC QN AUTO: 29.2 PG (ref 27–31)
MCHC RBC AUTO-ENTMCNC: 31 G/DL (ref 32–36)
MCV RBC AUTO: 94 FL (ref 82–98)
MONOCYTES # BLD AUTO: 0.5 K/UL (ref 0.3–1)
MONOCYTES NFR BLD: 9 % (ref 4–15)
NEUTROPHILS # BLD AUTO: 3 K/UL (ref 1.8–7.7)
NEUTROPHILS NFR BLD: 50.2 % (ref 38–73)
NONHDLC SERPL-MCNC: 157 MG/DL
NRBC BLD-RTO: 0 /100 WBC
PLATELET # BLD AUTO: 328 K/UL (ref 150–350)
PMV BLD AUTO: 11.1 FL (ref 9.2–12.9)
POTASSIUM SERPL-SCNC: 4.1 MMOL/L (ref 3.5–5.1)
PROT SERPL-MCNC: 6.6 G/DL (ref 6–8.4)
RBC # BLD AUTO: 4.08 M/UL (ref 4–5.4)
SODIUM SERPL-SCNC: 139 MMOL/L (ref 136–145)
TRIGL SERPL-MCNC: 94 MG/DL (ref 30–150)
TSH SERPL DL<=0.005 MIU/L-ACNC: 1.75 UIU/ML (ref 0.4–4)
WBC # BLD AUTO: 6.01 K/UL (ref 3.9–12.7)

## 2020-12-29 PROCEDURE — 84443 ASSAY THYROID STIM HORMONE: CPT | Mod: HCNC

## 2020-12-29 PROCEDURE — 80053 COMPREHEN METABOLIC PANEL: CPT | Mod: HCNC

## 2020-12-29 PROCEDURE — 85025 COMPLETE CBC W/AUTO DIFF WBC: CPT | Mod: HCNC

## 2020-12-29 PROCEDURE — 80061 LIPID PANEL: CPT | Mod: HCNC

## 2020-12-29 PROCEDURE — 83036 HEMOGLOBIN GLYCOSYLATED A1C: CPT | Mod: HCNC

## 2020-12-29 PROCEDURE — 36415 COLL VENOUS BLD VENIPUNCTURE: CPT | Mod: HCNC,PO

## 2020-12-29 PROCEDURE — 82306 VITAMIN D 25 HYDROXY: CPT | Mod: HCNC

## 2020-12-31 ENCOUNTER — OFFICE VISIT (OUTPATIENT)
Dept: INTERNAL MEDICINE | Facility: CLINIC | Age: 70
End: 2020-12-31
Payer: MEDICARE

## 2020-12-31 VITALS
DIASTOLIC BLOOD PRESSURE: 60 MMHG | SYSTOLIC BLOOD PRESSURE: 130 MMHG | WEIGHT: 130.5 LBS | HEART RATE: 72 BPM | TEMPERATURE: 98 F | BODY MASS INDEX: 24.01 KG/M2 | HEIGHT: 62 IN

## 2020-12-31 DIAGNOSIS — H66.92 OTITIS OF LEFT EAR: ICD-10-CM

## 2020-12-31 DIAGNOSIS — D84.9 IMMUNOSUPPRESSED STATUS: ICD-10-CM

## 2020-12-31 DIAGNOSIS — Z00.00 ANNUAL PHYSICAL EXAM: Primary | ICD-10-CM

## 2020-12-31 PROCEDURE — 99499 UNLISTED E&M SERVICE: CPT | Mod: S$GLB,,, | Performed by: FAMILY MEDICINE

## 2020-12-31 PROCEDURE — 3288F PR FALLS RISK ASSESSMENT DOCUMENTED: ICD-10-PCS | Mod: HCNC,CPTII,S$GLB, | Performed by: FAMILY MEDICINE

## 2020-12-31 PROCEDURE — 1125F AMNT PAIN NOTED PAIN PRSNT: CPT | Mod: HCNC,S$GLB,, | Performed by: FAMILY MEDICINE

## 2020-12-31 PROCEDURE — 3008F PR BODY MASS INDEX (BMI) DOCUMENTED: ICD-10-PCS | Mod: HCNC,CPTII,S$GLB, | Performed by: FAMILY MEDICINE

## 2020-12-31 PROCEDURE — 99397 PR PREVENTIVE VISIT,EST,65 & OVER: ICD-10-PCS | Mod: HCNC,S$GLB,, | Performed by: FAMILY MEDICINE

## 2020-12-31 PROCEDURE — 99999 PR PBB SHADOW E&M-EST. PATIENT-LVL IV: ICD-10-PCS | Mod: PBBFAC,HCNC,, | Performed by: FAMILY MEDICINE

## 2020-12-31 PROCEDURE — 99999 PR PBB SHADOW E&M-EST. PATIENT-LVL IV: CPT | Mod: PBBFAC,HCNC,, | Performed by: FAMILY MEDICINE

## 2020-12-31 PROCEDURE — 99499 RISK ADDL DX/OHS AUDIT: ICD-10-PCS | Mod: S$GLB,,, | Performed by: FAMILY MEDICINE

## 2020-12-31 PROCEDURE — 3008F BODY MASS INDEX DOCD: CPT | Mod: HCNC,CPTII,S$GLB, | Performed by: FAMILY MEDICINE

## 2020-12-31 PROCEDURE — 3288F FALL RISK ASSESSMENT DOCD: CPT | Mod: HCNC,CPTII,S$GLB, | Performed by: FAMILY MEDICINE

## 2020-12-31 PROCEDURE — 99397 PER PM REEVAL EST PAT 65+ YR: CPT | Mod: HCNC,S$GLB,, | Performed by: FAMILY MEDICINE

## 2020-12-31 PROCEDURE — 1101F PT FALLS ASSESS-DOCD LE1/YR: CPT | Mod: HCNC,CPTII,S$GLB, | Performed by: FAMILY MEDICINE

## 2020-12-31 PROCEDURE — 1125F PR PAIN SEVERITY QUANTIFIED, PAIN PRESENT: ICD-10-PCS | Mod: HCNC,S$GLB,, | Performed by: FAMILY MEDICINE

## 2020-12-31 PROCEDURE — 1101F PR PT FALLS ASSESS DOC 0-1 FALLS W/OUT INJ PAST YR: ICD-10-PCS | Mod: HCNC,CPTII,S$GLB, | Performed by: FAMILY MEDICINE

## 2020-12-31 RX ORDER — AMOXICILLIN AND CLAVULANATE POTASSIUM 875; 125 MG/1; MG/1
1 TABLET, FILM COATED ORAL EVERY 12 HOURS
Qty: 20 TABLET | Refills: 0 | Status: SHIPPED | OUTPATIENT
Start: 2020-12-31 | End: 2021-01-10

## 2020-12-31 NOTE — PROGRESS NOTES
"Subjective:      Patient ID: Emmy Padilla is a 70 y.o. female.    Chief Complaint: Annual Exam    HPI  71 yo with RA/immunocompromised, osteoporosis, depression here for annual review.  Followed by specialty  Mood ok//Covid has her down some due to lack of socialization.  Having increased pain at the L ear//PND/sinus junk.  No fever.  Has been following Covid protocol//staying away from people.  No contacts that she is aware of.  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    PMR (polymyalgia rheumatica)     resolved    Rheumatoid arthritis 12/2014    Dr. Traore     Family History   Problem Relation Age of Onset    Cancer Father         Esophageal    Cancer Mother     Cancer Brother 60        colon CA    Hypertension Brother     Hyperlipidemia Brother     Cancer Maternal Grandmother 92        breast CA     Past Surgical History:   Procedure Laterality Date    CONDYLOMA EXCISION/FULGURATION  2003    Anal    HAND SURGERY Right     middle finger trigger and swaying     nodules removed from rectal area      RETINAL DETACHMENT SURGERY Right 8/2106    RETINAL DETACHMENT SURGERY       Social History     Tobacco Use    Smoking status: Current Every Day Smoker     Packs/day: 0.50     Types: Cigarettes    Smokeless tobacco: Never Used   Substance Use Topics    Alcohol use: No     Alcohol/week: 0.0 standard drinks    Drug use: No       /60 (BP Location: Left arm, Patient Position: Sitting, BP Method: Large (Manual))   Pulse 72   Temp 98.1 °F (36.7 °C) (Temporal)   Ht 5' 2" (1.575 m)   Wt 59.2 kg (130 lb 8.2 oz)   BMI 23.87 kg/m²     Review of Systems   Constitutional: Negative for activity change, appetite change, chills, diaphoresis, fatigue, fever and " unexpected weight change.   HENT: Positive for ear pain and postnasal drip. Negative for hearing loss, rhinorrhea and tinnitus.    Eyes: Negative for visual disturbance.   Respiratory: Positive for cough. Negative for shortness of breath and wheezing.    Cardiovascular: Negative for chest pain, palpitations and leg swelling.   Gastrointestinal: Negative for abdominal distention and abdominal pain.   Genitourinary: Negative for dysuria, frequency, hematuria and urgency.   Musculoskeletal: Negative for back pain and joint swelling.   Neurological: Negative for weakness and headaches.   Hematological: Negative for adenopathy.   Psychiatric/Behavioral: Negative for confusion and decreased concentration.       Objective:     Physical Exam  Vitals signs and nursing note reviewed.   Constitutional:       General: She is not in acute distress.     Appearance: She is well-developed.   HENT:      Right Ear: External ear normal.      Ears:      Comments: L TM with dullness/erythema     Nose: Nose normal.   Eyes:      Conjunctiva/sclera: Conjunctivae normal.      Pupils: Pupils are equal, round, and reactive to light.   Neck:      Musculoskeletal: Normal range of motion and neck supple.      Thyroid: No thyromegaly.      Vascular: No carotid bruit.   Cardiovascular:      Rate and Rhythm: Normal rate and regular rhythm.      Heart sounds: Normal heart sounds.   Pulmonary:      Effort: Pulmonary effort is normal. No respiratory distress.      Breath sounds: Normal breath sounds. No wheezing or rales.   Abdominal:      General: Bowel sounds are normal. There is no distension.      Palpations: Abdomen is soft.      Tenderness: There is no abdominal tenderness. There is no guarding.   Musculoskeletal: Normal range of motion.   Lymphadenopathy:      Cervical: No cervical adenopathy.   Skin:     General: Skin is warm and dry.      Findings: No rash.   Neurological:      Mental Status: She is alert and oriented to person, place, and  time.      Cranial Nerves: No cranial nerve deficit.   Psychiatric:         Behavior: Behavior normal.         Thought Content: Thought content normal.         Judgment: Judgment normal.         Lab Results   Component Value Date    WBC 6.01 12/29/2020    HGB 11.9 (L) 12/29/2020    HCT 38.4 12/29/2020     12/29/2020    CHOL 215 (H) 12/29/2020    TRIG 94 12/29/2020    HDL 58 12/29/2020    ALT 12 12/29/2020    AST 14 12/29/2020     12/29/2020    K 4.1 12/29/2020     12/29/2020    CREATININE 0.7 12/29/2020    BUN 25 (H) 12/29/2020    CO2 29 12/29/2020    TSH 1.746 12/29/2020    HGBA1C 5.4 12/29/2020       Assessment:     1. Annual physical exam    2. Immunosuppressed status    3. Otitis of left ear         Plan:     Annual physical exam    Immunosuppressed status    Otitis of left ear    Other orders  -     amoxicillin-clavulanate 875-125mg (AUGMENTIN) 875-125 mg per tablet; Take 1 tablet by mouth every 12 (twelve) hours. for 10 days  Dispense: 20 tablet; Refill: 0      Vitals stable  Reviewed labs//stable  Start augmentin bid x 10 days//cont flonase/claritin and try some mucinex DM  F/u annually and PRN

## 2021-01-26 ENCOUNTER — TELEPHONE (OUTPATIENT)
Dept: INTERNAL MEDICINE | Facility: CLINIC | Age: 71
End: 2021-01-26

## 2021-01-27 ENCOUNTER — TELEPHONE (OUTPATIENT)
Dept: INTERNAL MEDICINE | Facility: CLINIC | Age: 71
End: 2021-01-27

## 2021-01-27 ENCOUNTER — OFFICE VISIT (OUTPATIENT)
Dept: URGENT CARE | Facility: CLINIC | Age: 71
End: 2021-01-27
Payer: MEDICARE

## 2021-01-27 VITALS
TEMPERATURE: 98 F | BODY MASS INDEX: 23.04 KG/M2 | HEART RATE: 76 BPM | SYSTOLIC BLOOD PRESSURE: 98 MMHG | OXYGEN SATURATION: 99 % | DIASTOLIC BLOOD PRESSURE: 56 MMHG | RESPIRATION RATE: 20 BRPM | WEIGHT: 130 LBS | HEIGHT: 63 IN

## 2021-01-27 DIAGNOSIS — U07.1 COVID-19 VIRUS INFECTION: Primary | ICD-10-CM

## 2021-01-27 DIAGNOSIS — U07.1 COVID-19 VIRUS DETECTED: ICD-10-CM

## 2021-01-27 DIAGNOSIS — R05.9 COUGH: ICD-10-CM

## 2021-01-27 DIAGNOSIS — R19.7 DIARRHEA, UNSPECIFIED TYPE: ICD-10-CM

## 2021-01-27 LAB
CTP QC/QA: YES
SARS-COV-2 RDRP RESP QL NAA+PROBE: POSITIVE

## 2021-01-27 PROCEDURE — 99213 OFFICE O/P EST LOW 20 MIN: CPT | Mod: S$GLB,,, | Performed by: NURSE PRACTITIONER

## 2021-01-27 PROCEDURE — 99213 PR OFFICE/OUTPT VISIT, EST, LEVL III, 20-29 MIN: ICD-10-PCS | Mod: S$GLB,,, | Performed by: NURSE PRACTITIONER

## 2021-01-27 PROCEDURE — 1125F AMNT PAIN NOTED PAIN PRSNT: CPT | Mod: S$GLB,,, | Performed by: NURSE PRACTITIONER

## 2021-01-27 PROCEDURE — U0002: ICD-10-PCS | Mod: QW,S$GLB,, | Performed by: NURSE PRACTITIONER

## 2021-01-27 PROCEDURE — 1125F PR PAIN SEVERITY QUANTIFIED, PAIN PRESENT: ICD-10-PCS | Mod: S$GLB,,, | Performed by: NURSE PRACTITIONER

## 2021-01-27 PROCEDURE — U0002 COVID-19 LAB TEST NON-CDC: HCPCS | Mod: QW,S$GLB,, | Performed by: NURSE PRACTITIONER

## 2021-01-27 PROCEDURE — 3008F BODY MASS INDEX DOCD: CPT | Mod: CPTII,S$GLB,, | Performed by: NURSE PRACTITIONER

## 2021-01-27 PROCEDURE — 3008F PR BODY MASS INDEX (BMI) DOCUMENTED: ICD-10-PCS | Mod: CPTII,S$GLB,, | Performed by: NURSE PRACTITIONER

## 2021-01-28 ENCOUNTER — NURSE TRIAGE (OUTPATIENT)
Dept: ADMINISTRATIVE | Facility: CLINIC | Age: 71
End: 2021-01-28

## 2021-01-28 ENCOUNTER — TELEPHONE (OUTPATIENT)
Dept: INTERNAL MEDICINE | Facility: CLINIC | Age: 71
End: 2021-01-28

## 2021-02-04 ENCOUNTER — NURSE TRIAGE (OUTPATIENT)
Dept: ADMINISTRATIVE | Facility: CLINIC | Age: 71
End: 2021-02-04

## 2021-02-24 ENCOUNTER — TELEPHONE (OUTPATIENT)
Dept: INTERNAL MEDICINE | Facility: CLINIC | Age: 71
End: 2021-02-24

## 2021-03-16 LAB
LEFT EYE DM RETINOPATHY: NEGATIVE
RIGHT EYE DM RETINOPATHY: NEGATIVE

## 2021-03-24 ENCOUNTER — PES CALL (OUTPATIENT)
Dept: ADMINISTRATIVE | Facility: CLINIC | Age: 71
End: 2021-03-24

## 2021-03-25 ENCOUNTER — PATIENT OUTREACH (OUTPATIENT)
Dept: ADMINISTRATIVE | Facility: HOSPITAL | Age: 71
End: 2021-03-25

## 2021-04-28 ENCOUNTER — PATIENT MESSAGE (OUTPATIENT)
Dept: RESEARCH | Facility: HOSPITAL | Age: 71
End: 2021-04-28

## 2021-07-08 ENCOUNTER — PES CALL (OUTPATIENT)
Dept: ADMINISTRATIVE | Facility: CLINIC | Age: 71
End: 2021-07-08

## 2021-08-05 ENCOUNTER — OFFICE VISIT (OUTPATIENT)
Dept: URGENT CARE | Facility: CLINIC | Age: 71
End: 2021-08-05
Payer: MEDICARE

## 2021-08-05 VITALS
RESPIRATION RATE: 20 BRPM | OXYGEN SATURATION: 97 % | HEART RATE: 89 BPM | SYSTOLIC BLOOD PRESSURE: 110 MMHG | TEMPERATURE: 98 F | DIASTOLIC BLOOD PRESSURE: 70 MMHG

## 2021-08-05 DIAGNOSIS — J06.9 UPPER RESPIRATORY TRACT INFECTION, UNSPECIFIED TYPE: Primary | ICD-10-CM

## 2021-08-05 LAB
CTP QC/QA: YES
SARS-COV-2 RDRP RESP QL NAA+PROBE: NEGATIVE

## 2021-08-05 PROCEDURE — U0002 COVID-19 LAB TEST NON-CDC: HCPCS | Mod: QW,S$GLB,, | Performed by: PHYSICIAN ASSISTANT

## 2021-08-05 PROCEDURE — 99213 PR OFFICE/OUTPT VISIT, EST, LEVL III, 20-29 MIN: ICD-10-PCS | Mod: S$GLB,,, | Performed by: PHYSICIAN ASSISTANT

## 2021-08-05 PROCEDURE — 1160F RVW MEDS BY RX/DR IN RCRD: CPT | Mod: CPTII,S$GLB,, | Performed by: PHYSICIAN ASSISTANT

## 2021-08-05 PROCEDURE — 1160F PR REVIEW ALL MEDS BY PRESCRIBER/CLIN PHARMACIST DOCUMENTED: ICD-10-PCS | Mod: CPTII,S$GLB,, | Performed by: PHYSICIAN ASSISTANT

## 2021-08-05 PROCEDURE — 3078F DIAST BP <80 MM HG: CPT | Mod: CPTII,S$GLB,, | Performed by: PHYSICIAN ASSISTANT

## 2021-08-05 PROCEDURE — 3078F PR MOST RECENT DIASTOLIC BLOOD PRESSURE < 80 MM HG: ICD-10-PCS | Mod: CPTII,S$GLB,, | Performed by: PHYSICIAN ASSISTANT

## 2021-08-05 PROCEDURE — 99213 OFFICE O/P EST LOW 20 MIN: CPT | Mod: S$GLB,,, | Performed by: PHYSICIAN ASSISTANT

## 2021-08-05 PROCEDURE — 1159F PR MEDICATION LIST DOCUMENTED IN MEDICAL RECORD: ICD-10-PCS | Mod: CPTII,S$GLB,, | Performed by: PHYSICIAN ASSISTANT

## 2021-08-05 PROCEDURE — U0002: ICD-10-PCS | Mod: QW,S$GLB,, | Performed by: PHYSICIAN ASSISTANT

## 2021-08-05 PROCEDURE — 3074F PR MOST RECENT SYSTOLIC BLOOD PRESSURE < 130 MM HG: ICD-10-PCS | Mod: CPTII,S$GLB,, | Performed by: PHYSICIAN ASSISTANT

## 2021-08-05 PROCEDURE — 1159F MED LIST DOCD IN RCRD: CPT | Mod: CPTII,S$GLB,, | Performed by: PHYSICIAN ASSISTANT

## 2021-08-05 PROCEDURE — 3074F SYST BP LT 130 MM HG: CPT | Mod: CPTII,S$GLB,, | Performed by: PHYSICIAN ASSISTANT

## 2021-08-05 RX ORDER — AMOXICILLIN 500 MG/1
CAPSULE ORAL
COMMUNITY
Start: 2021-05-26 | End: 2021-10-27

## 2021-08-26 ENCOUNTER — PES CALL (OUTPATIENT)
Dept: ADMINISTRATIVE | Facility: CLINIC | Age: 71
End: 2021-08-26

## 2021-09-24 ENCOUNTER — PES CALL (OUTPATIENT)
Dept: ADMINISTRATIVE | Facility: CLINIC | Age: 71
End: 2021-09-24

## 2021-10-26 ENCOUNTER — TELEPHONE (OUTPATIENT)
Dept: ADMINISTRATIVE | Facility: HOSPITAL | Age: 71
End: 2021-10-26
Payer: MEDICARE

## 2021-10-26 ENCOUNTER — OFFICE VISIT (OUTPATIENT)
Dept: URGENT CARE | Facility: CLINIC | Age: 71
End: 2021-10-26
Payer: MEDICARE

## 2021-10-26 VITALS
BODY MASS INDEX: 23.04 KG/M2 | OXYGEN SATURATION: 97 % | SYSTOLIC BLOOD PRESSURE: 119 MMHG | HEART RATE: 95 BPM | RESPIRATION RATE: 16 BRPM | WEIGHT: 130 LBS | TEMPERATURE: 99 F | HEIGHT: 63 IN | DIASTOLIC BLOOD PRESSURE: 64 MMHG

## 2021-10-26 DIAGNOSIS — R05.9 COUGH: ICD-10-CM

## 2021-10-26 DIAGNOSIS — J30.1 SEASONAL ALLERGIC RHINITIS DUE TO POLLEN: Primary | ICD-10-CM

## 2021-10-26 DIAGNOSIS — F17.200 SMOKER: ICD-10-CM

## 2021-10-26 DIAGNOSIS — R09.81 SINUS CONGESTION: ICD-10-CM

## 2021-10-26 LAB
CTP QC/QA: YES
SARS-COV-2 RDRP RESP QL NAA+PROBE: NEGATIVE

## 2021-10-26 PROCEDURE — 99214 PR OFFICE/OUTPT VISIT, EST, LEVL IV, 30-39 MIN: ICD-10-PCS | Mod: S$GLB,CS,, | Performed by: PHYSICIAN ASSISTANT

## 2021-10-26 PROCEDURE — 3074F SYST BP LT 130 MM HG: CPT | Mod: CPTII,S$GLB,, | Performed by: PHYSICIAN ASSISTANT

## 2021-10-26 PROCEDURE — 1126F AMNT PAIN NOTED NONE PRSNT: CPT | Mod: CPTII,S$GLB,, | Performed by: PHYSICIAN ASSISTANT

## 2021-10-26 PROCEDURE — 1159F MED LIST DOCD IN RCRD: CPT | Mod: CPTII,S$GLB,, | Performed by: PHYSICIAN ASSISTANT

## 2021-10-26 PROCEDURE — 1159F PR MEDICATION LIST DOCUMENTED IN MEDICAL RECORD: ICD-10-PCS | Mod: CPTII,S$GLB,, | Performed by: PHYSICIAN ASSISTANT

## 2021-10-26 PROCEDURE — 99499 RISK ADDL DX/OHS AUDIT: ICD-10-PCS | Mod: S$GLB,,, | Performed by: PHYSICIAN ASSISTANT

## 2021-10-26 PROCEDURE — 1160F PR REVIEW ALL MEDS BY PRESCRIBER/CLIN PHARMACIST DOCUMENTED: ICD-10-PCS | Mod: CPTII,S$GLB,, | Performed by: PHYSICIAN ASSISTANT

## 2021-10-26 PROCEDURE — 3078F DIAST BP <80 MM HG: CPT | Mod: CPTII,S$GLB,, | Performed by: PHYSICIAN ASSISTANT

## 2021-10-26 PROCEDURE — 99499 UNLISTED E&M SERVICE: CPT | Mod: S$GLB,,, | Performed by: PHYSICIAN ASSISTANT

## 2021-10-26 PROCEDURE — U0002 COVID-19 LAB TEST NON-CDC: HCPCS | Mod: QW,S$GLB,, | Performed by: PHYSICIAN ASSISTANT

## 2021-10-26 PROCEDURE — 3008F BODY MASS INDEX DOCD: CPT | Mod: CPTII,S$GLB,, | Performed by: PHYSICIAN ASSISTANT

## 2021-10-26 PROCEDURE — 1126F PR PAIN SEVERITY QUANTIFIED, NO PAIN PRESENT: ICD-10-PCS | Mod: CPTII,S$GLB,, | Performed by: PHYSICIAN ASSISTANT

## 2021-10-26 PROCEDURE — U0002: ICD-10-PCS | Mod: QW,S$GLB,, | Performed by: PHYSICIAN ASSISTANT

## 2021-10-26 PROCEDURE — 3008F PR BODY MASS INDEX (BMI) DOCUMENTED: ICD-10-PCS | Mod: CPTII,S$GLB,, | Performed by: PHYSICIAN ASSISTANT

## 2021-10-26 PROCEDURE — 99214 OFFICE O/P EST MOD 30 MIN: CPT | Mod: S$GLB,CS,, | Performed by: PHYSICIAN ASSISTANT

## 2021-10-26 PROCEDURE — 1160F RVW MEDS BY RX/DR IN RCRD: CPT | Mod: CPTII,S$GLB,, | Performed by: PHYSICIAN ASSISTANT

## 2021-10-26 PROCEDURE — 3078F PR MOST RECENT DIASTOLIC BLOOD PRESSURE < 80 MM HG: ICD-10-PCS | Mod: CPTII,S$GLB,, | Performed by: PHYSICIAN ASSISTANT

## 2021-10-26 PROCEDURE — 3074F PR MOST RECENT SYSTOLIC BLOOD PRESSURE < 130 MM HG: ICD-10-PCS | Mod: CPTII,S$GLB,, | Performed by: PHYSICIAN ASSISTANT

## 2021-10-26 RX ORDER — BENZONATATE 100 MG/1
100 CAPSULE ORAL 3 TIMES DAILY PRN
Qty: 21 CAPSULE | Refills: 0 | Status: SHIPPED | OUTPATIENT
Start: 2021-10-26 | End: 2021-11-02

## 2021-10-26 RX ORDER — DEXAMETHASONE SODIUM PHOSPHATE 4 MG/ML
4 INJECTION, SOLUTION INTRA-ARTICULAR; INTRALESIONAL; INTRAMUSCULAR; INTRAVENOUS; SOFT TISSUE
Status: CANCELLED | OUTPATIENT
Start: 2021-10-26 | End: 2021-10-26

## 2021-10-26 RX ORDER — PROMETHAZINE HYDROCHLORIDE AND DEXTROMETHORPHAN HYDROBROMIDE 6.25; 15 MG/5ML; MG/5ML
5 SYRUP ORAL EVERY 6 HOURS PRN
Qty: 118 ML | Refills: 0 | Status: SHIPPED | OUTPATIENT
Start: 2021-10-26 | End: 2021-11-03 | Stop reason: SDUPTHER

## 2021-10-27 ENCOUNTER — TELEPHONE (OUTPATIENT)
Dept: INTERNAL MEDICINE | Facility: CLINIC | Age: 71
End: 2021-10-27
Payer: MEDICARE

## 2021-10-27 ENCOUNTER — OFFICE VISIT (OUTPATIENT)
Dept: URGENT CARE | Facility: CLINIC | Age: 71
End: 2021-10-27
Payer: MEDICARE

## 2021-10-27 VITALS
WEIGHT: 130 LBS | HEART RATE: 98 BPM | RESPIRATION RATE: 18 BRPM | SYSTOLIC BLOOD PRESSURE: 137 MMHG | OXYGEN SATURATION: 97 % | BODY MASS INDEX: 23.04 KG/M2 | TEMPERATURE: 98 F | DIASTOLIC BLOOD PRESSURE: 69 MMHG | HEIGHT: 63 IN

## 2021-10-27 DIAGNOSIS — J32.9 SINUSITIS, UNSPECIFIED CHRONICITY, UNSPECIFIED LOCATION: ICD-10-CM

## 2021-10-27 DIAGNOSIS — J02.9 SORE THROAT: Primary | ICD-10-CM

## 2021-10-27 LAB
CTP QC/QA: YES
MOLECULAR STREP A: NEGATIVE

## 2021-10-27 PROCEDURE — 1160F PR REVIEW ALL MEDS BY PRESCRIBER/CLIN PHARMACIST DOCUMENTED: ICD-10-PCS | Mod: CPTII,S$GLB,, | Performed by: NURSE PRACTITIONER

## 2021-10-27 PROCEDURE — 3078F DIAST BP <80 MM HG: CPT | Mod: CPTII,S$GLB,, | Performed by: NURSE PRACTITIONER

## 2021-10-27 PROCEDURE — 99214 OFFICE O/P EST MOD 30 MIN: CPT | Mod: S$GLB,,, | Performed by: NURSE PRACTITIONER

## 2021-10-27 PROCEDURE — 3075F SYST BP GE 130 - 139MM HG: CPT | Mod: CPTII,S$GLB,, | Performed by: NURSE PRACTITIONER

## 2021-10-27 PROCEDURE — 3075F PR MOST RECENT SYSTOLIC BLOOD PRESS GE 130-139MM HG: ICD-10-PCS | Mod: CPTII,S$GLB,, | Performed by: NURSE PRACTITIONER

## 2021-10-27 PROCEDURE — 99214 PR OFFICE/OUTPT VISIT, EST, LEVL IV, 30-39 MIN: ICD-10-PCS | Mod: S$GLB,,, | Performed by: NURSE PRACTITIONER

## 2021-10-27 PROCEDURE — 3008F PR BODY MASS INDEX (BMI) DOCUMENTED: ICD-10-PCS | Mod: CPTII,S$GLB,, | Performed by: NURSE PRACTITIONER

## 2021-10-27 PROCEDURE — 1159F MED LIST DOCD IN RCRD: CPT | Mod: CPTII,S$GLB,, | Performed by: NURSE PRACTITIONER

## 2021-10-27 PROCEDURE — 3078F PR MOST RECENT DIASTOLIC BLOOD PRESSURE < 80 MM HG: ICD-10-PCS | Mod: CPTII,S$GLB,, | Performed by: NURSE PRACTITIONER

## 2021-10-27 PROCEDURE — 3008F BODY MASS INDEX DOCD: CPT | Mod: CPTII,S$GLB,, | Performed by: NURSE PRACTITIONER

## 2021-10-27 PROCEDURE — 1160F RVW MEDS BY RX/DR IN RCRD: CPT | Mod: CPTII,S$GLB,, | Performed by: NURSE PRACTITIONER

## 2021-10-27 PROCEDURE — 1159F PR MEDICATION LIST DOCUMENTED IN MEDICAL RECORD: ICD-10-PCS | Mod: CPTII,S$GLB,, | Performed by: NURSE PRACTITIONER

## 2021-10-27 PROCEDURE — 87651 POCT STREP A MOLECULAR: ICD-10-PCS | Mod: QW,S$GLB,, | Performed by: NURSE PRACTITIONER

## 2021-10-27 PROCEDURE — 87651 STREP A DNA AMP PROBE: CPT | Mod: QW,S$GLB,, | Performed by: NURSE PRACTITIONER

## 2021-10-27 RX ORDER — GUAIFENESIN 1200 MG/1
1 TABLET, EXTENDED RELEASE ORAL EVERY 12 HOURS PRN
Qty: 30 TABLET | Refills: 0 | Status: SHIPPED | OUTPATIENT
Start: 2021-10-27 | End: 2021-11-06

## 2021-10-27 RX ORDER — AMOXICILLIN AND CLAVULANATE POTASSIUM 875; 125 MG/1; MG/1
1 TABLET, FILM COATED ORAL 2 TIMES DAILY
Qty: 10 TABLET | Refills: 0 | Status: SHIPPED | OUTPATIENT
Start: 2021-10-27 | End: 2021-11-01

## 2021-10-27 RX ORDER — PREDNISONE 10 MG/1
10 TABLET ORAL DAILY
Qty: 3 TABLET | Refills: 0 | Status: SHIPPED | OUTPATIENT
Start: 2021-10-27 | End: 2021-10-30

## 2021-11-03 ENCOUNTER — OFFICE VISIT (OUTPATIENT)
Dept: INTERNAL MEDICINE | Facility: CLINIC | Age: 71
End: 2021-11-03
Payer: MEDICARE

## 2021-11-03 VITALS
BODY MASS INDEX: 22.07 KG/M2 | HEART RATE: 92 BPM | SYSTOLIC BLOOD PRESSURE: 102 MMHG | HEIGHT: 63 IN | WEIGHT: 124.56 LBS | OXYGEN SATURATION: 96 % | TEMPERATURE: 98 F | DIASTOLIC BLOOD PRESSURE: 72 MMHG

## 2021-11-03 DIAGNOSIS — R05.9 COUGH: ICD-10-CM

## 2021-11-03 DIAGNOSIS — E78.5 HYPERLIPIDEMIA, UNSPECIFIED HYPERLIPIDEMIA TYPE: ICD-10-CM

## 2021-11-03 DIAGNOSIS — D84.9 IMMUNOSUPPRESSED STATUS: ICD-10-CM

## 2021-11-03 DIAGNOSIS — E55.9 VITAMIN D DEFICIENCY: ICD-10-CM

## 2021-11-03 DIAGNOSIS — J32.9 SINUSITIS, UNSPECIFIED CHRONICITY, UNSPECIFIED LOCATION: Primary | ICD-10-CM

## 2021-11-03 DIAGNOSIS — R73.09 ABNORMAL GLUCOSE: ICD-10-CM

## 2021-11-03 PROCEDURE — 3074F PR MOST RECENT SYSTOLIC BLOOD PRESSURE < 130 MM HG: ICD-10-PCS | Mod: HCNC,CPTII,S$GLB, | Performed by: FAMILY MEDICINE

## 2021-11-03 PROCEDURE — 99999 PR PBB SHADOW E&M-EST. PATIENT-LVL IV: CPT | Mod: PBBFAC,HCNC,, | Performed by: FAMILY MEDICINE

## 2021-11-03 PROCEDURE — 1159F PR MEDICATION LIST DOCUMENTED IN MEDICAL RECORD: ICD-10-PCS | Mod: HCNC,CPTII,S$GLB, | Performed by: FAMILY MEDICINE

## 2021-11-03 PROCEDURE — 99214 OFFICE O/P EST MOD 30 MIN: CPT | Mod: HCNC,25,S$GLB, | Performed by: FAMILY MEDICINE

## 2021-11-03 PROCEDURE — 96372 PR INJECTION,THERAP/PROPH/DIAG2ST, IM OR SUBCUT: ICD-10-PCS | Mod: HCNC,S$GLB,, | Performed by: FAMILY MEDICINE

## 2021-11-03 PROCEDURE — 1101F PR PT FALLS ASSESS DOC 0-1 FALLS W/OUT INJ PAST YR: ICD-10-PCS | Mod: HCNC,CPTII,S$GLB, | Performed by: FAMILY MEDICINE

## 2021-11-03 PROCEDURE — 3008F BODY MASS INDEX DOCD: CPT | Mod: HCNC,CPTII,S$GLB, | Performed by: FAMILY MEDICINE

## 2021-11-03 PROCEDURE — 99499 RISK ADDL DX/OHS AUDIT: ICD-10-PCS | Mod: S$GLB,,, | Performed by: FAMILY MEDICINE

## 2021-11-03 PROCEDURE — 3288F PR FALLS RISK ASSESSMENT DOCUMENTED: ICD-10-PCS | Mod: HCNC,CPTII,S$GLB, | Performed by: FAMILY MEDICINE

## 2021-11-03 PROCEDURE — 99999 PR PBB SHADOW E&M-EST. PATIENT-LVL IV: ICD-10-PCS | Mod: PBBFAC,HCNC,, | Performed by: FAMILY MEDICINE

## 2021-11-03 PROCEDURE — 1159F MED LIST DOCD IN RCRD: CPT | Mod: HCNC,CPTII,S$GLB, | Performed by: FAMILY MEDICINE

## 2021-11-03 PROCEDURE — 96372 THER/PROPH/DIAG INJ SC/IM: CPT | Mod: HCNC,S$GLB,, | Performed by: FAMILY MEDICINE

## 2021-11-03 PROCEDURE — 99214 PR OFFICE/OUTPT VISIT, EST, LEVL IV, 30-39 MIN: ICD-10-PCS | Mod: HCNC,25,S$GLB, | Performed by: FAMILY MEDICINE

## 2021-11-03 PROCEDURE — 3074F SYST BP LT 130 MM HG: CPT | Mod: HCNC,CPTII,S$GLB, | Performed by: FAMILY MEDICINE

## 2021-11-03 PROCEDURE — 3078F DIAST BP <80 MM HG: CPT | Mod: HCNC,CPTII,S$GLB, | Performed by: FAMILY MEDICINE

## 2021-11-03 PROCEDURE — 3288F FALL RISK ASSESSMENT DOCD: CPT | Mod: HCNC,CPTII,S$GLB, | Performed by: FAMILY MEDICINE

## 2021-11-03 PROCEDURE — 99499 UNLISTED E&M SERVICE: CPT | Mod: S$GLB,,, | Performed by: FAMILY MEDICINE

## 2021-11-03 PROCEDURE — 1101F PT FALLS ASSESS-DOCD LE1/YR: CPT | Mod: HCNC,CPTII,S$GLB, | Performed by: FAMILY MEDICINE

## 2021-11-03 PROCEDURE — 3078F PR MOST RECENT DIASTOLIC BLOOD PRESSURE < 80 MM HG: ICD-10-PCS | Mod: HCNC,CPTII,S$GLB, | Performed by: FAMILY MEDICINE

## 2021-11-03 PROCEDURE — 3008F PR BODY MASS INDEX (BMI) DOCUMENTED: ICD-10-PCS | Mod: HCNC,CPTII,S$GLB, | Performed by: FAMILY MEDICINE

## 2021-11-03 RX ORDER — PROMETHAZINE HYDROCHLORIDE AND DEXTROMETHORPHAN HYDROBROMIDE 6.25; 15 MG/5ML; MG/5ML
5 SYRUP ORAL NIGHTLY PRN
Qty: 180 ML | Refills: 0 | Status: SHIPPED | OUTPATIENT
Start: 2021-11-03 | End: 2022-09-12

## 2021-11-03 RX ORDER — AMOXICILLIN AND CLAVULANATE POTASSIUM 875; 125 MG/1; MG/1
1 TABLET, FILM COATED ORAL EVERY 12 HOURS
Qty: 20 TABLET | Refills: 0 | Status: SHIPPED | OUTPATIENT
Start: 2021-11-03 | End: 2021-11-13

## 2021-11-03 RX ORDER — PROMETHAZINE HYDROCHLORIDE AND DEXTROMETHORPHAN HYDROBROMIDE 6.25; 15 MG/5ML; MG/5ML
5 SYRUP ORAL EVERY 6 HOURS PRN
Qty: 118 ML | Refills: 0 | Status: CANCELLED | OUTPATIENT
Start: 2021-11-03 | End: 2021-11-13

## 2021-11-03 RX ORDER — ALBUTEROL SULFATE 90 UG/1
2 AEROSOL, METERED RESPIRATORY (INHALATION) EVERY 4 HOURS PRN
Qty: 18 G | Refills: 0 | Status: SHIPPED | OUTPATIENT
Start: 2021-11-03 | End: 2022-01-25

## 2021-11-03 RX ORDER — METHYLPREDNISOLONE ACETATE 80 MG/ML
80 INJECTION, SUSPENSION INTRA-ARTICULAR; INTRALESIONAL; INTRAMUSCULAR; SOFT TISSUE
Status: COMPLETED | OUTPATIENT
Start: 2021-11-03 | End: 2021-11-03

## 2021-11-03 RX ADMIN — METHYLPREDNISOLONE ACETATE 80 MG: 80 INJECTION, SUSPENSION INTRA-ARTICULAR; INTRALESIONAL; INTRAMUSCULAR; SOFT TISSUE at 08:11

## 2021-12-27 ENCOUNTER — LAB VISIT (OUTPATIENT)
Dept: LAB | Facility: HOSPITAL | Age: 71
End: 2021-12-27
Attending: FAMILY MEDICINE
Payer: MEDICARE

## 2021-12-27 DIAGNOSIS — R73.09 ABNORMAL GLUCOSE: ICD-10-CM

## 2021-12-27 DIAGNOSIS — E78.5 HYPERLIPIDEMIA, UNSPECIFIED HYPERLIPIDEMIA TYPE: ICD-10-CM

## 2021-12-27 DIAGNOSIS — E55.9 VITAMIN D DEFICIENCY: ICD-10-CM

## 2021-12-27 LAB
25(OH)D3+25(OH)D2 SERPL-MCNC: 38 NG/ML (ref 30–96)
ALBUMIN SERPL BCP-MCNC: 3.3 G/DL (ref 3.5–5.2)
ALP SERPL-CCNC: 116 U/L (ref 55–135)
ALT SERPL W/O P-5'-P-CCNC: 21 U/L (ref 10–44)
ANION GAP SERPL CALC-SCNC: 8 MMOL/L (ref 8–16)
AST SERPL-CCNC: 22 U/L (ref 10–40)
BILIRUB SERPL-MCNC: 0.3 MG/DL (ref 0.1–1)
BUN SERPL-MCNC: 17 MG/DL (ref 8–23)
CALCIUM SERPL-MCNC: 9.2 MG/DL (ref 8.7–10.5)
CHLORIDE SERPL-SCNC: 104 MMOL/L (ref 95–110)
CHOLEST SERPL-MCNC: 199 MG/DL (ref 120–199)
CHOLEST/HDLC SERPL: 3.2 {RATIO} (ref 2–5)
CO2 SERPL-SCNC: 26 MMOL/L (ref 23–29)
CREAT SERPL-MCNC: 0.7 MG/DL (ref 0.5–1.4)
EST. GFR  (AFRICAN AMERICAN): >60 ML/MIN/1.73 M^2
EST. GFR  (NON AFRICAN AMERICAN): >60 ML/MIN/1.73 M^2
ESTIMATED AVG GLUCOSE: 108 MG/DL (ref 68–131)
GLUCOSE SERPL-MCNC: 86 MG/DL (ref 70–110)
HBA1C MFR BLD: 5.4 % (ref 4–5.6)
HDLC SERPL-MCNC: 62 MG/DL (ref 40–75)
HDLC SERPL: 31.2 % (ref 20–50)
LDLC SERPL CALC-MCNC: 122.6 MG/DL (ref 63–159)
NONHDLC SERPL-MCNC: 137 MG/DL
POTASSIUM SERPL-SCNC: 3.9 MMOL/L (ref 3.5–5.1)
PROT SERPL-MCNC: 6.8 G/DL (ref 6–8.4)
SODIUM SERPL-SCNC: 138 MMOL/L (ref 136–145)
TRIGL SERPL-MCNC: 72 MG/DL (ref 30–150)
TSH SERPL DL<=0.005 MIU/L-ACNC: 0.88 UIU/ML (ref 0.4–4)

## 2021-12-27 PROCEDURE — 36415 COLL VENOUS BLD VENIPUNCTURE: CPT | Mod: HCNC,PO | Performed by: FAMILY MEDICINE

## 2021-12-27 PROCEDURE — 82306 VITAMIN D 25 HYDROXY: CPT | Mod: HCNC | Performed by: FAMILY MEDICINE

## 2021-12-27 PROCEDURE — 80053 COMPREHEN METABOLIC PANEL: CPT | Mod: HCNC | Performed by: FAMILY MEDICINE

## 2021-12-27 PROCEDURE — 83036 HEMOGLOBIN GLYCOSYLATED A1C: CPT | Mod: HCNC | Performed by: FAMILY MEDICINE

## 2021-12-27 PROCEDURE — 85025 COMPLETE CBC W/AUTO DIFF WBC: CPT | Mod: HCNC | Performed by: FAMILY MEDICINE

## 2021-12-27 PROCEDURE — 84443 ASSAY THYROID STIM HORMONE: CPT | Mod: HCNC | Performed by: FAMILY MEDICINE

## 2021-12-27 PROCEDURE — 80061 LIPID PANEL: CPT | Mod: HCNC | Performed by: FAMILY MEDICINE

## 2021-12-28 LAB
BASOPHILS # BLD AUTO: 0.06 K/UL (ref 0–0.2)
BASOPHILS NFR BLD: 0.8 % (ref 0–1.9)
DIFFERENTIAL METHOD: ABNORMAL
EOSINOPHIL # BLD AUTO: 0.2 K/UL (ref 0–0.5)
EOSINOPHIL NFR BLD: 2 % (ref 0–8)
ERYTHROCYTE [DISTWIDTH] IN BLOOD BY AUTOMATED COUNT: 14.2 % (ref 11.5–14.5)
HCT VFR BLD AUTO: 35 % (ref 37–48.5)
HGB BLD-MCNC: 11.3 G/DL (ref 12–16)
IMM GRANULOCYTES # BLD AUTO: 0.02 K/UL (ref 0–0.04)
IMM GRANULOCYTES NFR BLD AUTO: 0.3 % (ref 0–0.5)
LYMPHOCYTES # BLD AUTO: 2.8 K/UL (ref 1–4.8)
LYMPHOCYTES NFR BLD: 37.5 % (ref 18–48)
MCH RBC QN AUTO: 30.1 PG (ref 27–31)
MCHC RBC AUTO-ENTMCNC: 32.3 G/DL (ref 32–36)
MCV RBC AUTO: 93 FL (ref 82–98)
MONOCYTES # BLD AUTO: 0.6 K/UL (ref 0.3–1)
MONOCYTES NFR BLD: 8 % (ref 4–15)
NEUTROPHILS # BLD AUTO: 3.8 K/UL (ref 1.8–7.7)
NEUTROPHILS NFR BLD: 51.4 % (ref 38–73)
NRBC BLD-RTO: 0 /100 WBC
PLATELET # BLD AUTO: 287 K/UL (ref 150–450)
PMV BLD AUTO: 10.9 FL (ref 9.2–12.9)
RBC # BLD AUTO: 3.76 M/UL (ref 4–5.4)
WBC # BLD AUTO: 7.47 K/UL (ref 3.9–12.7)

## 2022-01-03 ENCOUNTER — TELEPHONE (OUTPATIENT)
Dept: INTERNAL MEDICINE | Facility: CLINIC | Age: 72
End: 2022-01-03
Payer: MEDICARE

## 2022-01-10 ENCOUNTER — OFFICE VISIT (OUTPATIENT)
Dept: INTERNAL MEDICINE | Facility: CLINIC | Age: 72
End: 2022-01-10
Payer: MEDICARE

## 2022-01-10 VITALS
HEIGHT: 63 IN | TEMPERATURE: 98 F | BODY MASS INDEX: 21.95 KG/M2 | HEART RATE: 95 BPM | SYSTOLIC BLOOD PRESSURE: 100 MMHG | DIASTOLIC BLOOD PRESSURE: 68 MMHG | WEIGHT: 123.88 LBS

## 2022-01-10 DIAGNOSIS — F41.1 ANXIETY STATE: ICD-10-CM

## 2022-01-10 DIAGNOSIS — F41.1 ANXIETY STATE: Chronic | ICD-10-CM

## 2022-01-10 DIAGNOSIS — M81.0 OSTEOPOROSIS, UNSPECIFIED OSTEOPOROSIS TYPE, UNSPECIFIED PATHOLOGICAL FRACTURE PRESENCE: Chronic | ICD-10-CM

## 2022-01-10 DIAGNOSIS — Z00.00 ANNUAL PHYSICAL EXAM: Primary | ICD-10-CM

## 2022-01-10 DIAGNOSIS — M06.9 RHEUMATOID ARTHRITIS, INVOLVING UNSPECIFIED SITE, UNSPECIFIED WHETHER RHEUMATOID FACTOR PRESENT: Chronic | ICD-10-CM

## 2022-01-10 DIAGNOSIS — D84.9 IMMUNOSUPPRESSED STATUS: ICD-10-CM

## 2022-01-10 PROCEDURE — 1101F PR PT FALLS ASSESS DOC 0-1 FALLS W/OUT INJ PAST YR: ICD-10-PCS | Mod: HCNC,CPTII,S$GLB, | Performed by: NURSE PRACTITIONER

## 2022-01-10 PROCEDURE — 3078F PR MOST RECENT DIASTOLIC BLOOD PRESSURE < 80 MM HG: ICD-10-PCS | Mod: HCNC,CPTII,S$GLB, | Performed by: NURSE PRACTITIONER

## 2022-01-10 PROCEDURE — 3074F PR MOST RECENT SYSTOLIC BLOOD PRESSURE < 130 MM HG: ICD-10-PCS | Mod: HCNC,CPTII,S$GLB, | Performed by: NURSE PRACTITIONER

## 2022-01-10 PROCEDURE — 3008F PR BODY MASS INDEX (BMI) DOCUMENTED: ICD-10-PCS | Mod: HCNC,CPTII,S$GLB, | Performed by: NURSE PRACTITIONER

## 2022-01-10 PROCEDURE — 99499 UNLISTED E&M SERVICE: CPT | Mod: HCNC,S$GLB,, | Performed by: NURSE PRACTITIONER

## 2022-01-10 PROCEDURE — 1159F PR MEDICATION LIST DOCUMENTED IN MEDICAL RECORD: ICD-10-PCS | Mod: HCNC,CPTII,S$GLB, | Performed by: NURSE PRACTITIONER

## 2022-01-10 PROCEDURE — 1101F PT FALLS ASSESS-DOCD LE1/YR: CPT | Mod: HCNC,CPTII,S$GLB, | Performed by: NURSE PRACTITIONER

## 2022-01-10 PROCEDURE — 3074F SYST BP LT 130 MM HG: CPT | Mod: HCNC,CPTII,S$GLB, | Performed by: NURSE PRACTITIONER

## 2022-01-10 PROCEDURE — 99499 RISK ADDL DX/OHS AUDIT: ICD-10-PCS | Mod: HCNC,S$GLB,, | Performed by: NURSE PRACTITIONER

## 2022-01-10 PROCEDURE — 3288F FALL RISK ASSESSMENT DOCD: CPT | Mod: HCNC,CPTII,S$GLB, | Performed by: NURSE PRACTITIONER

## 2022-01-10 PROCEDURE — 1160F PR REVIEW ALL MEDS BY PRESCRIBER/CLIN PHARMACIST DOCUMENTED: ICD-10-PCS | Mod: HCNC,CPTII,S$GLB, | Performed by: NURSE PRACTITIONER

## 2022-01-10 PROCEDURE — 3288F PR FALLS RISK ASSESSMENT DOCUMENTED: ICD-10-PCS | Mod: HCNC,CPTII,S$GLB, | Performed by: NURSE PRACTITIONER

## 2022-01-10 PROCEDURE — 1160F RVW MEDS BY RX/DR IN RCRD: CPT | Mod: HCNC,CPTII,S$GLB, | Performed by: NURSE PRACTITIONER

## 2022-01-10 PROCEDURE — 3078F DIAST BP <80 MM HG: CPT | Mod: HCNC,CPTII,S$GLB, | Performed by: NURSE PRACTITIONER

## 2022-01-10 PROCEDURE — 99999 PR PBB SHADOW E&M-EST. PATIENT-LVL III: ICD-10-PCS | Mod: PBBFAC,HCNC,, | Performed by: NURSE PRACTITIONER

## 2022-01-10 PROCEDURE — 1159F MED LIST DOCD IN RCRD: CPT | Mod: HCNC,CPTII,S$GLB, | Performed by: NURSE PRACTITIONER

## 2022-01-10 PROCEDURE — 99397 PER PM REEVAL EST PAT 65+ YR: CPT | Mod: HCNC,S$GLB,, | Performed by: NURSE PRACTITIONER

## 2022-01-10 PROCEDURE — 3008F BODY MASS INDEX DOCD: CPT | Mod: HCNC,CPTII,S$GLB, | Performed by: NURSE PRACTITIONER

## 2022-01-10 PROCEDURE — 99397 PR PREVENTIVE VISIT,EST,65 & OVER: ICD-10-PCS | Mod: HCNC,S$GLB,, | Performed by: NURSE PRACTITIONER

## 2022-01-10 PROCEDURE — 99999 PR PBB SHADOW E&M-EST. PATIENT-LVL III: CPT | Mod: PBBFAC,HCNC,, | Performed by: NURSE PRACTITIONER

## 2022-01-10 NOTE — TELEPHONE ENCOUNTER
No new care gaps identified.  Powered by [employer] by [employer]. Reference number: [id number].   1/10/2022 5:10:26 PM CST

## 2022-01-10 NOTE — PROGRESS NOTES
"Subjective:       Patient ID: Emmy Padilla is a 71 y.o. female.    Chief Complaint: Follow-up (On labs )    HPI  69 yo with RA/immunocompromised, osteoporosis, depression here for annual  Followed by rheum at [facility] Dr. Jan Rogers Rinvoq, tramadol for flares,   Has ten, stable on cymbalta hs and clonazepam hs  Gyn up to date with Dr. Sujata Wyman  Bowels move fine  Sleep is good  Had covid 19 Jan 2021. Has not gotten covid 19 vaccine  No falls  Has hx of osteoporosis. Did prolia but insurance is not covering.     Follow-up  Associated symptoms include arthralgias. Pertinent negatives include no abdominal pain, chest pain, chills, coughing, diaphoresis, fatigue, fever, headaches, nausea or vomiting.       /68   Pulse 95   Temp 97.7 °F (36.5 °C)   Ht 5' 3" (1.6 m)   Wt 56.2 kg (123 lb 14.4 oz)   BMI 21.95 kg/m²     Review of Systems   Constitutional: Negative for appetite change, chills, diaphoresis, fatigue and fever.   HENT: Negative.    Eyes: Negative for visual disturbance.   Respiratory: Negative for cough, shortness of breath and wheezing.    Cardiovascular: Negative for chest pain, palpitations and leg swelling.   Gastrointestinal: Negative for abdominal distention, abdominal pain, blood in stool, constipation, diarrhea, nausea and vomiting.   Genitourinary: Negative for decreased urine volume, difficulty urinating, dysuria, frequency, hematuria and urgency.   Musculoskeletal: Positive for arthralgias.   Neurological: Negative.  Negative for dizziness, syncope, speech difficulty, light-headedness and headaches.   Psychiatric/Behavioral: Negative for agitation, confusion and hallucinations. The patient is not nervous/anxious.        Objective:      Physical Exam  Vitals and nursing note reviewed.   Constitutional:       General: She is not in acute distress.     Appearance: Normal appearance. She is well-developed and well-nourished. She is not diaphoretic.   HENT:      Head: Normocephalic " and atraumatic.      Right Ear: Hearing, tympanic membrane, ear canal and external ear normal.      Left Ear: Hearing, tympanic membrane, ear canal and external ear normal.      Nose: Nose normal. No mucosal edema or rhinorrhea.      Right Sinus: No maxillary sinus tenderness or frontal sinus tenderness.      Left Sinus: No maxillary sinus tenderness or frontal sinus tenderness.      Mouth/Throat:      Mouth: Oropharynx is clear and moist and mucous membranes are normal.      Pharynx: Uvula midline. No oropharyngeal exudate, posterior oropharyngeal edema or posterior oropharyngeal erythema.   Eyes:      General:         Right eye: No discharge.         Left eye: No discharge.      Conjunctiva/sclera: Conjunctivae normal.      Pupils: Pupils are equal, round, and reactive to light.   Neck:      Thyroid: No thyroid mass or thyromegaly.      Vascular: No JVD.      Trachea: Trachea normal. No tracheal deviation.   Cardiovascular:      Rate and Rhythm: Normal rate and regular rhythm.      Heart sounds: Normal heart sounds. No murmur heard.      Pulmonary:      Effort: Pulmonary effort is normal. No respiratory distress.      Breath sounds: Normal breath sounds. No stridor. No decreased breath sounds, wheezing, rhonchi or rales.   Chest:      Chest wall: No tenderness.   Abdominal:      General: Bowel sounds are normal. There is no distension or ascites.      Palpations: Abdomen is soft. Abdomen is not rigid. There is no fluid wave or pulsatile liver.      Tenderness: There is no abdominal tenderness. There is no CVA tenderness, guarding or rebound.   Musculoskeletal:         General: Normal range of motion.      Cervical back: Normal range of motion.      Comments: Arthritis changes noted to hands/feet   Lymphadenopathy:      Head:      Right side of head: No tonsillar adenopathy.      Left side of head: No tonsillar adenopathy.      Cervical: No cervical adenopathy.   Skin:     General: Skin is warm and dry.       Findings: No rash.   Neurological:      Mental Status: She is alert and oriented to person, place, and time.   Psychiatric:         Mood and Affect: Mood and affect normal.         Speech: Speech normal.         Behavior: Behavior normal. Behavior is cooperative.         Thought Content: Thought content normal.         Judgment: Judgment normal.         Assessment:       1. Annual physical exam    2. Rheumatoid arthritis, involving unspecified site, unspecified whether rheumatoid factor present    3. Immunosuppressed status    4. Osteoporosis, unspecified osteoporosis type, unspecified pathological fracture presence    5. Anxiety state        Plan:       Emmy was seen today for follow-up.    Diagnoses and all orders for this visit:    Annual physical exam    Rheumatoid arthritis, involving unspecified site, unspecified whether rheumatoid factor present  Stable. Followed by Dr. Mata - clinic, on rinvoq    Immunosuppressed status  Stable. Has RA. Followed by Dr. Mata at  Clinic, on Rinvoq    Osteoporosis, unspecified osteoporosis type, unspecified pathological fracture presence  Start calcium and vitamin d  dexa due July  Not on anything for bones currently, start calcium and vitamin d- rheum was prescribing prolia but insurance stopped covering. Patient to discuss with rheum    Anxiety state  Stable on cymbalta with klonopin prn    reviewed wellness labs  anoop signed for mammo Dr. Sujata Wyman  Advised patient get shingrix  Discussed age appropriate anticipatory guidance, healthy diet and lifestyle, regular exercise, weight management.  Follow up Dr. BATEMAN 6 months for benzo use

## 2022-01-11 RX ORDER — CLONAZEPAM 1 MG/1
TABLET ORAL
Qty: 30 TABLET | Refills: 3 | Status: SHIPPED | OUTPATIENT
Start: 2022-01-11 | End: 2022-05-16

## 2022-01-21 NOTE — TELEPHONE ENCOUNTER
No new care gaps identified.  Powered by [employer] by [employer]. Reference number: [id number].   1/21/2022 7:47:21 AM CST

## 2022-01-25 ENCOUNTER — TELEPHONE (OUTPATIENT)
Dept: INTERNAL MEDICINE | Facility: CLINIC | Age: 72
End: 2022-01-25
Payer: MEDICARE

## 2022-01-25 RX ORDER — ALBUTEROL SULFATE 90 UG/1
2 AEROSOL, METERED RESPIRATORY (INHALATION) EVERY 4 HOURS PRN
Qty: 18 G | Refills: 3 | Status: SHIPPED | OUTPATIENT
Start: 2022-01-25

## 2022-01-25 NOTE — TELEPHONE ENCOUNTER
----- Message from Thony Acevedo sent at 1/25/2022 11:10 AM CST -----  Contact: Emmy  Patient is calling to speak with the nurse regarding the recent request for the albuterol (PROVENTIL/VENTOLIN HFA) 90 mcg/actuation inhaler prescription. Reports both patient and [facility] pharmacy have attempted to contact and sent requests. [facility]erred [facility] is [facility]. Please give patient a call back at .[phone] as requested.   [id number] [place] [facility] [id number] [facility]  [address] [facility]  [place] [address]  Phone: [phone] Fax: [phone]  Thanks,  FERNANDO

## 2022-02-18 ENCOUNTER — PATIENT OUTREACH (OUTPATIENT)
Dept: ADMINISTRATIVE | Facility: HOSPITAL | Age: 72
End: 2022-02-18
Payer: MEDICARE

## 2022-03-02 ENCOUNTER — PATIENT MESSAGE (OUTPATIENT)
Dept: RESEARCH | Facility: HOSPITAL | Age: 72
End: 2022-03-02
Payer: MEDICARE

## 2022-04-26 ENCOUNTER — OFFICE VISIT (OUTPATIENT)
Dept: INTERNAL MEDICINE | Facility: CLINIC | Age: 72
End: 2022-04-26
Payer: MEDICARE

## 2022-04-26 VITALS
HEART RATE: 88 BPM | TEMPERATURE: 98 F | WEIGHT: 122.81 LBS | SYSTOLIC BLOOD PRESSURE: 98 MMHG | HEIGHT: 63 IN | DIASTOLIC BLOOD PRESSURE: 60 MMHG | BODY MASS INDEX: 21.76 KG/M2

## 2022-04-26 DIAGNOSIS — R07.81 RIB PAIN ON LEFT SIDE: Primary | ICD-10-CM

## 2022-04-26 DIAGNOSIS — J30.2 SEASONAL ALLERGIC RHINITIS, UNSPECIFIED TRIGGER: ICD-10-CM

## 2022-04-26 DIAGNOSIS — M06.9 RHEUMATOID ARTHRITIS, INVOLVING UNSPECIFIED SITE, UNSPECIFIED WHETHER RHEUMATOID FACTOR PRESENT: ICD-10-CM

## 2022-04-26 DIAGNOSIS — F41.1 ANXIETY STATE: Chronic | ICD-10-CM

## 2022-04-26 PROCEDURE — 1101F PR PT FALLS ASSESS DOC 0-1 FALLS W/OUT INJ PAST YR: ICD-10-PCS | Mod: CPTII,S$GLB,, | Performed by: NURSE PRACTITIONER

## 2022-04-26 PROCEDURE — 1159F MED LIST DOCD IN RCRD: CPT | Mod: CPTII,S$GLB,, | Performed by: NURSE PRACTITIONER

## 2022-04-26 PROCEDURE — 3008F PR BODY MASS INDEX (BMI) DOCUMENTED: ICD-10-PCS | Mod: CPTII,S$GLB,, | Performed by: NURSE PRACTITIONER

## 2022-04-26 PROCEDURE — 99999 PR PBB SHADOW E&M-EST. PATIENT-LVL III: ICD-10-PCS | Mod: PBBFAC,,, | Performed by: NURSE PRACTITIONER

## 2022-04-26 PROCEDURE — 3288F PR FALLS RISK ASSESSMENT DOCUMENTED: ICD-10-PCS | Mod: CPTII,S$GLB,, | Performed by: NURSE PRACTITIONER

## 2022-04-26 PROCEDURE — 99214 PR OFFICE/OUTPT VISIT, EST, LEVL IV, 30-39 MIN: ICD-10-PCS | Mod: S$GLB,,, | Performed by: NURSE PRACTITIONER

## 2022-04-26 PROCEDURE — 99499 UNLISTED E&M SERVICE: CPT | Mod: S$GLB,,, | Performed by: NURSE PRACTITIONER

## 2022-04-26 PROCEDURE — 3288F FALL RISK ASSESSMENT DOCD: CPT | Mod: CPTII,S$GLB,, | Performed by: NURSE PRACTITIONER

## 2022-04-26 PROCEDURE — 1159F PR MEDICATION LIST DOCUMENTED IN MEDICAL RECORD: ICD-10-PCS | Mod: CPTII,S$GLB,, | Performed by: NURSE PRACTITIONER

## 2022-04-26 PROCEDURE — 1125F AMNT PAIN NOTED PAIN PRSNT: CPT | Mod: CPTII,S$GLB,, | Performed by: NURSE PRACTITIONER

## 2022-04-26 PROCEDURE — 1160F RVW MEDS BY RX/DR IN RCRD: CPT | Mod: CPTII,S$GLB,, | Performed by: NURSE PRACTITIONER

## 2022-04-26 PROCEDURE — 3074F SYST BP LT 130 MM HG: CPT | Mod: CPTII,S$GLB,, | Performed by: NURSE PRACTITIONER

## 2022-04-26 PROCEDURE — 1160F PR REVIEW ALL MEDS BY PRESCRIBER/CLIN PHARMACIST DOCUMENTED: ICD-10-PCS | Mod: CPTII,S$GLB,, | Performed by: NURSE PRACTITIONER

## 2022-04-26 PROCEDURE — 3008F BODY MASS INDEX DOCD: CPT | Mod: CPTII,S$GLB,, | Performed by: NURSE PRACTITIONER

## 2022-04-26 PROCEDURE — 99214 OFFICE O/P EST MOD 30 MIN: CPT | Mod: S$GLB,,, | Performed by: NURSE PRACTITIONER

## 2022-04-26 PROCEDURE — 1125F PR PAIN SEVERITY QUANTIFIED, PAIN PRESENT: ICD-10-PCS | Mod: CPTII,S$GLB,, | Performed by: NURSE PRACTITIONER

## 2022-04-26 PROCEDURE — 99999 PR PBB SHADOW E&M-EST. PATIENT-LVL III: CPT | Mod: PBBFAC,,, | Performed by: NURSE PRACTITIONER

## 2022-04-26 PROCEDURE — 3074F PR MOST RECENT SYSTOLIC BLOOD PRESSURE < 130 MM HG: ICD-10-PCS | Mod: CPTII,S$GLB,, | Performed by: NURSE PRACTITIONER

## 2022-04-26 PROCEDURE — 1101F PT FALLS ASSESS-DOCD LE1/YR: CPT | Mod: CPTII,S$GLB,, | Performed by: NURSE PRACTITIONER

## 2022-04-26 PROCEDURE — 3078F PR MOST RECENT DIASTOLIC BLOOD PRESSURE < 80 MM HG: ICD-10-PCS | Mod: CPTII,S$GLB,, | Performed by: NURSE PRACTITIONER

## 2022-04-26 PROCEDURE — 3078F DIAST BP <80 MM HG: CPT | Mod: CPTII,S$GLB,, | Performed by: NURSE PRACTITIONER

## 2022-04-26 PROCEDURE — 99499 RISK ADDL DX/OHS AUDIT: ICD-10-PCS | Mod: S$GLB,,, | Performed by: NURSE PRACTITIONER

## 2022-04-26 RX ORDER — FLUTICASONE PROPIONATE 50 MCG
2 SPRAY, SUSPENSION (ML) NASAL DAILY
Qty: 16 G | Refills: 0 | Status: SHIPPED | OUTPATIENT
Start: 2022-04-26 | End: 2022-06-24

## 2022-04-26 NOTE — PROGRESS NOTES
"Subjective:       Patient ID: Emmy Padilla is a 71 y.o. female.    Chief Complaint: Rib Injury, Fatigue, and Nasal Congestion    Patient here for left rib pain  Several years ago in [place] jesus mensah her a big hug and felt a thump in the rib, xray was nrormal  She states she has had issues since with pain to this area and feeling this area  A few weeks ago, was mopping and started with the pain to left lateral rib area  Took ibuprofen 600mg, wore a compression tank to help hold area in  Pain is much better, pain 1/10 today, can still feel it but improved    Patient has had some nasal congestion, post nasal drip the last few weeks, taking claritin    Daughter dx with stage 4 cervical cancer during pandemic  Did chemo  Had reassuring pet scan a few weeks ago and was told to do 6 more weeks of chemo then another pet scan    Patient tired/fatigue most days  Takes Cymbalta hs and clonopin hs    Fatigue  Associated symptoms include arthralgias, chest pain (left rib, chronic), congestion, fatigue and joint swelling. Pertinent negatives include no chills, coughing, diaphoresis, fever or rash.       BP 98/60   Pulse 88   Temp 97.8 °F (36.6 °C)   Ht 5' 3" (1.6 m)   Wt 55.7 kg (122 lb 12.7 oz)   BMI 21.75 kg/m²     Review of Systems   Constitutional: Positive for fatigue. Negative for activity change, appetite change, chills, diaphoresis, fever and unexpected weight change.   HENT: Positive for congestion and postnasal drip.    Respiratory: Negative for cough and shortness of breath.    Cardiovascular: Positive for chest pain (left rib, chronic). Negative for palpitations and leg swelling.   Gastrointestinal: Negative.    Genitourinary: Negative.    Musculoskeletal: Positive for arthralgias and joint swelling.   Skin: Negative for color change, pallor, rash and wound.   Allergic/Immunologic: Negative for immunocompromised state.   Neurological: Negative.  Negative for dizziness and facial asymmetry. "   Hematological: Negative for adenopathy. Does not bruise/bleed easily.   Psychiatric/Behavioral: Negative for agitation, behavioral problems and confusion.       Objective:      Physical Exam  Vitals and nursing note reviewed.   Constitutional:       General: She is not in acute distress.     Appearance: She is well-developed. She is not diaphoretic.   HENT:      Head: Normocephalic and atraumatic.      Right Ear: Tympanic membrane, ear canal and external ear normal.      Left Ear: Tympanic membrane, ear canal and external ear normal.      Nose: Nose normal.      Mouth/Throat:      Pharynx: Oropharynx is clear. Uvula midline.   Eyes:      General:         Right eye: No discharge.         Left eye: No discharge.      Conjunctiva/sclera: Conjunctivae normal.   Cardiovascular:      Rate and Rhythm: Normal rate and regular rhythm.      Heart sounds: Normal heart sounds. No murmur heard.  Pulmonary:      Effort: Pulmonary effort is normal. No respiratory distress.      Breath sounds: Normal breath sounds. No wheezing or rales.   Chest:      Chest wall: No tenderness.       Abdominal:      General: There is no distension.      Palpations: Abdomen is soft.   Musculoskeletal:         General: Normal range of motion.   Skin:     General: Skin is warm and dry.      Findings: No rash.   Neurological:      Mental Status: She is alert and oriented to person, place, and time.   Psychiatric:         Behavior: Behavior normal. Behavior is cooperative.         Thought Content: Thought content normal.         Judgment: Judgment normal.         Assessment:       1. Rib pain on left side    2. Seasonal allergic rhinitis, unspecified trigger    3. Rheumatoid arthritis, involving unspecified site, unspecified whether rheumatoid factor present    4. Anxiety state        Plan:       Emmy was seen today for rib injury, fatigue and nasal congestion.    Diagnoses and all orders for this visit:    Rib pain on left side  Ok for supportive  care nsaid as needed, heating pad    Seasonal allergic rhinitis, unspecified trigger  -     fluticasone propionate (FLONASE) 50 mcg/actuation nasal spray; 2 sprays (100 mcg total) by Each Nostril route once daily.    Rheumatoid arthritis, involving unspecified site, unspecified whether rheumatoid factor present  Comments:  stable. followed by rheum Dr. Traore, on Rinvoq    Anxiety state  Comments:  stable on Klonopin and cymbalta  Recommended patient start counseling

## 2022-04-27 ENCOUNTER — TELEPHONE (OUTPATIENT)
Dept: INTERNAL MEDICINE | Facility: CLINIC | Age: 72
End: 2022-04-27
Payer: MEDICARE

## 2022-04-27 DIAGNOSIS — R07.81 RIB PAIN ON LEFT SIDE: Primary | ICD-10-CM

## 2022-04-27 RX ORDER — METHOCARBAMOL 750 MG/1
750 TABLET, FILM COATED ORAL 3 TIMES DAILY PRN
Qty: 30 TABLET | Refills: 0 | Status: SHIPPED | OUTPATIENT
Start: 2022-04-27 | End: 2022-05-07

## 2022-04-27 NOTE — TELEPHONE ENCOUNTER
----- Message from Deanna Lockett sent at 4/27/2022  7:46 AM CDT -----  Regarding: pt advice  Contact: pt  Pt is calling to speak to nurse regarding getting rx for muscle relaxer that was offered to her yesterday. Pt uses     [id number] [place] - [address] [facility]  [address] [place] [address]  Phone: [phone] Fax: [phone]    Please call back at [phone]//thank you acc

## 2022-04-27 NOTE — TELEPHONE ENCOUNTER
Muscle relaxer sent in. Tell her I said to find a counselor for therapy as well for her mood/stress

## 2022-04-28 ENCOUNTER — TELEPHONE (OUTPATIENT)
Dept: INTERNAL MEDICINE | Facility: CLINIC | Age: 72
End: 2022-04-28
Payer: MEDICARE

## 2022-04-28 RX ORDER — MONTELUKAST SODIUM 10 MG/1
TABLET ORAL
Qty: 90 TABLET | Refills: 1 | Status: SHIPPED | OUTPATIENT
Start: 2022-04-28 | End: 2023-10-27 | Stop reason: SDUPTHER

## 2022-04-28 NOTE — TELEPHONE ENCOUNTER
No new care gaps identified.  Powered by [employer]. Reference number: [id number].   4/28/2022 9:41:54 AM CDT

## 2022-04-28 NOTE — TELEPHONE ENCOUNTER
Refill Authorization Note   Emmy Padilla  is requesting a refill authorization.  Brief Assessment and Rationale for Refill:  Approve     Medication Therapy Plan:       Medication Reconciliation Completed: No   Comments:     No Care Gaps recommended.     Note composed:2:08 PM 04/28/2022

## 2022-04-28 NOTE — TELEPHONE ENCOUNTER
----- Message from Isaac Ford sent at 4/28/2022  2:46 PM CDT -----  Contact: [facility]  Calling to confirm a prescription for the PT. Call back at [phone]

## 2022-04-29 ENCOUNTER — TELEPHONE (OUTPATIENT)
Dept: INTERNAL MEDICINE | Facility: CLINIC | Age: 72
End: 2022-04-29
Payer: MEDICARE

## 2022-04-29 NOTE — TELEPHONE ENCOUNTER
----- Message from Kelly Morales sent at 4/29/2022  7:46 AM CDT -----  States she had an abnormal PAD. She would like the nurse to give her a call. Please call pt [phone]. Thank you

## 2022-04-29 NOTE — TELEPHONE ENCOUNTER
Spoke with pt and she says she had a nurse from [facility] did an evaluation on yesterday and she did a PAD test on her legs. 0.52 on both legs. She said nurse said she will submit results to her pcp. She wants some advice what it means when test comes back abnormal

## 2022-05-04 ENCOUNTER — OFFICE VISIT (OUTPATIENT)
Dept: INTERNAL MEDICINE | Facility: CLINIC | Age: 72
End: 2022-05-04
Payer: MEDICARE

## 2022-05-04 VITALS
DIASTOLIC BLOOD PRESSURE: 60 MMHG | TEMPERATURE: 97 F | HEIGHT: 63 IN | WEIGHT: 123.88 LBS | SYSTOLIC BLOOD PRESSURE: 100 MMHG | HEART RATE: 82 BPM | BODY MASS INDEX: 21.95 KG/M2

## 2022-05-04 DIAGNOSIS — R68.89 ABNORMALITY ON SCREENING TEST: Primary | ICD-10-CM

## 2022-05-04 PROCEDURE — 3288F PR FALLS RISK ASSESSMENT DOCUMENTED: ICD-10-PCS | Mod: CPTII,S$GLB,, | Performed by: NURSE PRACTITIONER

## 2022-05-04 PROCEDURE — 1159F PR MEDICATION LIST DOCUMENTED IN MEDICAL RECORD: ICD-10-PCS | Mod: CPTII,S$GLB,, | Performed by: NURSE PRACTITIONER

## 2022-05-04 PROCEDURE — 99213 OFFICE O/P EST LOW 20 MIN: CPT | Mod: S$GLB,,, | Performed by: NURSE PRACTITIONER

## 2022-05-04 PROCEDURE — 99999 PR PBB SHADOW E&M-EST. PATIENT-LVL IV: CPT | Mod: PBBFAC,,, | Performed by: NURSE PRACTITIONER

## 2022-05-04 PROCEDURE — 3288F FALL RISK ASSESSMENT DOCD: CPT | Mod: CPTII,S$GLB,, | Performed by: NURSE PRACTITIONER

## 2022-05-04 PROCEDURE — 1101F PT FALLS ASSESS-DOCD LE1/YR: CPT | Mod: CPTII,S$GLB,, | Performed by: NURSE PRACTITIONER

## 2022-05-04 PROCEDURE — 3074F SYST BP LT 130 MM HG: CPT | Mod: CPTII,S$GLB,, | Performed by: NURSE PRACTITIONER

## 2022-05-04 PROCEDURE — 1126F AMNT PAIN NOTED NONE PRSNT: CPT | Mod: CPTII,S$GLB,, | Performed by: NURSE PRACTITIONER

## 2022-05-04 PROCEDURE — 3074F PR MOST RECENT SYSTOLIC BLOOD PRESSURE < 130 MM HG: ICD-10-PCS | Mod: CPTII,S$GLB,, | Performed by: NURSE PRACTITIONER

## 2022-05-04 PROCEDURE — 1160F PR REVIEW ALL MEDS BY PRESCRIBER/CLIN PHARMACIST DOCUMENTED: ICD-10-PCS | Mod: CPTII,S$GLB,, | Performed by: NURSE PRACTITIONER

## 2022-05-04 PROCEDURE — 1126F PR PAIN SEVERITY QUANTIFIED, NO PAIN PRESENT: ICD-10-PCS | Mod: CPTII,S$GLB,, | Performed by: NURSE PRACTITIONER

## 2022-05-04 PROCEDURE — 3078F DIAST BP <80 MM HG: CPT | Mod: CPTII,S$GLB,, | Performed by: NURSE PRACTITIONER

## 2022-05-04 PROCEDURE — 1101F PR PT FALLS ASSESS DOC 0-1 FALLS W/OUT INJ PAST YR: ICD-10-PCS | Mod: CPTII,S$GLB,, | Performed by: NURSE PRACTITIONER

## 2022-05-04 PROCEDURE — 1160F RVW MEDS BY RX/DR IN RCRD: CPT | Mod: CPTII,S$GLB,, | Performed by: NURSE PRACTITIONER

## 2022-05-04 PROCEDURE — 1159F MED LIST DOCD IN RCRD: CPT | Mod: CPTII,S$GLB,, | Performed by: NURSE PRACTITIONER

## 2022-05-04 PROCEDURE — 3008F PR BODY MASS INDEX (BMI) DOCUMENTED: ICD-10-PCS | Mod: CPTII,S$GLB,, | Performed by: NURSE PRACTITIONER

## 2022-05-04 PROCEDURE — 3008F BODY MASS INDEX DOCD: CPT | Mod: CPTII,S$GLB,, | Performed by: NURSE PRACTITIONER

## 2022-05-04 PROCEDURE — 99999 PR PBB SHADOW E&M-EST. PATIENT-LVL IV: ICD-10-PCS | Mod: PBBFAC,,, | Performed by: NURSE PRACTITIONER

## 2022-05-04 PROCEDURE — 3078F PR MOST RECENT DIASTOLIC BLOOD PRESSURE < 80 MM HG: ICD-10-PCS | Mod: CPTII,S$GLB,, | Performed by: NURSE PRACTITIONER

## 2022-05-04 PROCEDURE — 99213 PR OFFICE/OUTPT VISIT, EST, LEVL III, 20-29 MIN: ICD-10-PCS | Mod: S$GLB,,, | Performed by: NURSE PRACTITIONER

## 2022-05-04 RX ORDER — MUPIROCIN 20 MG/G
OINTMENT TOPICAL 3 TIMES DAILY
Qty: 30 G | Refills: 0 | Status: SHIPPED | OUTPATIENT
Start: 2022-05-04 | End: 2022-05-14

## 2022-05-04 NOTE — Clinical Note
Thi is the patient with the abnormal PAD home screening. The  company is [employer] and the test is a QuantaFlo. Patient did have nail polish on for the test if that makes a difference. See picture insert in my note from company.

## 2022-05-06 NOTE — PROGRESS NOTES
"Subjective:       Patient ID: Emmy Padilla is a 71 y.o. female.    Chief Complaint: Follow-up    Patient here for abnormal pad screening at home hra done by insurance company  She denies leg swelling, leg pain, numbness/tingline, pain with walking, color changes to skin of leg  She states there was a device called QuantaFlo that was put on her toes similar to a pulse ox machine  She did have nail polish on at the time.  The test came back at 0.52 bilaterally which is an abnormal finding    Follow-up  Pertinent negatives include no chest pain, chills, coughing, diaphoresis, fatigue, fever or rash.       /60   Pulse 82   Temp 97.2 °F (36.2 °C)   Ht 5' 3" (1.6 m)   Wt 56.2 kg (123 lb 14.4 oz)   BMI 21.95 kg/m²     Review of Systems   Constitutional: Negative for activity change, appetite change, chills, diaphoresis, fatigue, fever and unexpected weight change.   HENT: Negative.    Respiratory: Negative for cough and shortness of breath.    Cardiovascular: Negative for chest pain, palpitations and leg swelling.   Gastrointestinal: Negative.    Genitourinary: Negative.    Musculoskeletal: Negative.    Skin: Negative for color change, pallor, rash and wound.   Allergic/Immunologic: Negative for immunocompromised state.   Neurological: Negative.  Negative for dizziness and facial asymmetry.   Hematological: Negative for adenopathy. Does not bruise/bleed easily.   Psychiatric/Behavioral: Negative for agitation, behavioral problems and confusion.       Objective:      Physical Exam  Vitals and nursing note reviewed.   Constitutional:       General: She is not in acute distress.     Appearance: She is well-developed. She is not diaphoretic.   HENT:      Head: Normocephalic and atraumatic.      Mouth/Throat:      Pharynx: No oropharyngeal exudate.   Eyes:      General:         Right eye: No discharge.         Left eye: No discharge.      Conjunctiva/sclera: Conjunctivae normal.   Cardiovascular:      Rate " and Rhythm: Normal rate and regular rhythm.      Heart sounds: Normal heart sounds. No murmur heard.  Pulmonary:      Effort: Pulmonary effort is normal. No respiratory distress.      Breath sounds: Normal breath sounds. No wheezing.   Abdominal:      General: There is no distension.      Palpations: Abdomen is soft.   Musculoskeletal:         General: No tenderness. Normal range of motion.   Skin:     General: Skin is warm and dry.      Findings: No erythema or rash.   Neurological:      Mental Status: She is alert and oriented to person, place, and time.   Psychiatric:         Behavior: Behavior normal.         Thought Content: Thought content normal.         Judgment: Judgment normal.             Assessment:       1. Abnormality on screening test        Plan:       Emmy was seen today for follow-up.    Diagnoses and all orders for this visit:    Abnormality on screening test  -     Ankle Brachial Indices (RADHA); Future    Other orders  -     mupirocin (BACTROBAN) 2 % ointment; Apply topically 3 (three) times daily. for 10 days    radha ordered for further evaluation   Will contact with results and recommendations

## 2022-05-09 ENCOUNTER — HOSPITAL ENCOUNTER (OUTPATIENT)
Dept: CARDIOLOGY | Facility: HOSPITAL | Age: 72
Discharge: HOME | End: 2022-05-09
Attending: NURSE PRACTITIONER
Payer: MEDICARE

## 2022-05-09 VITALS
DIASTOLIC BLOOD PRESSURE: 86 MMHG | WEIGHT: 123 LBS | BODY MASS INDEX: 21.79 KG/M2 | SYSTOLIC BLOOD PRESSURE: 144 MMHG | HEIGHT: 63 IN

## 2022-05-09 DIAGNOSIS — R68.89 ABNORMALITY ON SCREENING TEST: ICD-10-CM

## 2022-05-09 LAB
ABI CLAUDICATION TIME: 0 MIN
ABI POST MINUTES1: 2 MIN
IMMEDIATE ARM BP: 186 MMHG
IMMEDIATE LEFT ABI: 1.02
IMMEDIATE LEFT TIBIAL: 189 MMHG
IMMEDIATE RIGHT ABI: 1.02
IMMEDIATE RIGHT TIBIAL: 189 MMHG
LEFT ABI: 1.19
LEFT ARM BP: 139 MMHG
LEFT DORSALIS PEDIS: 171 MMHG
LEFT POSTERIOR TIBIAL: 150 MMHG
LEFT TBI: 0.66
LEFT TOE PRESSURE: 95 MMHG
POST1 ARM BP: 163 MMHG
POST1 LEFT ABI: 1.01
POST1 LEFT TIBIAL: 165 MMHG
POST1 RIGHT ABI: 1.04
POST1 RIGHT TIBIAL: 170 MMHG
RIGHT ABI: 1.19
RIGHT ARM BP: 144 MMHG
RIGHT DORSALIS PEDIS: 159 MMHG
RIGHT POSTERIOR TIBIAL: 171 MMHG
RIGHT TBI: 0.33
RIGHT TOE PRESSURE: 48 MMHG

## 2022-05-09 PROCEDURE — 93924 LWR XTR VASC STDY BILAT: CPT | Mod: 26,,, | Performed by: INTERNAL MEDICINE

## 2022-05-09 PROCEDURE — 93924 ANKLE BRACHIAL INDICES (ABI): ICD-10-PCS | Mod: 26,,, | Performed by: INTERNAL MEDICINE

## 2022-05-09 PROCEDURE — 93924 LWR XTR VASC STDY BILAT: CPT | Mod: PO

## 2022-05-12 ENCOUNTER — TELEPHONE (OUTPATIENT)
Dept: INTERNAL MEDICINE | Facility: CLINIC | Age: 72
End: 2022-05-12
Payer: MEDICARE

## 2022-05-12 NOTE — TELEPHONE ENCOUNTER
----- Message from Ariana Larson sent at 5/12/2022 11:11 AM CDT -----  Contact: pt  Type:  Test Results    Who Called:  pt   Name of Test (Lab/Mammo/Etc): ankle brachial   Date of Test:    Ordering Provider: cha sheehan   Where the test was performed: [facility]   Would the patient rather a call back or a response via [facility]? Phone   Best Call Back Number: [phone]  Additional Information:

## 2022-05-12 NOTE — TELEPHONE ENCOUNTER
Called pt and left a vm per nurse gonzalez result note and if pt had any questions, to give us a call back

## 2022-05-13 ENCOUNTER — TELEPHONE (OUTPATIENT)
Dept: INTERNAL MEDICINE | Facility: CLINIC | Age: 72
End: 2022-05-13
Payer: MEDICARE

## 2022-05-13 NOTE — TELEPHONE ENCOUNTER
----- Message from Hazel Mendze sent at 5/13/2022  7:19 AM CDT -----  Regarding: same day  Contact: pt  Calling for same day left swollen ankle. [phone]

## 2022-05-14 DIAGNOSIS — F41.1 ANXIETY STATE: ICD-10-CM

## 2022-05-14 NOTE — TELEPHONE ENCOUNTER
No new care gaps identified.  [facility] Embedded Care Gaps. Reference number: [id number]. 5/14/2022   7:46:22 AM CDT

## 2022-05-16 RX ORDER — CLONAZEPAM 1 MG/1
TABLET ORAL
Qty: 30 TABLET | Refills: 3 | Status: SHIPPED | OUTPATIENT
Start: 2022-05-16 | End: 2022-09-20

## 2022-07-18 ENCOUNTER — OFFICE VISIT (OUTPATIENT)
Dept: INTERNAL MEDICINE | Facility: CLINIC | Age: 72
End: 2022-07-18
Payer: MEDICARE

## 2022-07-18 VITALS
SYSTOLIC BLOOD PRESSURE: 116 MMHG | BODY MASS INDEX: 21.33 KG/M2 | HEART RATE: 66 BPM | DIASTOLIC BLOOD PRESSURE: 80 MMHG | TEMPERATURE: 98 F | WEIGHT: 120.38 LBS | HEIGHT: 63 IN

## 2022-07-18 DIAGNOSIS — M81.0 OSTEOPOROSIS, UNSPECIFIED OSTEOPOROSIS TYPE, UNSPECIFIED PATHOLOGICAL FRACTURE PRESENCE: ICD-10-CM

## 2022-07-18 DIAGNOSIS — M06.9 RHEUMATOID ARTHRITIS, INVOLVING UNSPECIFIED SITE, UNSPECIFIED WHETHER RHEUMATOID FACTOR PRESENT: Chronic | ICD-10-CM

## 2022-07-18 DIAGNOSIS — E78.5 HYPERLIPIDEMIA, UNSPECIFIED HYPERLIPIDEMIA TYPE: ICD-10-CM

## 2022-07-18 DIAGNOSIS — F41.1 GAD (GENERALIZED ANXIETY DISORDER): Primary | ICD-10-CM

## 2022-07-18 PROCEDURE — 1160F PR REVIEW ALL MEDS BY PRESCRIBER/CLIN PHARMACIST DOCUMENTED: ICD-10-PCS | Mod: CPTII,S$GLB,, | Performed by: NURSE PRACTITIONER

## 2022-07-18 PROCEDURE — 1159F MED LIST DOCD IN RCRD: CPT | Mod: CPTII,S$GLB,, | Performed by: NURSE PRACTITIONER

## 2022-07-18 PROCEDURE — 99999 PR PBB SHADOW E&M-EST. PATIENT-LVL III: ICD-10-PCS | Mod: PBBFAC,,, | Performed by: NURSE PRACTITIONER

## 2022-07-18 PROCEDURE — 3008F BODY MASS INDEX DOCD: CPT | Mod: CPTII,S$GLB,, | Performed by: NURSE PRACTITIONER

## 2022-07-18 PROCEDURE — 1101F PR PT FALLS ASSESS DOC 0-1 FALLS W/OUT INJ PAST YR: ICD-10-PCS | Mod: CPTII,S$GLB,, | Performed by: NURSE PRACTITIONER

## 2022-07-18 PROCEDURE — 1160F RVW MEDS BY RX/DR IN RCRD: CPT | Mod: CPTII,S$GLB,, | Performed by: NURSE PRACTITIONER

## 2022-07-18 PROCEDURE — 3074F PR MOST RECENT SYSTOLIC BLOOD PRESSURE < 130 MM HG: ICD-10-PCS | Mod: CPTII,S$GLB,, | Performed by: NURSE PRACTITIONER

## 2022-07-18 PROCEDURE — 3079F DIAST BP 80-89 MM HG: CPT | Mod: CPTII,S$GLB,, | Performed by: NURSE PRACTITIONER

## 2022-07-18 PROCEDURE — 1159F PR MEDICATION LIST DOCUMENTED IN MEDICAL RECORD: ICD-10-PCS | Mod: CPTII,S$GLB,, | Performed by: NURSE PRACTITIONER

## 2022-07-18 PROCEDURE — 3074F SYST BP LT 130 MM HG: CPT | Mod: CPTII,S$GLB,, | Performed by: NURSE PRACTITIONER

## 2022-07-18 PROCEDURE — 3079F PR MOST RECENT DIASTOLIC BLOOD PRESSURE 80-89 MM HG: ICD-10-PCS | Mod: CPTII,S$GLB,, | Performed by: NURSE PRACTITIONER

## 2022-07-18 PROCEDURE — 1126F AMNT PAIN NOTED NONE PRSNT: CPT | Mod: CPTII,S$GLB,, | Performed by: NURSE PRACTITIONER

## 2022-07-18 PROCEDURE — 99999 PR PBB SHADOW E&M-EST. PATIENT-LVL III: CPT | Mod: PBBFAC,,, | Performed by: NURSE PRACTITIONER

## 2022-07-18 PROCEDURE — 3288F PR FALLS RISK ASSESSMENT DOCUMENTED: ICD-10-PCS | Mod: CPTII,S$GLB,, | Performed by: NURSE PRACTITIONER

## 2022-07-18 PROCEDURE — 3008F PR BODY MASS INDEX (BMI) DOCUMENTED: ICD-10-PCS | Mod: CPTII,S$GLB,, | Performed by: NURSE PRACTITIONER

## 2022-07-18 PROCEDURE — 99214 PR OFFICE/OUTPT VISIT, EST, LEVL IV, 30-39 MIN: ICD-10-PCS | Mod: S$GLB,,, | Performed by: NURSE PRACTITIONER

## 2022-07-18 PROCEDURE — 1126F PR PAIN SEVERITY QUANTIFIED, NO PAIN PRESENT: ICD-10-PCS | Mod: CPTII,S$GLB,, | Performed by: NURSE PRACTITIONER

## 2022-07-18 PROCEDURE — 1101F PT FALLS ASSESS-DOCD LE1/YR: CPT | Mod: CPTII,S$GLB,, | Performed by: NURSE PRACTITIONER

## 2022-07-18 PROCEDURE — 3288F FALL RISK ASSESSMENT DOCD: CPT | Mod: CPTII,S$GLB,, | Performed by: NURSE PRACTITIONER

## 2022-07-18 PROCEDURE — 99214 OFFICE O/P EST MOD 30 MIN: CPT | Mod: S$GLB,,, | Performed by: NURSE PRACTITIONER

## 2022-07-18 NOTE — PROGRESS NOTES
"Subjective:       Patient ID: Emmy Padilla is a 71 y.o. female.    Chief Complaint: Follow-up    71 year old female here for 6 month follow up  Has hx of ten, stable on cymbalta with clonazepam prn  Also has hx of ra, followed by outside rheum. Was on Rinvoq but stopped due to concerns she read about, seeing rheum tomorrow to discuss concerns  Had a lesion to the lower lip, oral surgery clipped it then derm (Dr. Spaulding)  biopsied it and it was determined to be basal cell carconoma. Having Mohs surgery     Follow-up  Pertinent negatives include no chest pain, chills, coughing, diaphoresis, fatigue, fever or rash.       /80   Pulse 66   Temp 98 °F (36.7 °C)   Ht 5' 3" (1.6 m)   Wt 54.6 kg (120 lb 5.9 oz)   BMI 21.32 kg/m²     Review of Systems   Constitutional: Negative for activity change, appetite change, chills, diaphoresis, fatigue, fever and unexpected weight change.   HENT: Negative.    Respiratory: Negative for cough and shortness of breath.    Cardiovascular: Negative for chest pain, palpitations and leg swelling.   Gastrointestinal: Negative.    Genitourinary: Negative.    Musculoskeletal: Negative.    Skin: Negative for color change, pallor, rash and wound.   Allergic/Immunologic: Negative for immunocompromised state.   Neurological: Negative.  Negative for dizziness and facial asymmetry.   Hematological: Negative for adenopathy. Does not bruise/bleed easily.   Psychiatric/Behavioral: Negative for agitation, behavioral problems and confusion.       Objective:      Physical Exam  Vitals and nursing note reviewed.   Constitutional:       General: She is not in acute distress.     Appearance: She is well-developed. She is not diaphoretic.   HENT:      Head: Normocephalic and atraumatic.   Eyes:      General:         Right eye: No discharge.         Left eye: No discharge.      Conjunctiva/sclera: Conjunctivae normal.   Cardiovascular:      Rate and Rhythm: Normal rate and regular rhythm.     "  Heart sounds: Normal heart sounds. No murmur heard.  Pulmonary:      Effort: Pulmonary effort is normal. No respiratory distress.      Breath sounds: Normal breath sounds. No wheezing or rales.   Chest:      Chest wall: No tenderness.   Abdominal:      General: There is no distension.      Palpations: Abdomen is soft.   Musculoskeletal:         General: Normal range of motion.   Skin:     General: Skin is warm and dry.      Findings: No rash.   Neurological:      Mental Status: She is alert and oriented to person, place, and time.   Psychiatric:         Behavior: Behavior normal. Behavior is cooperative.         Thought Content: Thought content normal.         Judgment: Judgment normal.         Assessment:       1. KATHY (generalized anxiety disorder)    2. Rheumatoid arthritis, involving unspecified site, unspecified whether rheumatoid factor present    3. Osteoporosis, unspecified osteoporosis type, unspecified pathological fracture presence    4. Hyperlipidemia, unspecified hyperlipidemia type        Plan:       Emmy was seen today for follow-up.    Diagnoses and all orders for this visit:    KATHY (generalized anxiety disorder)  -     CBC Auto Differential; Future  -     Comprehensive Metabolic Panel; Future  -     TSH; Future  -     Lipid Panel; Future  -     Hemoglobin A1C; Future  -     Vitamin D; Future  Stable on cymbalta with prn clonazepam    Rheumatoid arthritis, involving unspecified site, unspecified whether rheumatoid factor present  -     CBC Auto Differential; Future  -     Comprehensive Metabolic Panel; Future  -     TSH; Future  -     Lipid Panel; Future  -     Hemoglobin A1C; Future  -     Vitamin D; Future  Stable. Followed by rheum    Osteoporosis, unspecified osteoporosis type, unspecified pathological fracture presence  -     CBC Auto Differential; Future  -     Comprehensive Metabolic Panel; Future  -     TSH; Future  -     Lipid Panel; Future  -     Hemoglobin A1C; Future  -     Vitamin D;  Future  Stable    Hyperlipidemia, unspecified hyperlipidemia type  -     CBC Auto Differential; Future  -     Comprehensive Metabolic Panel; Future  -     TSH; Future  -     Lipid Panel; Future  -     Hemoglobin A1C; Future  -     Vitamin D; Future  Stable on statin  Lab Results   Component Value Date    LDLCALC 122.6 12/27/2021     Follow up Dr. BATEMAN 6 months for wellness with fasting lab prior

## 2022-10-10 ENCOUNTER — OFFICE VISIT (OUTPATIENT)
Dept: INTERNAL MEDICINE | Facility: CLINIC | Age: 72
End: 2022-10-10
Payer: MEDICARE

## 2022-10-10 VITALS
BODY MASS INDEX: 21.09 KG/M2 | WEIGHT: 119.06 LBS | DIASTOLIC BLOOD PRESSURE: 60 MMHG | OXYGEN SATURATION: 98 % | TEMPERATURE: 98 F | HEART RATE: 97 BPM | HEIGHT: 63 IN | SYSTOLIC BLOOD PRESSURE: 118 MMHG

## 2022-10-10 DIAGNOSIS — R31.9 URINARY TRACT INFECTION WITH HEMATURIA, SITE UNSPECIFIED: Primary | ICD-10-CM

## 2022-10-10 DIAGNOSIS — N39.0 URINARY TRACT INFECTION WITH HEMATURIA, SITE UNSPECIFIED: Primary | ICD-10-CM

## 2022-10-10 LAB
BILIRUB SERPL-MCNC: NEGATIVE MG/DL
BLOOD URINE, POC: ABNORMAL
CLARITY, POC UA: CLEAR
COLOR, POC UA: ABNORMAL
GLUCOSE UR QL STRIP: NORMAL
KETONES UR QL STRIP: NEGATIVE
LEUKOCYTE ESTERASE URINE, POC: ABNORMAL
NITRITE, POC UA: NEGATIVE
PH, POC UA: 6
PROTEIN, POC: ABNORMAL
SPECIFIC GRAVITY, POC UA: 1.02
UROBILINOGEN, POC UA: NORMAL

## 2022-10-10 PROCEDURE — 1101F PT FALLS ASSESS-DOCD LE1/YR: CPT | Mod: CPTII,S$GLB,, | Performed by: NURSE PRACTITIONER

## 2022-10-10 PROCEDURE — 1160F PR REVIEW ALL MEDS BY PRESCRIBER/CLIN PHARMACIST DOCUMENTED: ICD-10-PCS | Mod: CPTII,S$GLB,, | Performed by: NURSE PRACTITIONER

## 2022-10-10 PROCEDURE — 3074F SYST BP LT 130 MM HG: CPT | Mod: CPTII,S$GLB,, | Performed by: NURSE PRACTITIONER

## 2022-10-10 PROCEDURE — 81002 POCT URINE DIPSTICK WITHOUT MICROSCOPE: ICD-10-PCS | Mod: S$GLB,,, | Performed by: NURSE PRACTITIONER

## 2022-10-10 PROCEDURE — 3288F PR FALLS RISK ASSESSMENT DOCUMENTED: ICD-10-PCS | Mod: CPTII,S$GLB,, | Performed by: NURSE PRACTITIONER

## 2022-10-10 PROCEDURE — 1126F PR PAIN SEVERITY QUANTIFIED, NO PAIN PRESENT: ICD-10-PCS | Mod: CPTII,S$GLB,, | Performed by: NURSE PRACTITIONER

## 2022-10-10 PROCEDURE — 3288F FALL RISK ASSESSMENT DOCD: CPT | Mod: CPTII,S$GLB,, | Performed by: NURSE PRACTITIONER

## 2022-10-10 PROCEDURE — 1160F RVW MEDS BY RX/DR IN RCRD: CPT | Mod: CPTII,S$GLB,, | Performed by: NURSE PRACTITIONER

## 2022-10-10 PROCEDURE — 3074F PR MOST RECENT SYSTOLIC BLOOD PRESSURE < 130 MM HG: ICD-10-PCS | Mod: CPTII,S$GLB,, | Performed by: NURSE PRACTITIONER

## 2022-10-10 PROCEDURE — 3008F PR BODY MASS INDEX (BMI) DOCUMENTED: ICD-10-PCS | Mod: CPTII,S$GLB,, | Performed by: NURSE PRACTITIONER

## 2022-10-10 PROCEDURE — 3078F DIAST BP <80 MM HG: CPT | Mod: CPTII,S$GLB,, | Performed by: NURSE PRACTITIONER

## 2022-10-10 PROCEDURE — 99213 PR OFFICE/OUTPT VISIT, EST, LEVL III, 20-29 MIN: ICD-10-PCS | Mod: S$GLB,,, | Performed by: NURSE PRACTITIONER

## 2022-10-10 PROCEDURE — 99999 PR PBB SHADOW E&M-EST. PATIENT-LVL IV: ICD-10-PCS | Mod: PBBFAC,,, | Performed by: NURSE PRACTITIONER

## 2022-10-10 PROCEDURE — 1126F AMNT PAIN NOTED NONE PRSNT: CPT | Mod: CPTII,S$GLB,, | Performed by: NURSE PRACTITIONER

## 2022-10-10 PROCEDURE — 81002 URINALYSIS NONAUTO W/O SCOPE: CPT | Mod: S$GLB,,, | Performed by: NURSE PRACTITIONER

## 2022-10-10 PROCEDURE — 99999 PR PBB SHADOW E&M-EST. PATIENT-LVL IV: CPT | Mod: PBBFAC,,, | Performed by: NURSE PRACTITIONER

## 2022-10-10 PROCEDURE — 87086 URINE CULTURE/COLONY COUNT: CPT | Performed by: NURSE PRACTITIONER

## 2022-10-10 PROCEDURE — 1159F MED LIST DOCD IN RCRD: CPT | Mod: CPTII,S$GLB,, | Performed by: NURSE PRACTITIONER

## 2022-10-10 PROCEDURE — 3078F PR MOST RECENT DIASTOLIC BLOOD PRESSURE < 80 MM HG: ICD-10-PCS | Mod: CPTII,S$GLB,, | Performed by: NURSE PRACTITIONER

## 2022-10-10 PROCEDURE — 1101F PR PT FALLS ASSESS DOC 0-1 FALLS W/OUT INJ PAST YR: ICD-10-PCS | Mod: CPTII,S$GLB,, | Performed by: NURSE PRACTITIONER

## 2022-10-10 PROCEDURE — 99213 OFFICE O/P EST LOW 20 MIN: CPT | Mod: S$GLB,,, | Performed by: NURSE PRACTITIONER

## 2022-10-10 PROCEDURE — 1159F PR MEDICATION LIST DOCUMENTED IN MEDICAL RECORD: ICD-10-PCS | Mod: CPTII,S$GLB,, | Performed by: NURSE PRACTITIONER

## 2022-10-10 PROCEDURE — 3008F BODY MASS INDEX DOCD: CPT | Mod: CPTII,S$GLB,, | Performed by: NURSE PRACTITIONER

## 2022-10-10 RX ORDER — NITROFURANTOIN 25; 75 MG/1; MG/1
100 CAPSULE ORAL 2 TIMES DAILY
Qty: 14 CAPSULE | Refills: 0 | Status: SHIPPED | OUTPATIENT
Start: 2022-10-10 | End: 2022-10-17

## 2022-10-10 NOTE — PROGRESS NOTES
"Subjective:       Patient ID: Emmy Padilla is a 72 y.o. female.    Chief Complaint: Dysuria    Pt presents to clinic today for some dysuria   Started 2 days ago with some hesitancy with urination  Now with burning and voiding often  No history of chronic UTI  No fever or pelvic pain      /60   Pulse 97   Temp 97.5 °F (36.4 °C) (Temporal)   Ht 5' 3" (1.6 m)   Wt 54 kg (119 lb 0.8 oz)   SpO2 98%   BMI 21.09 kg/m²     Review of Systems   Constitutional:  Negative for appetite change, chills, diaphoresis, fatigue and fever.   HENT: Negative.     Eyes: Negative.  Negative for visual disturbance.   Respiratory:  Negative for cough, chest tightness, shortness of breath and wheezing.    Cardiovascular:  Negative for chest pain, palpitations and leg swelling.   Gastrointestinal:  Negative for abdominal distention, abdominal pain, blood in stool, constipation, diarrhea, nausea and vomiting.   Endocrine: Negative.    Genitourinary:  Positive for dysuria, frequency and urgency. Negative for decreased urine volume, difficulty urinating, dyspareunia, enuresis, flank pain, genital sores, hematuria, menstrual problem, pelvic pain, vaginal discharge and vaginal pain.   Musculoskeletal:  Negative for back pain and joint swelling.   Skin:  Negative for color change and rash.   Allergic/Immunologic: Negative for environmental allergies, food allergies and immunocompromised state.   Neurological: Negative.  Negative for dizziness, syncope, speech difficulty, light-headedness and headaches.   Hematological:  Negative for adenopathy. Does not bruise/bleed easily.   Psychiatric/Behavioral:  Negative for agitation, confusion and hallucinations. The patient is not nervous/anxious.      Objective:      Physical Exam  Vitals and nursing note reviewed.   Constitutional:       General: She is not in acute distress.     Appearance: Normal appearance. She is well-developed. She is not diaphoretic.   HENT:      Head: " Normocephalic and atraumatic.      Right Ear: External ear normal.      Left Ear: External ear normal.      Nose: Nose normal.   Eyes:      General:         Right eye: No discharge.         Left eye: No discharge.      Conjunctiva/sclera: Conjunctivae normal.   Cardiovascular:      Rate and Rhythm: Normal rate and regular rhythm.      Heart sounds: Normal heart sounds. No murmur heard.  Pulmonary:      Effort: Pulmonary effort is normal. No respiratory distress.      Breath sounds: Normal breath sounds. No wheezing or rales.   Chest:      Chest wall: No tenderness.   Abdominal:      General: There is no distension.      Palpations: Abdomen is soft.      Tenderness: There is no abdominal tenderness. There is no right CVA tenderness or left CVA tenderness.      Hernia: No hernia is present.   Musculoskeletal:         General: No tenderness. Normal range of motion.      Cervical back: Normal range of motion.   Skin:     General: Skin is warm and dry.      Findings: No erythema or rash.   Neurological:      Mental Status: She is alert and oriented to person, place, and time.   Psychiatric:         Behavior: Behavior normal.         Thought Content: Thought content normal.         Judgment: Judgment normal.       Assessment:       1. Urinary tract infection with hematuria, site unspecified    2. BMI 21.0-21.9, adult          Plan:       Emmy was seen today for dysuria.    Diagnoses and all orders for this visit:    Urinary tract infection with hematuria, site unspecified  -     POCT URINE DIPSTICK WITHOUT MICROSCOPE  -     Urine culture  -     nitrofurantoin, macrocrystal-monohydrate, (MACROBID) 100 MG capsule; Take 1 capsule (100 mg total) by mouth 2 (two) times daily. for 7 days  - Increase fluids, void often, will follow culture     BMI 21.0-21.9, adult  Follow up for worsening or no improvement in symptoms and PRN.

## 2022-10-12 LAB — BACTERIA UR CULT: NORMAL

## 2022-12-27 ENCOUNTER — OFFICE VISIT (OUTPATIENT)
Dept: INTERNAL MEDICINE | Facility: CLINIC | Age: 72
End: 2022-12-27
Payer: MEDICARE

## 2022-12-27 VITALS
TEMPERATURE: 97 F | WEIGHT: 116.88 LBS | DIASTOLIC BLOOD PRESSURE: 60 MMHG | HEIGHT: 63 IN | BODY MASS INDEX: 20.71 KG/M2 | HEART RATE: 88 BPM | SYSTOLIC BLOOD PRESSURE: 116 MMHG

## 2022-12-27 DIAGNOSIS — U07.1 COVID-19: Primary | ICD-10-CM

## 2022-12-27 LAB
CTP QC/QA: YES
CTP QC/QA: YES
POC MOLECULAR INFLUENZA A AGN: NEGATIVE
POC MOLECULAR INFLUENZA B AGN: NEGATIVE
SARS-COV-2 RDRP RESP QL NAA+PROBE: POSITIVE

## 2022-12-27 PROCEDURE — 3078F DIAST BP <80 MM HG: CPT | Mod: HCNC,CPTII,S$GLB, | Performed by: NURSE PRACTITIONER

## 2022-12-27 PROCEDURE — 1101F PT FALLS ASSESS-DOCD LE1/YR: CPT | Mod: HCNC,CPTII,S$GLB, | Performed by: NURSE PRACTITIONER

## 2022-12-27 PROCEDURE — 87502 POCT INFLUENZA A/B MOLECULAR: ICD-10-PCS | Mod: QW,HCNC,S$GLB, | Performed by: NURSE PRACTITIONER

## 2022-12-27 PROCEDURE — 99999 PR PBB SHADOW E&M-EST. PATIENT-LVL III: CPT | Mod: PBBFAC,HCNC,, | Performed by: NURSE PRACTITIONER

## 2022-12-27 PROCEDURE — 99999 PR PBB SHADOW E&M-EST. PATIENT-LVL III: ICD-10-PCS | Mod: PBBFAC,HCNC,, | Performed by: NURSE PRACTITIONER

## 2022-12-27 PROCEDURE — 3008F BODY MASS INDEX DOCD: CPT | Mod: HCNC,CPTII,S$GLB, | Performed by: NURSE PRACTITIONER

## 2022-12-27 PROCEDURE — 1159F PR MEDICATION LIST DOCUMENTED IN MEDICAL RECORD: ICD-10-PCS | Mod: HCNC,CPTII,S$GLB, | Performed by: NURSE PRACTITIONER

## 2022-12-27 PROCEDURE — 1160F PR REVIEW ALL MEDS BY PRESCRIBER/CLIN PHARMACIST DOCUMENTED: ICD-10-PCS | Mod: HCNC,CPTII,S$GLB, | Performed by: NURSE PRACTITIONER

## 2022-12-27 PROCEDURE — 3008F PR BODY MASS INDEX (BMI) DOCUMENTED: ICD-10-PCS | Mod: HCNC,CPTII,S$GLB, | Performed by: NURSE PRACTITIONER

## 2022-12-27 PROCEDURE — 1126F AMNT PAIN NOTED NONE PRSNT: CPT | Mod: HCNC,CPTII,S$GLB, | Performed by: NURSE PRACTITIONER

## 2022-12-27 PROCEDURE — 3078F PR MOST RECENT DIASTOLIC BLOOD PRESSURE < 80 MM HG: ICD-10-PCS | Mod: HCNC,CPTII,S$GLB, | Performed by: NURSE PRACTITIONER

## 2022-12-27 PROCEDURE — 3288F PR FALLS RISK ASSESSMENT DOCUMENTED: ICD-10-PCS | Mod: HCNC,CPTII,S$GLB, | Performed by: NURSE PRACTITIONER

## 2022-12-27 PROCEDURE — 1159F MED LIST DOCD IN RCRD: CPT | Mod: HCNC,CPTII,S$GLB, | Performed by: NURSE PRACTITIONER

## 2022-12-27 PROCEDURE — 3288F FALL RISK ASSESSMENT DOCD: CPT | Mod: HCNC,CPTII,S$GLB, | Performed by: NURSE PRACTITIONER

## 2022-12-27 PROCEDURE — 3074F SYST BP LT 130 MM HG: CPT | Mod: HCNC,CPTII,S$GLB, | Performed by: NURSE PRACTITIONER

## 2022-12-27 PROCEDURE — 1101F PR PT FALLS ASSESS DOC 0-1 FALLS W/OUT INJ PAST YR: ICD-10-PCS | Mod: HCNC,CPTII,S$GLB, | Performed by: NURSE PRACTITIONER

## 2022-12-27 PROCEDURE — 87635: ICD-10-PCS | Mod: QW,HCNC,S$GLB, | Performed by: NURSE PRACTITIONER

## 2022-12-27 PROCEDURE — 87502 INFLUENZA DNA AMP PROBE: CPT | Mod: QW,HCNC,S$GLB, | Performed by: NURSE PRACTITIONER

## 2022-12-27 PROCEDURE — 99213 PR OFFICE/OUTPT VISIT, EST, LEVL III, 20-29 MIN: ICD-10-PCS | Mod: HCNC,S$GLB,, | Performed by: NURSE PRACTITIONER

## 2022-12-27 PROCEDURE — 99213 OFFICE O/P EST LOW 20 MIN: CPT | Mod: HCNC,S$GLB,, | Performed by: NURSE PRACTITIONER

## 2022-12-27 PROCEDURE — 1160F RVW MEDS BY RX/DR IN RCRD: CPT | Mod: HCNC,CPTII,S$GLB, | Performed by: NURSE PRACTITIONER

## 2022-12-27 PROCEDURE — 1126F PR PAIN SEVERITY QUANTIFIED, NO PAIN PRESENT: ICD-10-PCS | Mod: HCNC,CPTII,S$GLB, | Performed by: NURSE PRACTITIONER

## 2022-12-27 PROCEDURE — 87635 SARS-COV-2 COVID-19 AMP PRB: CPT | Mod: QW,HCNC,S$GLB, | Performed by: NURSE PRACTITIONER

## 2022-12-27 PROCEDURE — 3074F PR MOST RECENT SYSTOLIC BLOOD PRESSURE < 130 MM HG: ICD-10-PCS | Mod: HCNC,CPTII,S$GLB, | Performed by: NURSE PRACTITIONER

## 2022-12-27 RX ORDER — PROMETHAZINE HYDROCHLORIDE AND DEXTROMETHORPHAN HYDROBROMIDE 6.25; 15 MG/5ML; MG/5ML
5 SYRUP ORAL NIGHTLY PRN
Qty: 120 ML | Refills: 0 | Status: SHIPPED | OUTPATIENT
Start: 2022-12-27 | End: 2023-01-19

## 2022-12-27 NOTE — PROGRESS NOTES
"Subjective:       Patient ID: Emmy Padilla is a 72 y.o. female.    Chief Complaint: Generalized Body Aches, Fever, Cough, and Sinus Problem    Patient here for Upper Respiratory Infection  x 5 days  Started last Friday with congestion, had fever fri and sat 101.7 max  She reports nasal congestion, post nasal drip, cough  She feels tired, no energy as well as decreased appetite  She has been using flonase and nasal rinse    Fever   Associated symptoms include congestion and coughing.   Cough  Associated symptoms include chills and a fever.   Sinus Problem  Associated symptoms include chills, congestion and coughing.     /60   Pulse 88   Temp 96.7 °F (35.9 °C) (Temporal)   Ht 5' 3" (1.6 m)   Wt 53 kg (116 lb 13.5 oz)   BMI 20.70 kg/m²     Review of Systems   Constitutional:  Positive for activity change, chills, fatigue and fever.   HENT:  Positive for congestion.    Respiratory:  Positive for cough.      Objective:      Physical Exam  Vitals and nursing note reviewed.   Constitutional:       Appearance: She is well-developed. She is not diaphoretic.   HENT:      Head: Normocephalic and atraumatic.      Right Ear: Tympanic membrane, ear canal and external ear normal.      Left Ear: Tympanic membrane, ear canal and external ear normal.      Nose: Mucosal edema and rhinorrhea present.      Right Sinus: No maxillary sinus tenderness or frontal sinus tenderness.      Left Sinus: No maxillary sinus tenderness or frontal sinus tenderness.      Mouth/Throat:      Pharynx: Uvula midline. No oropharyngeal exudate or posterior oropharyngeal erythema.   Eyes:      General:         Right eye: No discharge.         Left eye: No discharge.      Conjunctiva/sclera: Conjunctivae normal.   Cardiovascular:      Rate and Rhythm: Normal rate and regular rhythm.      Heart sounds: Normal heart sounds. No murmur heard.  Pulmonary:      Effort: Pulmonary effort is normal. No accessory muscle usage or respiratory " distress.      Breath sounds: Normal breath sounds. No decreased breath sounds, wheezing, rhonchi or rales.   Chest:      Chest wall: No tenderness.   Abdominal:      General: There is no distension.      Palpations: Abdomen is soft.   Musculoskeletal:         General: Normal range of motion.      Cervical back: Normal range of motion.   Skin:     General: Skin is warm and dry.   Neurological:      Mental Status: She is alert and oriented to person, place, and time.   Psychiatric:         Behavior: Behavior normal.       Assessment:       1. COVID-19          Plan:       Emmy was seen today for generalized body aches, fever, cough and sinus problem.    Diagnoses and all orders for this visit:    COVID-19  -     POCT Influenza A/B Molecular  -     POCT COVID-19 Rapid Screening    Other orders  -     promethazine-dextromethorphan (PROMETHAZINE-DM) 6.25-15 mg/5 mL Syrp; Take 5 mLs by mouth nightly as needed (Cough).      Flu Negative  Covid positive   recommended rest, hydration, supportive care, deep breathing  CDC recommended quarantine discussed  Infection precautions discussed  Vitamins c, d, zinc discussed  mucinex with lots of fluids  Emergency Room if shortness of breath, issues breathing

## 2022-12-31 ENCOUNTER — NURSE TRIAGE (OUTPATIENT)
Dept: ADMINISTRATIVE | Facility: CLINIC | Age: 72
End: 2022-12-31
Payer: MEDICARE

## 2022-12-31 ENCOUNTER — OFFICE VISIT (OUTPATIENT)
Dept: URGENT CARE | Facility: CLINIC | Age: 72
End: 2022-12-31
Payer: MEDICARE

## 2022-12-31 VITALS
SYSTOLIC BLOOD PRESSURE: 123 MMHG | HEART RATE: 86 BPM | HEIGHT: 63 IN | DIASTOLIC BLOOD PRESSURE: 58 MMHG | OXYGEN SATURATION: 96 % | RESPIRATION RATE: 18 BRPM | BODY MASS INDEX: 20.71 KG/M2 | WEIGHT: 116.88 LBS | TEMPERATURE: 98 F

## 2022-12-31 DIAGNOSIS — R19.7 DIARRHEA, UNSPECIFIED TYPE: Primary | ICD-10-CM

## 2022-12-31 DIAGNOSIS — F17.200 NEEDS SMOKING CESSATION EDUCATION: ICD-10-CM

## 2022-12-31 PROBLEM — Z79.899 LONG TERM CURRENT USE OF IMMUNOSUPPRESSIVE DRUG: Status: ACTIVE | Noted: 2022-07-28

## 2022-12-31 PROBLEM — C44.01 BASAL CELL CARCINOMA OF LOWER LIP: Status: ACTIVE | Noted: 2022-06-27

## 2022-12-31 PROCEDURE — 1159F MED LIST DOCD IN RCRD: CPT | Mod: CPTII,S$GLB,, | Performed by: EMERGENCY MEDICINE

## 2022-12-31 PROCEDURE — 3074F PR MOST RECENT SYSTOLIC BLOOD PRESSURE < 130 MM HG: ICD-10-PCS | Mod: CPTII,S$GLB,, | Performed by: EMERGENCY MEDICINE

## 2022-12-31 PROCEDURE — 1126F PR PAIN SEVERITY QUANTIFIED, NO PAIN PRESENT: ICD-10-PCS | Mod: CPTII,S$GLB,, | Performed by: EMERGENCY MEDICINE

## 2022-12-31 PROCEDURE — 3074F SYST BP LT 130 MM HG: CPT | Mod: CPTII,S$GLB,, | Performed by: EMERGENCY MEDICINE

## 2022-12-31 PROCEDURE — 1160F RVW MEDS BY RX/DR IN RCRD: CPT | Mod: CPTII,S$GLB,, | Performed by: EMERGENCY MEDICINE

## 2022-12-31 PROCEDURE — 3008F PR BODY MASS INDEX (BMI) DOCUMENTED: ICD-10-PCS | Mod: CPTII,S$GLB,, | Performed by: EMERGENCY MEDICINE

## 2022-12-31 PROCEDURE — 99213 OFFICE O/P EST LOW 20 MIN: CPT | Mod: S$GLB,,, | Performed by: EMERGENCY MEDICINE

## 2022-12-31 PROCEDURE — 1160F PR REVIEW ALL MEDS BY PRESCRIBER/CLIN PHARMACIST DOCUMENTED: ICD-10-PCS | Mod: CPTII,S$GLB,, | Performed by: EMERGENCY MEDICINE

## 2022-12-31 PROCEDURE — 99213 PR OFFICE/OUTPT VISIT, EST, LEVL III, 20-29 MIN: ICD-10-PCS | Mod: S$GLB,,, | Performed by: EMERGENCY MEDICINE

## 2022-12-31 PROCEDURE — 1126F AMNT PAIN NOTED NONE PRSNT: CPT | Mod: CPTII,S$GLB,, | Performed by: EMERGENCY MEDICINE

## 2022-12-31 PROCEDURE — 3008F BODY MASS INDEX DOCD: CPT | Mod: CPTII,S$GLB,, | Performed by: EMERGENCY MEDICINE

## 2022-12-31 PROCEDURE — 3078F DIAST BP <80 MM HG: CPT | Mod: CPTII,S$GLB,, | Performed by: EMERGENCY MEDICINE

## 2022-12-31 PROCEDURE — 1159F PR MEDICATION LIST DOCUMENTED IN MEDICAL RECORD: ICD-10-PCS | Mod: CPTII,S$GLB,, | Performed by: EMERGENCY MEDICINE

## 2022-12-31 PROCEDURE — 3078F PR MOST RECENT DIASTOLIC BLOOD PRESSURE < 80 MM HG: ICD-10-PCS | Mod: CPTII,S$GLB,, | Performed by: EMERGENCY MEDICINE

## 2022-12-31 RX ORDER — CLONAZEPAM 1 MG/1
1 TABLET ORAL NIGHTLY
COMMUNITY
End: 2023-02-16 | Stop reason: SDUPTHER

## 2022-12-31 RX ORDER — NAPROXEN SODIUM 220 MG
220 TABLET ORAL 2 TIMES DAILY PRN
COMMUNITY
End: 2023-01-19

## 2022-12-31 RX ORDER — ONDANSETRON 4 MG/1
TABLET, FILM COATED ORAL
COMMUNITY
Start: 2022-08-22 | End: 2023-01-19

## 2022-12-31 RX ORDER — CYCLOSPORINE 0.5 MG/ML
EMULSION OPHTHALMIC
COMMUNITY
Start: 2022-10-18

## 2022-12-31 RX ORDER — TRAMADOL HYDROCHLORIDE 50 MG/1
50 TABLET ORAL EVERY 8 HOURS PRN
COMMUNITY
Start: 2022-07-28 | End: 2023-01-19

## 2022-12-31 RX ORDER — DULOXETIN HYDROCHLORIDE 60 MG/1
1 CAPSULE, DELAYED RELEASE ORAL NIGHTLY
COMMUNITY
End: 2023-01-19

## 2022-12-31 NOTE — TELEPHONE ENCOUNTER
Pt states Covid positive 12/27/22, denies SOB and fever at this time. Pt reports diarrhea since Wednesday, diarrhea x2 today, describes stool as yellow, denies blood, Pt states decreased appetite, eating crackers, sipping water. Pt concerned about dehydration. Pt states not sure if anxiety or lightheaded, also reports very dry mouth. Per care advice, advised to go to ED now. Discussed UC/ED. Advised pt to call back for any further questions or concerns. Pt verbalizes understanding.   Reason for Disposition   [1] Drinking very little AND [2] dehydration suspected (e.g., no urine > 12 hours, very dry mouth, very lightheaded)   [1] COVID-19 diagnosed AND [2] has mild nausea, vomiting or diarrhea    Additional Information   Negative: SEVERE difficulty breathing (e.g., struggling for each breath, speaks in single words)   Negative: Difficult to awaken or acting confused (e.g., disoriented, slurred speech)   Negative: Bluish (or gray) lips or face now   Negative: Shock suspected (e.g., cold/pale/clammy skin, too weak to stand, low BP, rapid pulse)   Negative: Sounds like a life-threatening emergency to the triager   Negative: SEVERE or constant chest pain or pressure  (Exception: Mild central chest pain, present only when coughing.)   Negative: MODERATE difficulty breathing (e.g., speaks in phrases, SOB even at rest, pulse 100-120)   Negative: [1] Headache AND [2] stiff neck (can't touch chin to chest)   Negative: Oxygen level (e.g., pulse oximetry) 90 percent or lower   Negative: Chest pain or pressure  (Exception: MILD central chest pain, present only when coughing)   Negative: Patient sounds very sick or weak to the triager   Negative: MILD difficulty breathing (e.g., minimal/no SOB at rest, SOB with walking, pulse <100)   Negative: Fever > 103 F (39.4 C)   Negative: [1] Fever > 101 F (38.3 C) AND [2] age > 60 years   Negative: [1] Fever > 100.0 F (37.8 C) AND [2] bedridden (e.g., CVA, chronic illness, recovering from  surgery)   Negative: Oxygen level (e.g., pulse oximetry) 91 to 94 percent   Negative: [1] HIGH RISK for severe COVID complications (e.g., weak immune system, age > 64 years, obesity with BMI 30 or higher, pregnant, chronic lung disease or other chronic medical condition) AND [2] COVID symptoms (e.g., cough, fever)  (Exceptions: Already seen by PCP and no new or worsening symptoms.)   Negative: [1] HIGH RISK patient AND [2] influenza is widespread in the community AND [3] ONE OR MORE respiratory symptoms: cough, sore throat, runny or stuffy nose   Negative: [1] HIGH RISK patient AND [2] influenza exposure within the last 7 days AND [3] ONE OR MORE respiratory symptoms: cough, sore throat, runny or stuffy nose   Negative: Fever present > 3 days (72 hours)   Negative: [1] Fever returns after gone for over 24 hours AND [2] symptoms worse or not improved   Negative: [1] Continuous (nonstop) coughing interferes with work or school AND [2] no improvement using cough treatment per Care Advice   Negative: [1] COVID-19 infection suspected by caller or triager AND [2] mild symptoms (cough, fever, or others) AND [3] negative COVID-19 rapid test   Negative: Cough present > 3 weeks   Negative: [1] COVID-19 diagnosed by positive lab test (e.g., PCR, rapid self-test kit) AND [2] NO symptoms (e.g., cough, fever, others)   Negative: [1] COVID-19 diagnosed by positive lab test (e.g., PCR, rapid self-test kit) AND [2] mild symptoms (e.g., cough, fever, others) AND [3] no complications or SOB   Negative: [1] COVID-19 diagnosed by doctor (or NP/PA) AND [2] mild symptoms (e.g., cough, fever, others) AND [3] no complications or SOB   Negative: Shock suspected (e.g., cold/pale/clammy skin, too weak to stand, low BP, rapid pulse)   Negative: Difficult to awaken or acting confused (e.g., disoriented, slurred speech)   Negative: Sounds like a life-threatening emergency to the triager   Negative: [1] SEVERE abdominal pain (e.g., excruciating)  AND [2] present > 1 hour   Negative: [1] SEVERE abdominal pain AND [2] age > 60 years   Negative: [1] Blood in the stool AND [2] moderate or large amount of blood   Negative: Black or tarry bowel movements (Exception: chronic-unchanged black-grey bowel movements AND is taking iron pills or Pepto-bismol)    Protocols used: Coronavirus (COVID-19) Diagnosed or [id number], Diarrhea-A-AH

## 2022-12-31 NOTE — PROGRESS NOTES
"Subjective:       Patient ID: Emmy Padilla is a 72 y.o. female.    Vitals:  height is 5' 3" (1.6 m) and weight is 53 kg (116 lb 13.5 oz). Her oral temperature is 98.2 °F (36.8 °C). Her blood pressure is 123/58 (abnormal) and her pulse is 86. Her respiration is 18 and oxygen saturation is 96%.     Chief Complaint: COVID-19 Concerns (Pt stated stomach ache, fever, headaches, and body aches x 5 days )    Pt stated diarrhea, thirst, possible dehydration. +covid test last week.  Patient states that today she has only had 2 watery stools.  No blood or mucus reported.  Patient reports that typically when she eats something it "gross straight through".  No cramping until it is time to have a bowel movement.  Patient denies nausea or vomiting.  States that she has not felt while so her appetite has been poor and she has not been diligent about drinking extra fluids.  She is also been drinking juices.  Patient denies dizziness with standing or postural changes.    Diarrhea   This is a new problem. The current episode started in the past 7 days. The problem occurs 2 to 4 times per day. The problem has been unchanged. The stool consistency is described as Mucous and watery. The patient states that diarrhea awakens her from sleep. Associated symptoms include abdominal pain, coughing, a fever, headaches and myalgias. Nothing aggravates the symptoms. She has tried nothing for the symptoms. The treatment provided no relief.     Constitution: Positive for activity change, appetite change, fatigue and fever.   Respiratory:  Positive for cough.    Gastrointestinal:  Positive for abdominal pain and diarrhea. Negative for bright red blood in stool.   Musculoskeletal:  Positive for muscle ache.   Neurological:  Positive for headaches.     Objective:      Physical Exam   Constitutional: She is oriented to person, place, and time. She does not appear ill. No distress.   HENT:   Head: Normocephalic and atraumatic.   Mouth/Throat: " Mucous membranes are dry.   Eyes: Extraocular movement intact   Cardiovascular: Normal rate, regular rhythm and normal pulses.   Pulmonary/Chest: Effort normal and breath sounds normal.   Abdominal: Normal appearance.   Neurological: She is alert and oriented to person, place, and time.   Skin: Skin is warm and dry.   Psychiatric: Her behavior is normal.   Nursing note and vitals reviewed.      Assessment:       1. Diarrhea, unspecified type        Patient likely with some diarrhea secondary to viral illness.  Could also be due to postnasal drainage.  No signs of more concerning process like diverticulitis or other colitis.  Bowel sounds are quiet on exam and there is no tenderness in the abdomen at this time.  We discussed appropriate dietary additions and fluid replacement.  Also discussed use of over-the-counter medication like Imodium AD.  Patient verbalizes understanding of and agreement with this plan  Plan:         Diarrhea, unspecified type

## 2022-12-31 NOTE — PATIENT INSTRUCTIONS
Avoid fruit juice, dairy, gluten.    Brat diet:  Bananas, rice, applesauce, toast.  Of these, warm rice with a pat of butter is very easy to digest and beneficial for the GI tract with diarrhea.    Increase water over the next few days.  You can also consider drinking coconut water for electrolyte replacement.  If you use an electrolyte drink like Gatorade or Powerade, water down half water and half electrolyte solution.  Consider use of Imodium AD if you have more than 8 episodes of diarrhea a day.      Diarrhea in Adolescents and Adults   The Basics   Written by the doctors and editors at [facility]   What is diarrhea? -- Diarrhea describes bowel movements that are runny or watery, and happen 3 or more times in a day. Diarrhea is very common. Most adolescents and adults have diarrhea about 4 times a year. Just about everyone has it at some point.  What causes diarrhea? -- Diarrhea can be caused by:  Viruses  Bacteria that live in food or water  Parasites, such as tiny worms that you can catch in some countries  Side effects from some medicines  Problems digesting certain types of food  Diseases that harm the digestive system (figure 1)  Is there anything I can do on my own to get better? -- Yes. Here are some things you can try at home:  Drink a lot of liquids that have water, salt, and sugar. Good choices are water mixed with juice, flavored soda, and soup broth. If you are drinking enough fluids, your urine will be light yellow or almost clear.  Try to eat a little food. Good choices are potatoes, noodles, rice, oatmeal, crackers, bananas, soup, and boiled vegetables. Salty foods also help.  Should I see a doctor or nurse? -- See your doctor or nurse if:  You have more than 6 runny bowel movements in 24 hours  You have blood in your bowel movements   You have a fever higher than 101.3ºF (38.5ºC) that does not go away after a day  You have severe belly pain  You are 70 or older  Your body has lost too much water.  "This is called "dehydration." Signs include:  Lots of diarrhea that is very watery  Feeling very tired  Thirst  Dry mouth or tongue  Muscle cramps  Dizziness  Confusion  Urine that is very yellow, or not needing to urinate for more than 5 hours  Will I need tests? -- Many people do not need to have tests. But it's possible that your doctor will do tests to check if you are dehydrated or to figure out what is causing your diarrhea. Your doctor might do:  Blood tests  Tests on a sample of your bowel movements  How is diarrhea treated? -- That depends on what is causing your diarrhea. You might not need any treatment. If you do, your doctor might recommend:  Fluids through an "IV" - An IV is a thin tube that goes into your vein. People with a lot of diarrhea might need IV fluids to treat or prevent dehydration.  Stopping some of your medicines  Changing the foods you eat  Antibiotics - These medicines treat bacterial infections. Most people do not need antibiotics, even if they have a bacterial infection. If you are very sick with fever and blood in your bowel movements, your doctor might prescribe antibiotics to help you get better faster.   Medicines that ease diarrhea - These medicines include loperamide (brand name: Imodium), diphenoxylate-atropine (brand name: Lomotil), and bismuth subsalicylate (brand names: Pepto-Bismol, Kaopectate). You should not take loperamide or diphenoxylate-atropine if you have a fever or blood in your bowel movements. Also, taking too much loperamide has led to serious heart problems in some people. If you have health problems or already take other medicines, talk to your doctor or nurse before trying loperamide. For all of these medicines, it's important to not take more than the label tells you to.   Can diarrhea be prevented? -- You can reduce your chances of getting and spreading diarrhea by:  Washing your hands after changing diapers, cooking, eating, going to the bathroom, taking " "out the trash, touching animals, and blowing your nose.  Staying home from work or school until you feel better.  Paying attention to food safety. Tips include:  Not drinking unpasteurized milk or foods made with it  Washing fruits and vegetables well before eating them  Keeping the refrigerator colder than 40ºF and the freezer below 0ºF  Cooking meat and seafood until well done  Cooking eggs until the yolk is firm  Washing hands, knives, and cutting boards after they touch raw food  For more tips on food safety, see the table (table 1).  All topics are updated as new evidence becomes available and our peer review process is complete.  This topic retrieved from [employer] on: Sep 21, 2021.  Topic 99352 Version 14.0  Release: 29.4.2 - C29.263  © 2021 UpToDate, Inc. and/or its affiliates. All rights reserved.  figure 1: Digestive system     This drawing shows the organs in the body that process food. Together these organs are called "the digestive system," or "digestive tract." As food travels through this system, the body absorbs nutrients and water.  Graphic 09114 Version 4.0     table 1: Tips for safe food handling[1]  Purchase    Do not buy already-cooked food that is stored next to raw food, even if it is stored on ice.   Do not buy food in cans that are dented, cracked, or have a bulging lid.   Storage    Make sure meat and poultry products are refrigerated when bought.   Use plastic bags to keep juices from meat and fish from touching other foods.   Store perishable items (that can go bad quickly) in the refrigerator within an hour of buying.   Keep refrigerator temperature between 32 and 40°F (0 and 4°C) and freezer temperature at or below 0°F (-18°C).   Freeze meat and poultry that will not be cooked within 48 hours.   Freeze tuna, bluefish, and robert-robert that will not be cooked within 24 hours. Other fish can be stored in the refrigerator for 48 hours.   Do not store eggs on the refrigerator door (since that " is the warmest part of the refrigerator).   Put leftovers in the refrigerator within 2 hours of cooking them.   Divide leftovers into parts and store in small containers.   Reheat leftovers to 165°F (74°C) before eating.   Preparation    Wash hands with soap and water before cooking and after handling raw meat, poultry, fish, or raw eggs.   Thaw frozen meats and fish in the refrigerator or microwave, not by leaving them out.   Marinate foods in the refrigerator, not at room temperature.   Avoid contact of cooked foods with forks, spoons, knives, plates, or areas that might not be clean.   Wash forks, spoons, knives, plates, and cutting areas with soap and water after they have touched raw meat, poultry, fish, or eggs.   Avoid letting the juices from uncooked meat, poultry, or fish touch cooked foods or foods that will be eaten raw.   Carefully wash all fresh fruits and vegetables.   Avoid recipes that include raw eggs.   Cooking    Use a meat thermometer.  Cook beef, veal, and lamb (steaks, roasts, chops) to 145°F (63°C) and rest for 3 minutes.   Cook ground beef, pork, veal, and lamb to 160°F (71°C).   Cook poultry (chicken, turkey) to 165°F (74°C).   Cook fresh pork (roasts, chops, ham that is not precooked) to 145°F (63°C) and rest for 3 minutes.   Cook precooked ham to 140°F (60°C).   Cook fish until the flesh is firm and separates easily with a fork.   Cook shellfish until the flesh is firm.    Cook eggs until the yolk and white are firm.   Boil juices from raw meat or fish before using on cooked food.   Serving    Serve cooked foods on clean plates with clean forks, spoons, and knives.   Keep hot foods at 140°F (60°C) and cold foods below 40°F (4°C).   Never leave foods at room temperature longer than 2 hours, or 1 hour if the room is hotter than 90°F (32°C).   Use coolers and ice packs to take perishable foods (that might go bad) away from home.   Graphic 57622 Version 7.0  Consumer Information Use and  Disclaimer   This information is not specific medical advice and does not replace information you receive from your health care provider. This is only a brief summary of general information. It does NOT include all information about conditions, illnesses, injuries, tests, procedures, treatments, therapies, discharge instructions or life-style choices that may apply to you. You must talk with your health care provider for complete information about your health and treatment options. This information should not be used to decide whether or not to accept your health care provider's advice, instructions or recommendations. Only your health care provider has the knowledge and training to provide advice that is right for you. The use of this information is governed by the [employer] End User License Agreement, available at [email or web address].The use of "[employer]" content is governed by the "[employer]" Terms of Use. ©2021 [employer] All rights reserved.  Copyright   © 2021 UpToDate, Inc. and/or its affiliates. All rights reserved.

## 2023-01-01 ENCOUNTER — TELEPHONE (OUTPATIENT)
Dept: URGENT CARE | Facility: CLINIC | Age: 73
End: 2023-01-01
Payer: MEDICARE

## 2023-01-01 NOTE — TELEPHONE ENCOUNTER
Called to check on patient.  She states that she was able to eat some warm rice last night when she got home.  She has not had any episodes of diarrhea and is feeling better today.  We reviewed COVID quarantine instructions.

## 2023-01-03 NOTE — TELEPHONE ENCOUNTER
Called patient to check on her she advised that she went to Urgent Care to be evaluated and treated. She is not having diarrhea anymore. She is getting better. She complains of congestion and cough.

## 2023-01-12 ENCOUNTER — LAB VISIT (OUTPATIENT)
Dept: LAB | Facility: HOSPITAL | Age: 73
End: 2023-01-12
Attending: FAMILY MEDICINE
Payer: MEDICARE

## 2023-01-12 DIAGNOSIS — E78.5 HYPERLIPIDEMIA, UNSPECIFIED HYPERLIPIDEMIA TYPE: ICD-10-CM

## 2023-01-12 DIAGNOSIS — M06.9 RHEUMATOID ARTHRITIS, INVOLVING UNSPECIFIED SITE, UNSPECIFIED WHETHER RHEUMATOID FACTOR PRESENT: Chronic | ICD-10-CM

## 2023-01-12 DIAGNOSIS — M81.0 OSTEOPOROSIS, UNSPECIFIED OSTEOPOROSIS TYPE, UNSPECIFIED PATHOLOGICAL FRACTURE PRESENCE: ICD-10-CM

## 2023-01-12 DIAGNOSIS — F41.1 GAD (GENERALIZED ANXIETY DISORDER): ICD-10-CM

## 2023-01-12 LAB
25(OH)D3+25(OH)D2 SERPL-MCNC: 32 NG/ML (ref 30–96)
ALBUMIN SERPL BCP-MCNC: 3.1 G/DL (ref 3.5–5.2)
ALP SERPL-CCNC: 111 U/L (ref 55–135)
ALT SERPL W/O P-5'-P-CCNC: 15 U/L (ref 10–44)
ANION GAP SERPL CALC-SCNC: 10 MMOL/L (ref 8–16)
AST SERPL-CCNC: 19 U/L (ref 10–40)
BASOPHILS # BLD AUTO: 0.07 K/UL (ref 0–0.2)
BASOPHILS NFR BLD: 1.4 % (ref 0–1.9)
BILIRUB SERPL-MCNC: 0.3 MG/DL (ref 0.1–1)
BUN SERPL-MCNC: 17 MG/DL (ref 8–23)
CALCIUM SERPL-MCNC: 9.1 MG/DL (ref 8.7–10.5)
CHLORIDE SERPL-SCNC: 107 MMOL/L (ref 95–110)
CHOLEST SERPL-MCNC: 190 MG/DL (ref 120–199)
CHOLEST/HDLC SERPL: 3.5 {RATIO} (ref 2–5)
CO2 SERPL-SCNC: 23 MMOL/L (ref 23–29)
CREAT SERPL-MCNC: 0.7 MG/DL (ref 0.5–1.4)
DIFFERENTIAL METHOD: ABNORMAL
EOSINOPHIL # BLD AUTO: 0.1 K/UL (ref 0–0.5)
EOSINOPHIL NFR BLD: 1.9 % (ref 0–8)
ERYTHROCYTE [DISTWIDTH] IN BLOOD BY AUTOMATED COUNT: 14.1 % (ref 11.5–14.5)
EST. GFR  (NO RACE VARIABLE): >60 ML/MIN/1.73 M^2
ESTIMATED AVG GLUCOSE: 108 MG/DL (ref 68–131)
GLUCOSE SERPL-MCNC: 87 MG/DL (ref 70–110)
HBA1C MFR BLD: 5.4 % (ref 4–5.6)
HCT VFR BLD AUTO: 34.7 % (ref 37–48.5)
HDLC SERPL-MCNC: 55 MG/DL (ref 40–75)
HDLC SERPL: 28.9 % (ref 20–50)
HGB BLD-MCNC: 10.8 G/DL (ref 12–16)
IMM GRANULOCYTES # BLD AUTO: 0.02 K/UL (ref 0–0.04)
IMM GRANULOCYTES NFR BLD AUTO: 0.4 % (ref 0–0.5)
LDLC SERPL CALC-MCNC: 120.6 MG/DL (ref 63–159)
LYMPHOCYTES # BLD AUTO: 2 K/UL (ref 1–4.8)
LYMPHOCYTES NFR BLD: 38.2 % (ref 18–48)
MCH RBC QN AUTO: 28.3 PG (ref 27–31)
MCHC RBC AUTO-ENTMCNC: 31.1 G/DL (ref 32–36)
MCV RBC AUTO: 91 FL (ref 82–98)
MONOCYTES # BLD AUTO: 0.5 K/UL (ref 0.3–1)
MONOCYTES NFR BLD: 9.3 % (ref 4–15)
NEUTROPHILS # BLD AUTO: 2.5 K/UL (ref 1.8–7.7)
NEUTROPHILS NFR BLD: 48.8 % (ref 38–73)
NONHDLC SERPL-MCNC: 135 MG/DL
NRBC BLD-RTO: 0 /100 WBC
PLATELET # BLD AUTO: 384 K/UL (ref 150–450)
PMV BLD AUTO: 10.7 FL (ref 9.2–12.9)
POTASSIUM SERPL-SCNC: 4.1 MMOL/L (ref 3.5–5.1)
PROT SERPL-MCNC: 6.6 G/DL (ref 6–8.4)
RBC # BLD AUTO: 3.82 M/UL (ref 4–5.4)
SODIUM SERPL-SCNC: 140 MMOL/L (ref 136–145)
TRIGL SERPL-MCNC: 72 MG/DL (ref 30–150)
TSH SERPL DL<=0.005 MIU/L-ACNC: 0.96 UIU/ML (ref 0.4–4)
WBC # BLD AUTO: 5.18 K/UL (ref 3.9–12.7)

## 2023-01-12 PROCEDURE — 82306 VITAMIN D 25 HYDROXY: CPT | Mod: HCNC | Performed by: NURSE PRACTITIONER

## 2023-01-12 PROCEDURE — 80053 COMPREHEN METABOLIC PANEL: CPT | Mod: HCNC | Performed by: NURSE PRACTITIONER

## 2023-01-12 PROCEDURE — 85025 COMPLETE CBC W/AUTO DIFF WBC: CPT | Mod: HCNC | Performed by: NURSE PRACTITIONER

## 2023-01-12 PROCEDURE — 83036 HEMOGLOBIN GLYCOSYLATED A1C: CPT | Mod: HCNC | Performed by: NURSE PRACTITIONER

## 2023-01-12 PROCEDURE — 36415 COLL VENOUS BLD VENIPUNCTURE: CPT | Mod: HCNC,PO | Performed by: NURSE PRACTITIONER

## 2023-01-12 PROCEDURE — 84443 ASSAY THYROID STIM HORMONE: CPT | Mod: HCNC | Performed by: NURSE PRACTITIONER

## 2023-01-12 PROCEDURE — 80061 LIPID PANEL: CPT | Mod: HCNC | Performed by: NURSE PRACTITIONER

## 2023-01-19 ENCOUNTER — LAB VISIT (OUTPATIENT)
Dept: LAB | Facility: HOSPITAL | Age: 73
End: 2023-01-19
Attending: FAMILY MEDICINE
Payer: MEDICARE

## 2023-01-19 ENCOUNTER — OFFICE VISIT (OUTPATIENT)
Dept: INTERNAL MEDICINE | Facility: CLINIC | Age: 73
End: 2023-01-19
Payer: MEDICARE

## 2023-01-19 VITALS
TEMPERATURE: 98 F | OXYGEN SATURATION: 97 % | DIASTOLIC BLOOD PRESSURE: 60 MMHG | SYSTOLIC BLOOD PRESSURE: 110 MMHG | HEIGHT: 63 IN | BODY MASS INDEX: 20.54 KG/M2 | HEART RATE: 100 BPM | WEIGHT: 115.94 LBS

## 2023-01-19 DIAGNOSIS — Z00.00 ANNUAL PHYSICAL EXAM: Primary | ICD-10-CM

## 2023-01-19 DIAGNOSIS — D64.9 ANEMIA, UNSPECIFIED TYPE: ICD-10-CM

## 2023-01-19 DIAGNOSIS — D84.9 IMMUNOSUPPRESSED STATUS: ICD-10-CM

## 2023-01-19 DIAGNOSIS — M06.9 RHEUMATOID ARTHRITIS, INVOLVING UNSPECIFIED SITE, UNSPECIFIED WHETHER RHEUMATOID FACTOR PRESENT: Chronic | ICD-10-CM

## 2023-01-19 DIAGNOSIS — E53.8 B12 DEFICIENCY: ICD-10-CM

## 2023-01-19 DIAGNOSIS — F17.200 SMOKER: ICD-10-CM

## 2023-01-19 DIAGNOSIS — Z12.2 SCREENING FOR LUNG CANCER: ICD-10-CM

## 2023-01-19 DIAGNOSIS — M81.0 OSTEOPOROSIS, UNSPECIFIED OSTEOPOROSIS TYPE, UNSPECIFIED PATHOLOGICAL FRACTURE PRESENCE: Chronic | ICD-10-CM

## 2023-01-19 PROCEDURE — 96372 PR INJECTION,THERAP/PROPH/DIAG2ST, IM OR SUBCUT: ICD-10-PCS | Mod: HCNC,S$GLB,, | Performed by: FAMILY MEDICINE

## 2023-01-19 PROCEDURE — 3044F HG A1C LEVEL LT 7.0%: CPT | Mod: HCNC,CPTII,S$GLB, | Performed by: FAMILY MEDICINE

## 2023-01-19 PROCEDURE — 1159F MED LIST DOCD IN RCRD: CPT | Mod: HCNC,CPTII,S$GLB, | Performed by: FAMILY MEDICINE

## 2023-01-19 PROCEDURE — 99397 PR PREVENTIVE VISIT,EST,65 & OVER: ICD-10-PCS | Mod: HCNC,25,S$GLB, | Performed by: FAMILY MEDICINE

## 2023-01-19 PROCEDURE — 1126F PR PAIN SEVERITY QUANTIFIED, NO PAIN PRESENT: ICD-10-PCS | Mod: HCNC,CPTII,S$GLB, | Performed by: FAMILY MEDICINE

## 2023-01-19 PROCEDURE — 84466 ASSAY OF TRANSFERRIN: CPT | Mod: HCNC | Performed by: FAMILY MEDICINE

## 2023-01-19 PROCEDURE — 1160F PR REVIEW ALL MEDS BY PRESCRIBER/CLIN PHARMACIST DOCUMENTED: ICD-10-PCS | Mod: HCNC,CPTII,S$GLB, | Performed by: FAMILY MEDICINE

## 2023-01-19 PROCEDURE — 3044F PR MOST RECENT HEMOGLOBIN A1C LEVEL <7.0%: ICD-10-PCS | Mod: HCNC,CPTII,S$GLB, | Performed by: FAMILY MEDICINE

## 2023-01-19 PROCEDURE — 1160F RVW MEDS BY RX/DR IN RCRD: CPT | Mod: HCNC,CPTII,S$GLB, | Performed by: FAMILY MEDICINE

## 2023-01-19 PROCEDURE — 3074F PR MOST RECENT SYSTOLIC BLOOD PRESSURE < 130 MM HG: ICD-10-PCS | Mod: HCNC,CPTII,S$GLB, | Performed by: FAMILY MEDICINE

## 2023-01-19 PROCEDURE — 1101F PT FALLS ASSESS-DOCD LE1/YR: CPT | Mod: HCNC,CPTII,S$GLB, | Performed by: FAMILY MEDICINE

## 2023-01-19 PROCEDURE — 99999 PR PBB SHADOW E&M-EST. PATIENT-LVL IV: ICD-10-PCS | Mod: PBBFAC,HCNC,, | Performed by: FAMILY MEDICINE

## 2023-01-19 PROCEDURE — 99397 PER PM REEVAL EST PAT 65+ YR: CPT | Mod: HCNC,25,S$GLB, | Performed by: FAMILY MEDICINE

## 2023-01-19 PROCEDURE — 3074F SYST BP LT 130 MM HG: CPT | Mod: HCNC,CPTII,S$GLB, | Performed by: FAMILY MEDICINE

## 2023-01-19 PROCEDURE — 36415 COLL VENOUS BLD VENIPUNCTURE: CPT | Mod: HCNC,PO | Performed by: FAMILY MEDICINE

## 2023-01-19 PROCEDURE — 1159F PR MEDICATION LIST DOCUMENTED IN MEDICAL RECORD: ICD-10-PCS | Mod: HCNC,CPTII,S$GLB, | Performed by: FAMILY MEDICINE

## 2023-01-19 PROCEDURE — 3078F DIAST BP <80 MM HG: CPT | Mod: HCNC,CPTII,S$GLB, | Performed by: FAMILY MEDICINE

## 2023-01-19 PROCEDURE — 3078F PR MOST RECENT DIASTOLIC BLOOD PRESSURE < 80 MM HG: ICD-10-PCS | Mod: HCNC,CPTII,S$GLB, | Performed by: FAMILY MEDICINE

## 2023-01-19 PROCEDURE — 99999 PR PBB SHADOW E&M-EST. PATIENT-LVL IV: CPT | Mod: PBBFAC,HCNC,, | Performed by: FAMILY MEDICINE

## 2023-01-19 PROCEDURE — 3288F PR FALLS RISK ASSESSMENT DOCUMENTED: ICD-10-PCS | Mod: HCNC,CPTII,S$GLB, | Performed by: FAMILY MEDICINE

## 2023-01-19 PROCEDURE — 3008F PR BODY MASS INDEX (BMI) DOCUMENTED: ICD-10-PCS | Mod: HCNC,CPTII,S$GLB, | Performed by: FAMILY MEDICINE

## 2023-01-19 PROCEDURE — 96372 THER/PROPH/DIAG INJ SC/IM: CPT | Mod: HCNC,S$GLB,, | Performed by: FAMILY MEDICINE

## 2023-01-19 PROCEDURE — 3008F BODY MASS INDEX DOCD: CPT | Mod: HCNC,CPTII,S$GLB, | Performed by: FAMILY MEDICINE

## 2023-01-19 PROCEDURE — 1101F PR PT FALLS ASSESS DOC 0-1 FALLS W/OUT INJ PAST YR: ICD-10-PCS | Mod: HCNC,CPTII,S$GLB, | Performed by: FAMILY MEDICINE

## 2023-01-19 PROCEDURE — 82728 ASSAY OF FERRITIN: CPT | Mod: HCNC | Performed by: FAMILY MEDICINE

## 2023-01-19 PROCEDURE — 1126F AMNT PAIN NOTED NONE PRSNT: CPT | Mod: HCNC,CPTII,S$GLB, | Performed by: FAMILY MEDICINE

## 2023-01-19 PROCEDURE — 3288F FALL RISK ASSESSMENT DOCD: CPT | Mod: HCNC,CPTII,S$GLB, | Performed by: FAMILY MEDICINE

## 2023-01-19 RX ORDER — CYANOCOBALAMIN 1000 UG/ML
1000 INJECTION, SOLUTION INTRAMUSCULAR; SUBCUTANEOUS ONCE
Status: COMPLETED | OUTPATIENT
Start: 2023-01-19 | End: 2023-01-19

## 2023-01-19 RX ADMIN — CYANOCOBALAMIN 1000 MCG: 1000 INJECTION, SOLUTION INTRAMUSCULAR; SUBCUTANEOUS at 02:01

## 2023-01-19 NOTE — PROGRESS NOTES
"Subjective:      Patient ID: Emmy Padilla is a 72 y.o. female.    Chief Complaint: Annual Exam    HPI 72 y.o.   female patient with a PMHx of depression, RA, and osteoporosis presents to clinic for annual exam. The patient was last seen on November 3, 2021      Today she reports she tested positive for covid on 12/27/22. Patient reports one of the symptoms she experienced was diarrhea. She states she have been feeling fatigued lately. Patient reports pain in her hands. Patient otherwise without complaints. Denies SOB, chest pain, and bowel changes.     Seeing rheum/derm/plastics  No falls  Mood is good    Past Medical History:   Diagnosis Date    Depression     Dermatomyositis     resolved    History of detached retina repair 08/2016    right    Leos's neuroma 4/2014    bilateral - no surgery    Osteoporosis     BMD 2/9/07 spine -3.5, hip -2.8.    PMR (polymyalgia rheumatica)     resolved    Rheumatoid arthritis 12/2014    Dr. Traore     Family History   Problem Relation Age of Onset    Cancer Father         Esophageal    Cancer Mother     Cancer Brother 60        colon CA    Hypertension Brother     Hyperlipidemia Brother     Cancer Maternal Grandmother 92        breast CA     Past Surgical History:   Procedure Laterality Date    CONDYLOMA EXCISION/FULGURATION  2003    Anal    HAND SURGERY Right     middle finger trigger and swaying     nodules removed from rectal area      RETINAL DETACHMENT SURGERY Right 8/2106    RETINAL DETACHMENT SURGERY       Social History     Tobacco Use    Smoking status: Every Day     Packs/day: 1.00     Years: 40.00     Pack years: 40.00     Types: Cigarettes     Passive exposure: Current    Smokeless tobacco: Never   Substance Use Topics    Alcohol use: No     Alcohol/week: 0.0 standard drinks    Drug use: No       /60   Pulse 100   Temp 97.8 °F (36.6 °C)   Ht 5' 3" (1.6 m)   Wt 52.6 kg (115 lb 15.4 oz)   SpO2 97%   BMI 20.54 kg/m²     Review of Systems "   Constitutional:  Positive for fatigue. Negative for activity change, appetite change, chills, diaphoresis, fever and unexpected weight change.   HENT:  Negative for congestion, ear pain, hearing loss, postnasal drip, rhinorrhea, sinus pain and sore throat.    Eyes:  Negative for pain, discharge, itching and visual disturbance.   Respiratory:  Negative for cough, chest tightness, shortness of breath and wheezing.    Cardiovascular:  Negative for chest pain, palpitations and leg swelling.   Gastrointestinal:  Negative for abdominal pain, constipation, diarrhea, nausea and vomiting.   Endocrine: Negative for polydipsia and polyuria.   Genitourinary:  Negative for difficulty urinating, dysuria, flank pain, frequency, menstrual problem, pelvic pain and urgency.   Musculoskeletal:  Positive for arthralgias. Negative for back pain, joint swelling, myalgias and neck pain.   Skin:  Negative for color change and rash.   Neurological:  Negative for dizziness, weakness, light-headedness and headaches.   Hematological:  Negative for adenopathy.   Psychiatric/Behavioral:  Negative for confusion, decreased concentration and dysphoric mood.      Objective:     Physical Exam  Vitals and nursing note reviewed.   Constitutional:       General: She is not in acute distress.  HENT:      Right Ear: External ear normal.      Left Ear: External ear normal.      Nose: Nose normal.   Eyes:      Conjunctiva/sclera: Conjunctivae normal.      Pupils: Pupils are equal, round, and reactive to light.   Cardiovascular:      Rate and Rhythm: Normal rate and regular rhythm.      Heart sounds: Normal heart sounds.   Pulmonary:      Effort: Pulmonary effort is normal. No respiratory distress.      Breath sounds: Normal breath sounds. No wheezing or rales.   Abdominal:      General: Bowel sounds are normal. There is no distension.      Palpations: Abdomen is soft.      Tenderness: There is no abdominal tenderness. There is no guarding.    Musculoskeletal:         General: Deformity present.      Cervical back: Normal range of motion and neck supple.      Right lower leg: No edema.      Left lower leg: No edema.   Skin:     General: Skin is warm and dry.      Findings: No rash.   Neurological:      Mental Status: She is alert and oriented to person, place, and time.   Psychiatric:         Behavior: Behavior normal.         Thought Content: Thought content normal.         Judgment: Judgment normal.       Lab Results   Component Value Date    WBC 5.18 01/12/2023    HGB 10.8 (L) 01/12/2023    HCT 34.7 (L) 01/12/2023     01/12/2023    CHOL 190 01/12/2023    TRIG 72 01/12/2023    HDL 55 01/12/2023    ALT 15 01/12/2023    AST 19 01/12/2023     01/12/2023    K 4.1 01/12/2023     01/12/2023    CREATININE 0.7 01/12/2023    BUN 17 01/12/2023    CO2 23 01/12/2023    TSH 0.965 01/12/2023    HGBA1C 5.4 01/12/2023       Assessment:     1. Annual physical exam    2. Rheumatoid arthritis, involving unspecified site, unspecified whether rheumatoid factor present    3. Immunosuppressed status    4. Osteoporosis, unspecified osteoporosis type, unspecified pathological fracture presence    5. Anemia, unspecified type    6. Screening for lung cancer    7. Smoker    8. B12 deficiency         Plan:     Annual physical exam    Rheumatoid arthritis, involving unspecified site, unspecified whether rheumatoid factor present    Immunosuppressed status    Osteoporosis, unspecified osteoporosis type, unspecified pathological fracture presence    Anemia, unspecified type  -     Ferritin; Future; Expected date: 01/19/2023  -     Iron and TIBC; Future; Expected date: 01/19/2023    Screening for lung cancer  -     CT Chest Lung Screening Low Dose; Future; Expected date: 01/19/2023    Smoker  -     CT Chest Lung Screening Low Dose; Future; Expected date: 01/19/2023    B12 deficiency    Other orders  -     cyanocobalamin injection 1,000 mcg        Vitals reviewed.  BP within normal range at 110/60    Will check iron and ferritin levels  Patient is advised she is due for lung cancer screening due to smoking  history.  B12 injection and flu vaccine administered today.  Patient overall doing well.  Questions and concerns addressed.          Documentation entered by Low Gabriel, acting as scribe for Dr. Daniel Gregory. 01/19/2023 1:43 PM.

## 2023-01-20 LAB
FERRITIN SERPL-MCNC: 20 NG/ML (ref 20–300)
IRON SERPL-MCNC: 58 UG/DL (ref 30–160)
SATURATED IRON: 15 % (ref 20–50)
TOTAL IRON BINDING CAPACITY: 379 UG/DL (ref 250–450)
TRANSFERRIN SERPL-MCNC: 256 MG/DL (ref 200–375)

## 2023-02-07 DIAGNOSIS — Z00.00 ENCOUNTER FOR MEDICARE ANNUAL WELLNESS EXAM: ICD-10-CM

## 2023-02-09 DIAGNOSIS — Z00.00 ENCOUNTER FOR MEDICARE ANNUAL WELLNESS EXAM: ICD-10-CM

## 2023-02-16 RX ORDER — CLONAZEPAM 1 MG/1
1 TABLET ORAL NIGHTLY
Qty: 30 TABLET | Refills: 2 | Status: SHIPPED | OUTPATIENT
Start: 2023-02-16 | End: 2023-05-16

## 2023-02-16 NOTE — TELEPHONE ENCOUNTER
----- Message from Briana Gregory sent at 2/16/2023  7:40 AM CST -----  Contact: Emmy  Type:  RX Refill Request    Who Called:  Emmy  Refill or New Rx: refill   RX Name and Strength: Clonazepam (KLONOPIN) 1 MG tablet  How is the patient currently taking it? (ex. 1XDay): 1xday  Is this a 30 day or 90 day RX: 30  Preferred Pharmacy with phone number:   [id number]  [place] - [address]  [address] [address]  [place] [address]  Phone: [phone] Fax: [phone]  Local or Mail Order: Local  Ordering Provider: She is not sure   Would the patient rather a call back or a response via [facility]? Callback   Best Call Back Number: Please call her at [phone]  Additional Information:

## 2023-02-16 NOTE — TELEPHONE ENCOUNTER
No new care gaps identified.  [facility] Embedded Care Gaps. Reference number: [id number]. 2/16/2023   8:05:33 AM CST

## 2023-03-03 ENCOUNTER — TELEPHONE (OUTPATIENT)
Dept: INTERNAL MEDICINE | Facility: CLINIC | Age: 73
End: 2023-03-03
Payer: MEDICARE

## 2023-03-03 NOTE — TELEPHONE ENCOUNTER
----- Message from Mirian Lara sent at 3/3/2023  8:16 AM CST -----  Contact: self [phone]  Patient called in this morning stating when she picked up her prescription they gave her another brand clonazePAM (KLONOPIN) 1 MG tablet. She states she is not resting well. Please call back [phone]

## 2023-03-03 NOTE — TELEPHONE ENCOUNTER
Pt says, she has been taking Klonopin 1 mg for years for sleep. She hasn't had any problems until now. [facility] has changed . The  they are using now, the medicine doesn't work as well. She has been waking up during the night. She contacted the pharmacy to inquire about the medicine. She was advised to call her pcp to ask for an increase or call around to other pharmacies to see if they have the  she had been using. She had already checked the [facility] pharmacies in a 20 mile radius. She just wanted to know if what the pharmacy is telling her accurate and if changing the  does affect the way the medicine works. Explained to pt that it can make a difference when  is changed and the pharmacist is correct. Explained to pt that she will need to call different pharmacies to find out who has the . If she finds a pharmacy, she will have to wait until she is due for a refill to get a prescription sent to the new pharmacy. She verbalized understanding.

## 2023-04-07 ENCOUNTER — OFFICE VISIT (OUTPATIENT)
Dept: URGENT CARE | Facility: CLINIC | Age: 73
End: 2023-04-07
Payer: MEDICARE

## 2023-04-07 VITALS
RESPIRATION RATE: 18 BRPM | TEMPERATURE: 97 F | DIASTOLIC BLOOD PRESSURE: 59 MMHG | HEIGHT: 61 IN | OXYGEN SATURATION: 99 % | WEIGHT: 114 LBS | HEART RATE: 85 BPM | SYSTOLIC BLOOD PRESSURE: 114 MMHG | BODY MASS INDEX: 21.52 KG/M2
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
## 2023-04-07 PROCEDURE — 99213 OFFICE O/P EST LOW 20 MIN: CPT | Mod: S$GLB,,, | Performed by: NURSE PRACTITIONER

## 2023-04-07 PROCEDURE — 99213 PR OFFICE/OUTPT VISIT, EST, LEVL III, 20-29 MIN: ICD-10-PCS | Mod: S$GLB,,, | Performed by: NURSE PRACTITIONER

## 2023-04-07 RX ORDER — AMOXICILLIN AND CLAVULANATE POTASSIUM 400; 57 MG/5ML; MG/5ML
800 POWDER, FOR SUSPENSION ORAL EVERY 12 HOURS
Qty: 140 ML | Refills: 0 | Status: SHIPPED | OUTPATIENT
Start: 2023-04-07 | End: 2023-04-14

## 2023-04-07 RX ORDER — AMOXICILLIN AND CLAVULANATE POTASSIUM 875; 125 MG/1; MG/1
1 TABLET, FILM COATED ORAL 2 TIMES DAILY
Qty: 14 TABLET | Refills: 0 | Status: SHIPPED | OUTPATIENT
Start: 2023-04-07 | End: 2023-04-07

## 2023-04-07 NOTE — PROGRESS NOTES
"Subjective:      Patient ID: Emmy Padilla is a 72 y.o. female.    Vitals:  height is 5' 1" (1.549 m) and weight is 51.7 kg (114 lb). Her tympanic temperature is 97.3 °F (36.3 °C). Her blood pressure is 114/59 (abnormal) and her pulse is 85. Her respiration is 18 and oxygen saturation is 99%.     Chief Complaint: Abrasion        Patient is a 72-year-old female who presents to urgent care for evaluation right forearm wound. She was scratched by a dog about one week ago. The wound recently started to drain, patient is concern for possible infection. She denies fever, chills or body aches.     Other  This is a new problem. The current episode started in the past 7 days. The problem occurs constantly. The problem has been gradually worsening. Pertinent negatives include no abdominal pain, anorexia, arthralgias, change in bowel habit, chest pain, chills, congestion, coughing, diaphoresis, fatigue, fever, headaches, joint swelling, myalgias, nausea, neck pain, numbness, rash, sore throat, swollen glands, urinary symptoms, vertigo, visual change, vomiting or weakness. Exacerbated by: touching. Treatments tried: bactroban ointment, antibacterial soap. The treatment provided no relief.     Constitution: Negative for chills, sweating, fatigue and fever.   HENT:  Negative for congestion and sore throat.    Neck: Negative for neck pain.   Cardiovascular:  Negative for chest pain.   Respiratory:  Negative for cough.    Gastrointestinal:  Negative for abdominal pain, nausea and vomiting.   Musculoskeletal:  Negative for joint pain, joint swelling and muscle ache.   Skin:  Positive for abrasion (right forearm) and erythema. Negative for rash.   Neurological:  Negative for history of vertigo, headaches and numbness.    Objective:     Physical Exam   Constitutional: She is oriented to person, place, and time. She appears well-developed. She is cooperative.   HENT:   Head: Normocephalic and atraumatic.   Ears:   Right Ear: " Hearing, tympanic membrane, external ear and ear canal normal.   Left Ear: Hearing, tympanic membrane, external ear and ear canal normal.   Nose: Nose normal. No mucosal edema or nasal deformity. No epistaxis. Right sinus exhibits no maxillary sinus tenderness and no frontal sinus tenderness. Left sinus exhibits no maxillary sinus tenderness and no frontal sinus tenderness.   Mouth/Throat: Uvula is midline, oropharynx is clear and moist and mucous membranes are normal. No trismus in the jaw. Normal dentition. No uvula swelling.   Eyes: Conjunctivae and lids are normal.   Neck: Trachea normal and phonation normal. Neck supple.   Cardiovascular: Normal rate, regular rhythm, normal heart sounds and normal pulses.   Pulmonary/Chest: Effort normal and breath sounds normal.   Abdominal: Normal appearance and bowel sounds are normal. Soft.   Musculoskeletal: Normal range of motion.         General: Normal range of motion.   Neurological: She is alert and oriented to person, place, and time. She exhibits normal muscle tone.   Skin: Skin is warm, dry and intact. erythema         Comments: Abrasion injury from dog scratch    Psychiatric: Her speech is normal and behavior is normal. Judgment and thought content normal.   Nursing note and vitals reviewed.    Assessment:     1. Dog scratch        Plan:       Dog scratch  -     Discontinue: amoxicillin-clavulanate 875-125mg (AUGMENTIN) 875-125 mg per tablet; Take 1 tablet by mouth 2 (two) times daily.  Dispense: 14 tablet; Refill: 0  -     amoxicillin-clavulanate (AUGMENTIN) 400-57 mg/5 mL SusR; Take 10 mLs (800 mg total) by mouth every 12 (twelve) hours. for 7 days  Dispense: 140 mL; Refill: 0        Patient presents to urgent care for evaluation of right arm injury from a dog scratch.  The wound is not healing and now starting to drain. Will start Augmentin and response.

## 2023-04-10 ENCOUNTER — TELEPHONE (OUTPATIENT)
Dept: URGENT CARE | Facility: CLINIC | Age: 73
End: 2023-04-10
Payer: MEDICARE

## 2023-04-10 NOTE — TELEPHONE ENCOUNTER
Courtesy call made to patient to follow up after her visit with us.Patient states she is doing fine, her arm is healing, not looking any worse, no signs of infection.

## 2023-05-15 NOTE — TELEPHONE ENCOUNTER
No care due was identified.  [facility] Embedded Care Due Messages. Reference number: [id number].   5/14/2023 9:23:30 PM CDT

## 2023-05-16 RX ORDER — CLONAZEPAM 1 MG/1
TABLET ORAL
Qty: 30 TABLET | Refills: 2 | Status: SHIPPED | OUTPATIENT
Start: 2023-05-16 | End: 2023-08-11

## 2023-06-28 ENCOUNTER — TELEPHONE (OUTPATIENT)
Dept: INTERNAL MEDICINE | Facility: CLINIC | Age: 73
End: 2023-06-28
Payer: MEDICARE

## 2023-06-28 RX ORDER — CYANOCOBALAMIN 1000 UG/ML
1000 INJECTION, SOLUTION INTRAMUSCULAR; SUBCUTANEOUS
Status: ACTIVE | OUTPATIENT
Start: 2023-06-29 | End: 2023-12-26

## 2023-06-28 NOTE — TELEPHONE ENCOUNTER
----- Message from Mimi bAdi sent at 6/28/2023 10:33 AM CDT -----  Contact: Emmy  Type:  Sooner Apoointment Request    Caller is requesting a sooner appointment. Caller will not accept being placed on the waitlist and is requesting a message be sent to doctor.  Name of Caller:Emmy  When is the first available appointment?unknown  Symptoms:B-12 injection  Would the patient rather a call back or a response via [facility]? call  Best Call Back Number:[phone]  Additional Information: Patient request to schedule an appointment. Please give patient a call back to assist.   Thank you,  [facility]

## 2023-06-28 NOTE — TELEPHONE ENCOUNTER
Patient is requesting B12 Injection I did see in her chart you put B12 deficiency. Does patient need b12 monthly injection last ov 01/19/2023

## 2023-06-30 RX ORDER — DULOXETIN HYDROCHLORIDE 60 MG/1
CAPSULE, DELAYED RELEASE ORAL
Qty: 90 CAPSULE | Refills: 2 | Status: SHIPPED | OUTPATIENT
Start: 2023-06-30

## 2023-08-11 RX ORDER — CLONAZEPAM 1 MG/1
1 TABLET ORAL NIGHTLY
Qty: 30 TABLET | Refills: 2 | Status: SHIPPED | OUTPATIENT
Start: 2023-08-11 | End: 2023-11-13

## 2023-08-11 NOTE — TELEPHONE ENCOUNTER
No care due was identified.  [facility] Embedded Care Due Messages. Reference number: [id number].   8/10/2023 10:48:26 PM CDT

## 2023-10-27 ENCOUNTER — OFFICE VISIT (OUTPATIENT)
Dept: INTERNAL MEDICINE | Facility: CLINIC | Age: 73
End: 2023-10-27
Payer: MEDICARE

## 2023-10-27 VITALS
HEART RATE: 105 BPM | DIASTOLIC BLOOD PRESSURE: 54 MMHG | OXYGEN SATURATION: 96 % | BODY MASS INDEX: 20.52 KG/M2 | WEIGHT: 108.69 LBS | SYSTOLIC BLOOD PRESSURE: 116 MMHG | TEMPERATURE: 98 F | HEIGHT: 61 IN

## 2023-10-27 DIAGNOSIS — R05.1 ACUTE COUGH: ICD-10-CM

## 2023-10-27 DIAGNOSIS — J30.2 SEASONAL ALLERGIC RHINITIS, UNSPECIFIED TRIGGER: Primary | ICD-10-CM

## 2023-10-27 PROCEDURE — 1160F PR REVIEW ALL MEDS BY PRESCRIBER/CLIN PHARMACIST DOCUMENTED: ICD-10-PCS | Mod: CPTII,S$GLB,, | Performed by: NURSE PRACTITIONER

## 2023-10-27 PROCEDURE — 3288F PR FALLS RISK ASSESSMENT DOCUMENTED: ICD-10-PCS | Mod: CPTII,S$GLB,, | Performed by: NURSE PRACTITIONER

## 2023-10-27 PROCEDURE — 1159F MED LIST DOCD IN RCRD: CPT | Mod: CPTII,S$GLB,, | Performed by: NURSE PRACTITIONER

## 2023-10-27 PROCEDURE — 3044F PR MOST RECENT HEMOGLOBIN A1C LEVEL <7.0%: ICD-10-PCS | Mod: CPTII,S$GLB,, | Performed by: NURSE PRACTITIONER

## 2023-10-27 PROCEDURE — 1101F PT FALLS ASSESS-DOCD LE1/YR: CPT | Mod: CPTII,S$GLB,, | Performed by: NURSE PRACTITIONER

## 2023-10-27 PROCEDURE — 3074F SYST BP LT 130 MM HG: CPT | Mod: CPTII,S$GLB,, | Performed by: NURSE PRACTITIONER

## 2023-10-27 PROCEDURE — 99999 PR PBB SHADOW E&M-EST. PATIENT-LVL III: ICD-10-PCS | Mod: PBBFAC,,, | Performed by: NURSE PRACTITIONER

## 2023-10-27 PROCEDURE — 99999 PR PBB SHADOW E&M-EST. PATIENT-LVL III: CPT | Mod: PBBFAC,,, | Performed by: NURSE PRACTITIONER

## 2023-10-27 PROCEDURE — 3078F DIAST BP <80 MM HG: CPT | Mod: CPTII,S$GLB,, | Performed by: NURSE PRACTITIONER

## 2023-10-27 PROCEDURE — 1126F AMNT PAIN NOTED NONE PRSNT: CPT | Mod: CPTII,S$GLB,, | Performed by: NURSE PRACTITIONER

## 2023-10-27 PROCEDURE — 99214 PR OFFICE/OUTPT VISIT, EST, LEVL IV, 30-39 MIN: ICD-10-PCS | Mod: S$GLB,,, | Performed by: NURSE PRACTITIONER

## 2023-10-27 PROCEDURE — 1160F RVW MEDS BY RX/DR IN RCRD: CPT | Mod: CPTII,S$GLB,, | Performed by: NURSE PRACTITIONER

## 2023-10-27 PROCEDURE — 1101F PR PT FALLS ASSESS DOC 0-1 FALLS W/OUT INJ PAST YR: ICD-10-PCS | Mod: CPTII,S$GLB,, | Performed by: NURSE PRACTITIONER

## 2023-10-27 PROCEDURE — 3078F PR MOST RECENT DIASTOLIC BLOOD PRESSURE < 80 MM HG: ICD-10-PCS | Mod: CPTII,S$GLB,, | Performed by: NURSE PRACTITIONER

## 2023-10-27 PROCEDURE — 1126F PR PAIN SEVERITY QUANTIFIED, NO PAIN PRESENT: ICD-10-PCS | Mod: CPTII,S$GLB,, | Performed by: NURSE PRACTITIONER

## 2023-10-27 PROCEDURE — 3044F HG A1C LEVEL LT 7.0%: CPT | Mod: CPTII,S$GLB,, | Performed by: NURSE PRACTITIONER

## 2023-10-27 PROCEDURE — 1159F PR MEDICATION LIST DOCUMENTED IN MEDICAL RECORD: ICD-10-PCS | Mod: CPTII,S$GLB,, | Performed by: NURSE PRACTITIONER

## 2023-10-27 PROCEDURE — 3074F PR MOST RECENT SYSTOLIC BLOOD PRESSURE < 130 MM HG: ICD-10-PCS | Mod: CPTII,S$GLB,, | Performed by: NURSE PRACTITIONER

## 2023-10-27 PROCEDURE — 99214 OFFICE O/P EST MOD 30 MIN: CPT | Mod: S$GLB,,, | Performed by: NURSE PRACTITIONER

## 2023-10-27 PROCEDURE — 3288F FALL RISK ASSESSMENT DOCD: CPT | Mod: CPTII,S$GLB,, | Performed by: NURSE PRACTITIONER

## 2023-10-27 RX ORDER — PROMETHAZINE HYDROCHLORIDE AND DEXTROMETHORPHAN HYDROBROMIDE 6.25; 15 MG/5ML; MG/5ML
5 SYRUP ORAL EVERY 6 HOURS PRN
Qty: 150 ML | Refills: 0 | Status: SHIPPED | OUTPATIENT
Start: 2023-10-27 | End: 2023-12-13

## 2023-10-27 RX ORDER — FLUTICASONE PROPIONATE 50 MCG
2 SPRAY, SUSPENSION (ML) NASAL DAILY
Qty: 48 ML | Refills: 3 | Status: SHIPPED | OUTPATIENT
Start: 2023-10-27

## 2023-10-27 RX ORDER — MONTELUKAST SODIUM 10 MG/1
10 TABLET ORAL NIGHTLY
Qty: 90 TABLET | Refills: 1 | Status: SHIPPED | OUTPATIENT
Start: 2023-10-27

## 2023-10-27 NOTE — PROGRESS NOTES
"Subjective:       Patient ID: Emmy Padilla is a 73 y.o. female.    Chief Complaint: Sinus Problem    Patient here with Upper Respiratory Infection  x 3 days  Started promethazine dm syrup and flonase  Feels much better today  No fever or aches  Wants rx refill for cough med    Sinus Problem  Associated symptoms include congestion and coughing. Pertinent negatives include no chills, diaphoresis or shortness of breath.       BP (!) 116/54   Pulse 105   Temp 97.7 °F (36.5 °C)   Ht 5' 1" (1.549 m)   Wt 49.3 kg (108 lb 11 oz)   SpO2 96%   BMI 20.54 kg/m²     Review of Systems   Constitutional:  Negative for activity change, appetite change, chills, diaphoresis, fatigue, fever and unexpected weight change.   HENT:  Positive for congestion and postnasal drip.    Respiratory:  Positive for cough. Negative for shortness of breath.    Cardiovascular:  Negative for chest pain, palpitations and leg swelling.   Gastrointestinal: Negative.    Genitourinary: Negative.    Musculoskeletal: Negative.    Skin:  Negative for color change, pallor, rash and wound.   Allergic/Immunologic: Negative for immunocompromised state.   Neurological: Negative.  Negative for dizziness and facial asymmetry.   Hematological:  Negative for adenopathy. Does not bruise/bleed easily.   Psychiatric/Behavioral:  Negative for agitation, behavioral problems and confusion.        Objective:      Physical Exam  Vitals and nursing note reviewed.   Constitutional:       Appearance: She is well-developed. She is not diaphoretic.   HENT:      Head: Normocephalic and atraumatic.      Right Ear: Tympanic membrane, ear canal and external ear normal.      Left Ear: Tympanic membrane, ear canal and external ear normal.      Nose: Mucosal edema and rhinorrhea present.      Right Sinus: No maxillary sinus tenderness or frontal sinus tenderness.      Left Sinus: No maxillary sinus tenderness or frontal sinus tenderness.      Mouth/Throat:      Pharynx: " Uvula midline. No oropharyngeal exudate or posterior oropharyngeal erythema.   Eyes:      General:         Right eye: No discharge.         Left eye: No discharge.      Conjunctiva/sclera: Conjunctivae normal.   Cardiovascular:      Rate and Rhythm: Normal rate and regular rhythm.      Heart sounds: Normal heart sounds. No murmur heard.  Pulmonary:      Effort: Pulmonary effort is normal. No accessory muscle usage or respiratory distress.      Breath sounds: Normal breath sounds. No decreased breath sounds, wheezing, rhonchi or rales.   Chest:      Chest wall: No tenderness.   Abdominal:      General: There is no distension.      Palpations: Abdomen is soft.   Musculoskeletal:         General: Normal range of motion.      Cervical back: Normal range of motion.   Skin:     General: Skin is warm and dry.   Neurological:      Mental Status: She is alert and oriented to person, place, and time.   Psychiatric:         Behavior: Behavior normal.         Assessment:       1. Seasonal allergic rhinitis, unspecified trigger    2. Cough        Plan:       Emmy was seen today for sinus problem.    Diagnoses and all orders for this visit:    Seasonal allergic rhinitis, unspecified trigger  -     fluticasone propionate (FLONASE) 50 mcg/actuation nasal spray; 2 sprays (100 mcg total) by Each Nostril route once daily.  -     montelukast (SINGULAIR) 10 mg tablet; Take 1 tablet (10 mg total) by mouth every evening.    Cough  -     promethazine-dextromethorphan (PROMETHAZINE-DM) 6.25-15 mg/5 mL Syrp; Take 5 mLs by mouth every 6 (six) hours as needed (cough).      Chronic recurrent  Claritin and flonase in am  Singulair and rx syrup hs  Patient to message me Monday if not improving/worse

## 2023-11-12 NOTE — TELEPHONE ENCOUNTER
No care due was identified.  [facility] Embedded Care Due Messages. Reference number: [id number].   11/12/2023 1:21:20 AM CST

## 2023-11-13 RX ORDER — CLONAZEPAM 1 MG/1
1 TABLET ORAL NIGHTLY
Qty: 30 TABLET | Refills: 3 | Status: SHIPPED | OUTPATIENT
Start: 2023-11-13

## 2023-12-12 DIAGNOSIS — R05.1 ACUTE COUGH: ICD-10-CM

## 2023-12-13 RX ORDER — PROMETHAZINE HYDROCHLORIDE AND DEXTROMETHORPHAN HYDROBROMIDE 6.25; 15 MG/5ML; MG/5ML
5 SYRUP ORAL EVERY 6 HOURS PRN
Qty: 150 ML | Refills: 0 | Status: SHIPPED | OUTPATIENT
Start: 2023-12-13 | End: 2023-12-29

## 2023-12-29 DIAGNOSIS — R05.1 ACUTE COUGH: ICD-10-CM

## 2023-12-29 RX ORDER — PROMETHAZINE HYDROCHLORIDE AND DEXTROMETHORPHAN HYDROBROMIDE 6.25; 15 MG/5ML; MG/5ML
SYRUP ORAL
Qty: 150 ML | Refills: 0 | Status: SHIPPED | OUTPATIENT
Start: 2023-12-29 | End: 2024-02-02

## 2024-02-02 DIAGNOSIS — R05.1 ACUTE COUGH: ICD-10-CM

## 2024-02-02 RX ORDER — PROMETHAZINE HYDROCHLORIDE AND DEXTROMETHORPHAN HYDROBROMIDE 6.25; 15 MG/5ML; MG/5ML
SYRUP ORAL
Qty: 150 ML | Refills: 0 | Status: SHIPPED | OUTPATIENT
Start: 2024-02-02